# Patient Record
Sex: MALE | Race: WHITE | NOT HISPANIC OR LATINO | Employment: OTHER | URBAN - METROPOLITAN AREA
[De-identification: names, ages, dates, MRNs, and addresses within clinical notes are randomized per-mention and may not be internally consistent; named-entity substitution may affect disease eponyms.]

---

## 2017-01-30 ENCOUNTER — HOSPITAL ENCOUNTER (EMERGENCY)
Facility: HOSPITAL | Age: 42
Discharge: HOME | End: 2017-01-30
Attending: EMERGENCY MEDICINE | Admitting: EMERGENCY MEDICINE

## 2017-01-30 ENCOUNTER — APPOINTMENT (EMERGENCY)
Dept: RADIOLOGY | Facility: HOSPITAL | Age: 42
End: 2017-01-30

## 2017-01-30 VITALS
OXYGEN SATURATION: 95 % | HEIGHT: 66 IN | BODY MASS INDEX: 32.14 KG/M2 | RESPIRATION RATE: 18 BRPM | SYSTOLIC BLOOD PRESSURE: 153 MMHG | DIASTOLIC BLOOD PRESSURE: 93 MMHG | HEART RATE: 77 BPM | WEIGHT: 200 LBS | TEMPERATURE: 98.4 F

## 2017-01-30 DIAGNOSIS — S61.511A WRIST LACERATION, RIGHT, INITIAL ENCOUNTER: Primary | ICD-10-CM

## 2017-01-30 PROCEDURE — 90471 IMMUNIZATION ADMIN: CPT

## 2017-01-30 PROCEDURE — 90715 TDAP VACCINE 7 YRS/> IM: CPT | Performed by: EMERGENCY MEDICINE

## 2017-01-30 PROCEDURE — 99282 EMERGENCY DEPT VISIT SF MDM: CPT

## 2017-01-30 RX ORDER — LIDOCAINE HYDROCHLORIDE 10 MG/ML
10 INJECTION, SOLUTION EPIDURAL; INFILTRATION; INTRACAUDAL; PERINEURAL ONCE
Status: COMPLETED | OUTPATIENT
Start: 2017-01-30 | End: 2017-01-30

## 2017-01-30 RX ADMIN — TETANUS TOXOID, REDUCED DIPHTHERIA TOXOID AND ACELLULAR PERTUSSIS VACCINE, ADSORBED 0.5 ML: 5; 2.5; 8; 8; 2.5 SUSPENSION INTRAMUSCULAR at 20:07

## 2017-01-30 RX ADMIN — LIDOCAINE HYDROCHLORIDE 10 ML: 10 INJECTION, SOLUTION EPIDURAL; INFILTRATION; INTRACAUDAL; PERINEURAL at 20:24

## 2018-09-06 ENCOUNTER — APPOINTMENT (EMERGENCY)
Dept: CT IMAGING | Facility: HOSPITAL | Age: 43
End: 2018-09-06

## 2018-09-06 ENCOUNTER — HOSPITAL ENCOUNTER (EMERGENCY)
Facility: HOSPITAL | Age: 43
Discharge: HOME | End: 2018-09-06
Attending: EMERGENCY MEDICINE | Admitting: EMERGENCY MEDICINE

## 2018-09-06 VITALS
TEMPERATURE: 98.2 F | DIASTOLIC BLOOD PRESSURE: 82 MMHG | SYSTOLIC BLOOD PRESSURE: 146 MMHG | HEART RATE: 83 BPM | OXYGEN SATURATION: 97 % | RESPIRATION RATE: 16 BRPM

## 2018-09-06 DIAGNOSIS — K57.92 ACUTE DIVERTICULITIS: Primary | ICD-10-CM

## 2018-09-06 LAB
ALBUMIN SERPL BCP-MCNC: 3.7 G/DL (ref 3.5–5)
ALP SERPL-CCNC: 98 U/L (ref 46–116)
ALT SERPL W P-5'-P-CCNC: 43 U/L (ref 12–78)
ANION GAP SERPL CALCULATED.3IONS-SCNC: 9 MMOL/L (ref 4–13)
AST SERPL W P-5'-P-CCNC: 21 U/L (ref 5–45)
BASOPHILS # BLD AUTO: 0.08 THOUSANDS/ΜL (ref 0–0.1)
BASOPHILS NFR BLD AUTO: 1 % (ref 0–1)
BILIRUB SERPL-MCNC: 0.35 MG/DL (ref 0.2–1)
BILIRUB UR QL STRIP: NEGATIVE
BUN SERPL-MCNC: 11 MG/DL (ref 5–25)
CALCIUM SERPL-MCNC: 9 MG/DL (ref 8.3–10.1)
CHLORIDE SERPL-SCNC: 103 MMOL/L (ref 100–108)
CLARITY UR: CLEAR
CO2 SERPL-SCNC: 26 MMOL/L (ref 21–32)
COLOR UR: YELLOW
COLOR, POC: YELLOW
CREAT SERPL-MCNC: 1.03 MG/DL (ref 0.6–1.3)
EOSINOPHIL # BLD AUTO: 0.22 THOUSAND/ΜL (ref 0–0.61)
EOSINOPHIL NFR BLD AUTO: 2 % (ref 0–6)
ERYTHROCYTE [DISTWIDTH] IN BLOOD BY AUTOMATED COUNT: 13.5 % (ref 11.6–15.1)
GFR SERPL CREATININE-BSD FRML MDRD: 90 ML/MIN/1.73SQ M
GLUCOSE SERPL-MCNC: 86 MG/DL (ref 65–140)
GLUCOSE UR STRIP-MCNC: NEGATIVE MG/DL
HCT VFR BLD AUTO: 45.1 % (ref 36.5–49.3)
HGB BLD-MCNC: 15.3 G/DL (ref 12–17)
HGB UR QL STRIP.AUTO: NEGATIVE
IMM GRANULOCYTES # BLD AUTO: 0.03 THOUSAND/UL (ref 0–0.2)
IMM GRANULOCYTES NFR BLD AUTO: 0 % (ref 0–2)
KETONES UR STRIP-MCNC: NEGATIVE MG/DL
LEUKOCYTE ESTERASE UR QL STRIP: NEGATIVE
LYMPHOCYTES # BLD AUTO: 2.73 THOUSANDS/ΜL (ref 0.6–4.47)
LYMPHOCYTES NFR BLD AUTO: 26 % (ref 14–44)
MCH RBC QN AUTO: 30.4 PG (ref 26.8–34.3)
MCHC RBC AUTO-ENTMCNC: 33.9 G/DL (ref 31.4–37.4)
MCV RBC AUTO: 90 FL (ref 82–98)
MONOCYTES # BLD AUTO: 0.72 THOUSAND/ΜL (ref 0.17–1.22)
MONOCYTES NFR BLD AUTO: 7 % (ref 4–12)
NEUTROPHILS # BLD AUTO: 6.88 THOUSANDS/ΜL (ref 1.85–7.62)
NEUTS SEG NFR BLD AUTO: 64 % (ref 43–75)
NITRITE UR QL STRIP: NEGATIVE
NRBC BLD AUTO-RTO: 0 /100 WBCS
PH UR STRIP.AUTO: 7.5 [PH] (ref 4.5–8)
PLATELET # BLD AUTO: 294 THOUSANDS/UL (ref 149–390)
PMV BLD AUTO: 9.6 FL (ref 8.9–12.7)
POTASSIUM SERPL-SCNC: 3.8 MMOL/L (ref 3.5–5.3)
PROT SERPL-MCNC: 7.8 G/DL (ref 6.4–8.2)
PROT UR STRIP-MCNC: NEGATIVE MG/DL
RBC # BLD AUTO: 5.03 MILLION/UL (ref 3.88–5.62)
SODIUM SERPL-SCNC: 138 MMOL/L (ref 136–145)
SP GR UR STRIP.AUTO: 1.01 (ref 1–1.03)
UROBILINOGEN UR QL STRIP.AUTO: 0.2 E.U./DL
WBC # BLD AUTO: 10.66 THOUSAND/UL (ref 4.31–10.16)

## 2018-09-06 PROCEDURE — 74177 CT ABD & PELVIS W/CONTRAST: CPT

## 2018-09-06 PROCEDURE — 36415 COLL VENOUS BLD VENIPUNCTURE: CPT | Performed by: EMERGENCY MEDICINE

## 2018-09-06 PROCEDURE — 80053 COMPREHEN METABOLIC PANEL: CPT | Performed by: EMERGENCY MEDICINE

## 2018-09-06 PROCEDURE — 85025 COMPLETE CBC W/AUTO DIFF WBC: CPT | Performed by: EMERGENCY MEDICINE

## 2018-09-06 PROCEDURE — 81003 URINALYSIS AUTO W/O SCOPE: CPT

## 2018-09-06 PROCEDURE — 99284 EMERGENCY DEPT VISIT MOD MDM: CPT

## 2018-09-06 RX ORDER — CIPROFLOXACIN 500 MG/1
500 TABLET, FILM COATED ORAL ONCE
Status: COMPLETED | OUTPATIENT
Start: 2018-09-06 | End: 2018-09-06

## 2018-09-06 RX ORDER — CIPROFLOXACIN 500 MG/1
500 TABLET, FILM COATED ORAL EVERY 12 HOURS
Qty: 20 TABLET | Refills: 0 | Status: SHIPPED | OUTPATIENT
Start: 2018-09-06 | End: 2018-09-16

## 2018-09-06 RX ORDER — METRONIDAZOLE 500 MG/1
500 TABLET ORAL ONCE
Status: COMPLETED | OUTPATIENT
Start: 2018-09-06 | End: 2018-09-06

## 2018-09-06 RX ORDER — METRONIDAZOLE 500 MG/1
500 TABLET ORAL EVERY 8 HOURS SCHEDULED
Qty: 30 TABLET | Refills: 0 | Status: SHIPPED | OUTPATIENT
Start: 2018-09-06 | End: 2018-09-16

## 2018-09-06 RX ADMIN — CIPROFLOXACIN HYDROCHLORIDE 500 MG: 500 TABLET, FILM COATED ORAL at 14:29

## 2018-09-06 RX ADMIN — SODIUM CHLORIDE 1000 ML: 0.9 INJECTION, SOLUTION INTRAVENOUS at 12:48

## 2018-09-06 RX ADMIN — METRONIDAZOLE 500 MG: 500 TABLET ORAL at 14:29

## 2018-09-06 RX ADMIN — IOHEXOL 100 ML: 350 INJECTION, SOLUTION INTRAVENOUS at 13:58

## 2018-09-06 NOTE — DISCHARGE INSTRUCTIONS
You had labs drawn which were all normal  The labs were complete blood count and comprehensive metabolic panel  Diverticulitis   WHAT YOU NEED TO KNOW:   Diverticulitis is a condition that causes small pockets along your intestine called diverticula to become inflamed or infected  This is caused by hard bowel movements, food, or bacteria that get stuck in the pockets  DISCHARGE INSTRUCTIONS:   Return to the emergency department if:   · You have bowel movement or foul-smelling discharge leaking from your vagina or in your urine  · You have severe diarrhea  · You urinate less than usual or not at all  · You are not able to have a bowel movement  · You cannot stop vomiting  · You have severe abdominal pain, a fever, and your abdomen is larger than usual      · You have new or increased blood in your bowel movements  Contact your healthcare provider if:   · You have pain when you urinate  · Your symptoms get worse or do not go away  · You have questions or concerns about your condition or care  Medicines:   · Antibiotics  may be given to help treat a bacterial infection  · Prescription pain medicine  may be given  Ask your healthcare provider how to take this medicine safely  Some prescription pain medicines contain acetaminophen  Do not take other medicines that contain acetaminophen without talking to your healthcare provider  Too much acetaminophen may cause liver damage  Prescription pain medicine may cause constipation  Ask your healthcare provider how to prevent or treat constipation  · Take your medicine as directed  Contact your healthcare provider if you think your medicine is not helping or if you have side effects  Tell him or her if you are allergic to any medicine  Keep a list of the medicines, vitamins, and herbs you take  Include the amounts, and when and why you take them  Bring the list or the pill bottles to follow-up visits   Carry your medicine list with you in case of an emergency  Clear liquid diet:  A clear liquid diet includes any liquids that you can see through  Examples include water, ginger-itz, cranberry or apple juice, frozen fruit ice, or broth  Stay on a clear liquid diet until your symptoms are gone, or as directed  Follow up with your healthcare provider as directed: You may need to return for a colonoscopy  When your symptoms are gone, you may need a low-fat, high-fiber diet to prevent diverticulitis from developing again  Your healthcare provider or dietitian can help you create meal plans  Write down your questions so you remember to ask them during your visits  © 2017 [employer]  Information is for End User's use only and may not be sold, redistributed or otherwise used for commercial purposes  All illustrations and images included in CareNotes® are the copyrighted property of [employer]  or [employer]  The above information is an  only  It is not intended as medical advice for individual conditions or treatments  Talk to your doctor, nurse or pharmacist before following any medical regimen to see if it is safe and effective for you  Diverticulitis Diet   WHAT YOU NEED TO KNOW:   A diverticulitis diet includes foods that allow your intestines to rest while you have diverticulitis  Diverticulitis is a condition that causes small pockets along your intestine called diverticula to become inflamed or infected  This is caused by hard bowel movement, food, or bacteria that get stuck in the pockets  DISCHARGE INSTRUCTIONS:   Foods you can eat while you have diverticulitis:   · A clear liquid diet may be recommended for 2 to 3 days  A clear liquid diet is made up of clear liquids and foods that are liquid at room temperature  Your healthcare provider will tell you when you can start eating solid foods   Examples of clear liquids include the following:     ¨ Water and clear juices (such as apple, cranberry, or grape), strained citrus juices or fruit punch    ¨ Coffee or tea (without cream or milk)    ¨ Clear sports drinks or soft drinks, such as ginger ale, lemon-lime soda, or club soda (no cola or root beer)    ¨ Clear broth, bouillon, or consommé    ¨ Plain popsicles (no popsicles with pureed fruit or fiber)    ¨ Flavored gelatin without fruit    · A low-fiber diet may be recommended until your symptoms improve  Your healthcare provider will tell you when you can slowly add high-fiber foods back into your diet  ¨ Cream of wheat and finely ground grits    ¨ White bread, white pasta, and white rice    ¨ Canned and well-cooked fruit without skins or seeds, and juice without pulp    ¨ Canned and well-cooked vegetables without skins or seeds, and vegetable juice    ¨ Cow's milk, lactose-free milk, soy milk, and rice milk    ¨ Yogurt, cottage cheese, and sherbet    ¨ Eggs, poultry (such as chicken and turkey), fish, and tender, ground, well-cooked beef     ¨ Tofu and smooth nut butters, such as peanut butter    ¨ Broth and strained soups made of low-fiber foods  Foods you should avoid while you have diverticulitis:  Avoid foods that are high in fiber while you have symptoms of diverticulitis  Examples of high-fiber foods include the following:  · Whole grains and breads, and cereals made with whole grains    · Dried fruit, fresh fruit with skin, and fruit pulp    · Raw vegetables    · Cooked greens, such as spinach    · Tough meat and meat with gristle    · Legumes, such as higgins beans and lentils  Contact your healthcare provider if:   · Your symptoms do not get better, or they get worse  · You have questions about the foods you should eat  · You have questions or concerns about your condition or care  © 2017 [employer] Information is for End User's use only and may not be sold, redistributed or otherwise used for commercial purposes   All illustrations and images included in CareNotes® are the copyrighted property of [employer] M [employer]  or [employer]  The above information is an  only  It is not intended as medical advice for individual conditions or treatments  Talk to your doctor, nurse or pharmacist before following any medical regimen to see if it is safe and effective for you

## 2018-09-06 NOTE — ED PROVIDER NOTES
History  Chief Complaint   Patient presents with    Abdominal Pain     LLQ pain since yesterday, states pain is constant  57-year-old male left lower quadrant pain aching  Three days ago started  No diarrhea constipation perhaps some loose stools in the past week  No fevers or chills no dysuria hematuria  Exam reveals left lower quadrant pain    Assessment plan left lower outer quadrant pain likely acute diverticulitis no history so will confirm a CT check labs urine DC possible depending on labs, ct  Abdominal Pain   Pain location:  LLQ  Pain quality: aching, cramping and gnawing    Pain radiates to:  Does not radiate  Pain severity:  Moderate  Onset quality:  Gradual  Timing:  Intermittent  Progression:  Waxing and waning  Chronicity:  New  Relieved by:  Nothing  Worsened by:  Nothing  Ineffective treatments:  None tried  Associated symptoms: no chest pain, no chills, no cough, no diarrhea, no dysuria, no fatigue, no fever, no nausea, no shortness of breath and no vomiting        None       History reviewed  No pertinent past medical history  History reviewed  No pertinent surgical history  History reviewed  No pertinent family history  I have reviewed and agree with the history as documented  Social History   Substance Use Topics    Smoking status: Heavy Tobacco Smoker     Packs/day: 2 00     Types: Cigarettes    Smokeless tobacco: Current User     Types: Chew    Alcohol use Yes      Comment: occas        Review of Systems   Constitutional: Negative for chills, fatigue and fever  Eyes: Negative for photophobia and visual disturbance  Respiratory: Negative for cough and shortness of breath  Cardiovascular: Negative for chest pain, palpitations and leg swelling  Gastrointestinal: Positive for abdominal pain  Negative for diarrhea, nausea and vomiting  Endocrine: Negative for polydipsia and polyuria     Genitourinary: Negative for decreased urine volume, difficulty urinating, dysuria and frequency  Musculoskeletal: Negative for back pain, neck pain and neck stiffness  Skin: Negative for color change and rash  Allergic/Immunologic: Negative for environmental allergies and immunocompromised state  Neurological: Negative for dizziness and headaches  Hematological: Negative for adenopathy  Does not bruise/bleed easily  Psychiatric/Behavioral: Negative for dysphoric mood  The patient is not nervous/anxious  Physical Exam  Physical Exam   Constitutional: He is oriented to person, place, and time  He appears well-developed  HENT:   Head: Normocephalic and atraumatic  Right Ear: External ear normal    Left Ear: External ear normal    Mouth/Throat: Oropharynx is clear and moist    Eyes: Conjunctivae and EOM are normal  Pupils are equal, round, and reactive to light  Neck: Normal range of motion  Neck supple  No JVD present  No thyromegaly present  Cardiovascular: Normal rate, regular rhythm and normal heart sounds  Exam reveals no gallop and no friction rub  No murmur heard  Pulmonary/Chest: Effort normal and breath sounds normal  No respiratory distress  He has no wheezes  He has no rales  Abdominal: Soft  Bowel sounds are normal  He exhibits no distension  There is no rebound and no guarding  Musculoskeletal: Normal range of motion  He exhibits no edema  Lymphadenopathy:     He has no cervical adenopathy  Neurological: He is alert and oriented to person, place, and time  No cranial nerve deficit  Skin: Skin is warm  Psychiatric: He has a normal mood and affect  His behavior is normal    Nursing note and vitals reviewed        Vital Signs  ED Triage Vitals [09/06/18 1220]   Temperature Pulse Respirations Blood Pressure SpO2   98 2 °F (36 8 °C) 87 18 160/94 96 %      Temp Source Heart Rate Source Patient Position - Orthostatic VS BP Location FiO2 (%)   Temporal Monitor Sitting Right arm --      Pain Score       7           Vitals:    09/06/18 1220 09/06/18 1431   BP: 160/94 146/82   Pulse: 87 83   Patient Position - Orthostatic VS: Sitting Sitting       Visual Acuity      ED Medications  Medications   sodium chloride 0 9 % bolus 1,000 mL (0 mL Intravenous Stopped 9/6/18 1429)   iohexol (OMNIPAQUE) 350 MG/ML injection (MULTI-DOSE) 100 mL (100 mL Intravenous Given 9/6/18 1358)   ciprofloxacin (CIPRO) tablet 500 mg (500 mg Oral Given 9/6/18 1429)   metroNIDAZOLE (FLAGYL) tablet 500 mg (500 mg Oral Given 9/6/18 1429)       Diagnostic Studies  Results Reviewed     Procedure Component Value Units Date/Time    POCT urinalysis dipstick [[id number]]  (Normal) Resulted:  09/06/18 1255    Lab Status:  Final result Specimen:  Urine Updated:  09/06/18 1333     Color, UA yellow    Comprehensive metabolic panel [[id number]] Collected:  09/06/18 1248    Lab Status:  Final result Specimen:  Blood from Arm, Right Updated:  09/06/18 1314     Sodium 138 mmol/L      Potassium 3 8 mmol/L      Chloride 103 mmol/L      CO2 26 mmol/L      ANION GAP 9 mmol/L      BUN 11 mg/dL      Creatinine 1 03 mg/dL      Glucose 86 mg/dL      Calcium 9 0 mg/dL      AST 21 U/L      ALT 43 U/L      Alkaline Phosphatase 98 U/L      Total Protein 7 8 g/dL      Albumin 3 7 g/dL      Total Bilirubin 0 35 mg/dL      eGFR 90 ml/min/1 73sq m     Narrative:         National Kidney Disease Education Program recommendations are as follows:  GFR calculation is accurate only with a steady state creatinine  Chronic Kidney disease less than 60 ml/min/1 73 sq  meters  Kidney failure less than 15 ml/min/1 73 sq  meters      CBC and differential [[id number]]  (Abnormal) Collected:  09/06/18 1248    Lab Status:  Final result Specimen:  Blood from Arm, Right Updated:  09/06/18 1257     WBC 10 66 (H) Thousand/uL      RBC 5 03 Million/uL      Hemoglobin 15 3 g/dL      Hematocrit 45 1 %      MCV 90 fL      MCH 30 4 pg      MCHC 33 9 g/dL      RDW 13 5 %      MPV 9 6 fL      Platelets 670 Thousands/uL      nRBC 0 /100 WBCs Neutrophils Relative 64 %      Immat GRANS % 0 %      Lymphocytes Relative 26 %      Monocytes Relative 7 %      Eosinophils Relative 2 %      Basophils Relative 1 %      Neutrophils Absolute 6 88 Thousands/µL      Immature Grans Absolute 0 03 Thousand/uL      Lymphocytes Absolute 2 73 Thousands/µL      Monocytes Absolute 0 72 Thousand/µL      Eosinophils Absolute 0 22 Thousand/µL      Basophils Absolute 0 08 Thousands/µL     ED Urine Macroscopic [[id number]] Collected:  09/06/18 1303    Lab Status:  Final result Specimen:  Urine Updated:  09/06/18 1254     Color, UA Yellow     Clarity, UA Clear     pH, UA 7 5     Leukocytes, UA Negative     Nitrite, UA Negative     Protein, UA Negative mg/dl      Glucose, UA Negative mg/dl      Ketones, UA Negative mg/dl      Urobilinogen, UA 0 2 E U /dl      Bilirubin, UA Negative     Blood, UA Negative     Specific Gravity, UA 1 015    Narrative:       CL[facility] RESULT                 CT abdomen pelvis w contrast   Final Result by Joan Carney MD (09/06 1403)      Mild acute descending colonic diverticulitis  No free air or pericolonic abscess  The study was marked in EPIC for significant notification              Workstation performed: [id number]                    Procedures  Procedures       Phone Contacts  ED Phone Contact    ED Course                               MDM  Number of Diagnoses or Management Options  Acute diverticulitis: new and requires workup  Diagnosis management comments: Patient with mild acute descending colon diverticulitis will be treated with antibiotics given follow-up with Colorectal and also the patient has a family doctor now that he will follow up with       Amount and/or Complexity of Data Reviewed  Clinical lab tests: reviewed and ordered  Tests in the radiology section of CPT®: reviewed and ordered  Tests in the medicine section of CPT®: reviewed and ordered  Independent visualization of images, tracings, or specimens: yes    Patient Progress  Patient progress: stable    CritCare Time    Disposition  Final diagnoses:   Acute diverticulitis     Time reflects when diagnosis was documented in both MDM as applicable and the Disposition within this note     Time User Action Codes Description Comment    9/6/2018  2:19 PM Alexis Antoni Add [K57 92] Acute diverticulitis       ED Disposition     ED Disposition Condition Comment    Discharge  Angel Raman discharge to home/self care  Condition at discharge: Good        Follow-up Information     Follow up With Specialties Details Why Contact Info Additional [address] [facility] Emergency Department Emergency Medicine  If symptoms worsen [address] [facility]  [phone] [place] [facility], [address] , Viral Espinoza [address], MD Colon and Rectal Surgery   [address] [place] [address]  [phone]             Discharge Medication List as of 9/6/2018  2:24 PM      START taking these medications    Details   ciprofloxacin (CIPRO) 500 mg tablet Take 1 tablet (500 mg total) by mouth every 12 (twelve) hours for 10 days, Starting u 9/6/2018, Until Sun 9/16/2018, Print      metroNIDAZOLE (FLAGYL) 500 mg tablet Take 1 tablet (500 mg total) by mouth every 8 (eight) hours for 10 days, Starting Thu 9/6/2018, Until Sun 9/16/2018, Print           No discharge procedures on file      ED Provider  Electronically Signed by           Valentino Do DO  09/11/18 9305

## 2018-12-05 ENCOUNTER — OFFICE VISIT (OUTPATIENT)
Dept: FAMILY MEDICINE CLINIC | Facility: CLINIC | Age: 43
End: 2018-12-05
Payer: COMMERCIAL

## 2018-12-05 VITALS
WEIGHT: 213.6 LBS | RESPIRATION RATE: 18 BRPM | HEART RATE: 68 BPM | BODY MASS INDEX: 34.33 KG/M2 | DIASTOLIC BLOOD PRESSURE: 76 MMHG | SYSTOLIC BLOOD PRESSURE: 124 MMHG | HEIGHT: 66 IN | OXYGEN SATURATION: 96 % | TEMPERATURE: 97.9 F

## 2018-12-05 DIAGNOSIS — M77.12 LATERAL EPICONDYLITIS OF BOTH ELBOWS: ICD-10-CM

## 2018-12-05 DIAGNOSIS — K57.92 DIVERTICULITIS: ICD-10-CM

## 2018-12-05 DIAGNOSIS — Z13.6 ENCOUNTER FOR LIPID SCREENING FOR CARDIOVASCULAR DISEASE: ICD-10-CM

## 2018-12-05 DIAGNOSIS — L40.9 PSORIASIS: ICD-10-CM

## 2018-12-05 DIAGNOSIS — R73.03 PREDIABETES: ICD-10-CM

## 2018-12-05 DIAGNOSIS — Z13.220 ENCOUNTER FOR LIPID SCREENING FOR CARDIOVASCULAR DISEASE: ICD-10-CM

## 2018-12-05 DIAGNOSIS — M77.11 LATERAL EPICONDYLITIS OF BOTH ELBOWS: ICD-10-CM

## 2018-12-05 DIAGNOSIS — F17.200 SMOKER: ICD-10-CM

## 2018-12-05 DIAGNOSIS — I10 ESSENTIAL HYPERTENSION: Primary | ICD-10-CM

## 2018-12-05 PROCEDURE — 3078F DIAST BP <80 MM HG: CPT | Performed by: FAMILY MEDICINE

## 2018-12-05 PROCEDURE — 3008F BODY MASS INDEX DOCD: CPT | Performed by: FAMILY MEDICINE

## 2018-12-05 PROCEDURE — 3725F SCREEN DEPRESSION PERFORMED: CPT | Performed by: FAMILY MEDICINE

## 2018-12-05 PROCEDURE — 99204 OFFICE O/P NEW MOD 45 MIN: CPT | Performed by: FAMILY MEDICINE

## 2018-12-05 PROCEDURE — 3074F SYST BP LT 130 MM HG: CPT | Performed by: FAMILY MEDICINE

## 2018-12-05 RX ORDER — LISINOPRIL 20 MG/1
20 TABLET ORAL DAILY
COMMUNITY
End: 2018-12-05 | Stop reason: SDUPTHER

## 2018-12-05 RX ORDER — MULTIVITAMIN
1 CAPSULE ORAL DAILY
COMMUNITY
End: 2020-12-16 | Stop reason: ALTCHOICE

## 2018-12-05 RX ORDER — MULTIVIT-MIN/IRON/FOLIC ACID/K 18-600-40
1 CAPSULE ORAL DAILY
COMMUNITY

## 2018-12-05 RX ORDER — LISINOPRIL 20 MG/1
20 TABLET ORAL DAILY
Qty: 30 TABLET | Refills: 0 | Status: SHIPPED | OUTPATIENT
Start: 2018-12-05 | End: 2018-12-26

## 2018-12-05 NOTE — PROGRESS NOTES
Subjective:      Patient ID: Elda Mccloud is a 41 y o  male  Patient is here to establish care  Patient states that she recently got insurance  He is to follow up with the primary care provider in [facility] but is unable to go there for his appointments anymore  Patient states that he was diagnosed to have hypertension and started on lisinopril  Patient states that he has been very careful with his diet and trying to lose weight  His blood pressure today is at goal   Patient underwent labs which revealed a state of pre diabetes with an A1c of 6 4, borderline cholesterol  Patient states that he is due for labs now  Also diagnosed with mild diverticulitis in September 2018  Patient was started on oral antibiotics and advised to follow up with the colorectal surgeon  Patient is reporting mild discomfort in his lower abdomen the denies any symptoms of fever nausea or vomiting  Patient states that his original symptoms have improved but he gets intermittent  sharp pain in his lower belly  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  He was diagnosed with lateral epicondylitis many years ago and treated with local steroid shot  Patient states that his symptoms improved significantly with the steroid injection  Hypertension   This is a chronic problem  The current episode started more than 1 year ago  The problem has been gradually improving since onset  The problem is controlled  Pertinent negatives include no blurred vision, chest pain, headaches, orthopnea, palpitations, peripheral edema or shortness of breath  Risk factors for coronary artery disease include dyslipidemia, male gender, obesity, diabetes mellitus, smoking/tobacco exposure and stress  Past treatments include lifestyle changes (LISINOPRIL 20 MG )  The current treatment provides significant improvement  There are no compliance problems  Abdominal Pain   This is a chronic problem  The current episode started more than 1 month ago  The onset quality is gradual  The problem occurs intermittently  The pain is located in the LLQ  The pain is at a severity of 4/10  The pain is mild  The quality of the pain is aching  The abdominal pain does not radiate  Pertinent negatives include no constipation, diarrhea, dysuria, fever, headaches, hematuria, melena, myalgias, nausea or vomiting  Nothing aggravates the pain  The pain is relieved by nothing  He has tried nothing for the symptoms  Prior diagnostic workup includes CT scan  Diagnosed with mild diverticulitis in September 2018  Arm Pain    Incident onset: Chronic  The incident occurred at home  There was no injury mechanism  The pain is present in the left elbow and right elbow  The quality of the pain is described as aching  The pain does not radiate  The pain is at a severity of 6/10  The pain is moderate  The pain has been fluctuating since the incident  Pertinent negatives include no chest pain  The symptoms are aggravated by lifting and movement  He has tried nothing for the symptoms  The treatment provided no relief  Past Medical History:   Diagnosis Date    Diverticulitis     Hypertension     Prediabetes        Family History   Problem Relation Age of Onset    Diabetes Maternal Grandmother     Diabetes Paternal Grandfather        History reviewed  No pertinent surgical history  reports that he has been smoking Cigarettes  He has a 50 00 pack-year smoking history  His smokeless tobacco use includes Chew  He reports that he drinks alcohol  He reports that he does not use drugs        Current Outpatient Prescriptions:     Cholecalciferol (VITAMIN D) 2000 units CAPS, Take 1 capsule by mouth daily, Disp: , Rfl:     lisinopril (ZESTRIL) 20 mg tablet, Take 1 tablet (20 mg total) by mouth daily, Disp: 30 tablet, Rfl: 0    Multiple Vitamin (MULTIVITAMIN) capsule, Take 1 capsule by mouth daily, Disp: , Rfl:     Ustekinumab (STELARA SC), Inject under the skin, Disp: , Rfl:     The following portions of the patient's history were reviewed and updated as appropriate: allergies, current medications, past family history, past medical history, past social history, past surgical history and problem list     Review of Systems   Constitutional: Negative  Negative for fever  Eyes: Negative for blurred vision  Respiratory: Negative  Negative for shortness of breath  Cardiovascular: Negative  Negative for chest pain, palpitations and orthopnea  Gastrointestinal: Positive for abdominal pain  Negative for constipation, diarrhea, melena, nausea and vomiting  Genitourinary: Negative for dysuria and hematuria  Musculoskeletal: Negative  Negative for myalgias  Neurological: Negative  Negative for headaches  Psychiatric/Behavioral: Negative  Objective:    /76   Pulse 68   Temp 97 9 °F (36 6 °C)   Resp 18   Ht 5' 6" (1 676 m)   Wt 96 9 kg (213 lb 9 6 oz)   SpO2 96%   BMI 34 48 kg/m²      Physical Exam   Constitutional: He is oriented to person, place, and time  He appears well-developed and well-nourished  Cardiovascular: Normal rate, regular rhythm and normal heart sounds  Pulmonary/Chest: Effort normal and breath sounds normal    Abdominal: Soft  Bowel sounds are normal  He exhibits no distension and no mass  There is no tenderness  There is no guarding  Musculoskeletal: He exhibits tenderness (B/l lateral epicondyle  )  Neurological: He is alert and oriented to person, place, and time  Psychiatric: His behavior is normal  Judgment normal          No results found for this or any previous visit (from the past 1008 hour(s))  Assessment/Plan:  Patient's blood pressure is at goal   Continue current dose of lisinopril  Encouraged to go for labs to follow-up on his chronic medical issues  Referred to colorectal to follow-up on diverticulitis  Refer to  Ortho regarding bilateral lateral epicondylitis    3 weeks follow-up advised to review labs  Patient is currently smoking but wants to quit  Educate on the negative effects of Tobacco   Recommend quitting smoking  Listed cessation options,  Including smoking cessation program    Patient wants to  Make an effort on his own by reducing the number of cigarettes  If he is unable to do so then he will follow up again to discuss treatment options  I have spent 45 minutes with Patient  today in which greater than 50% of this time was spent in counseling/coordination of care regarding Diagnostic results, Prognosis, Risks and benefits of tx options, Intructions for management, Patient and family education, Importance of tx compliance, Risk factor reductions and Impressions  Diagnoses and all orders for this visit:    Essential hypertension  -     TSH, 3rd generation with Free T4 reflex  -     Microalbumin / creatinine urine ratio  -     lisinopril (ZESTRIL) 20 mg tablet; Take 1 tablet (20 mg total) by mouth daily    Prediabetes  -     Comprehensive metabolic panel  -     Hemoglobin A1C  -     TSH, 3rd generation with Free T4 reflex    Diverticulitis  -     CBC and differential  -     Ambulatory referral to Colorectal Surgery; Future    Smoker  -     Ambulatory referral to Smoking Cessation Program; Future    Encounter for lipid screening for cardiovascular disease  -     Lipid panel    Lateral epicondylitis of both elbows  -     Ambulatory referral to Orthopedic Surgery; Future    Psoriasis    Other orders  -     Discontinue: lisinopril (ZESTRIL) 20 mg tablet; Take 20 mg by mouth daily  -     Cholecalciferol (VITAMIN D) 2000 units CAPS; Take 1 capsule by mouth daily  -     Multiple Vitamin (MULTIVITAMIN) capsule; Take 1 capsule by mouth daily  -     Ustekinumab (STELARA SC);  Inject under the skin

## 2018-12-21 LAB
ALBUMIN SERPL-MCNC: 4.6 G/DL (ref 3.6–5.1)
ALBUMIN/CREAT UR: 4 MCG/MG CREAT
ALBUMIN/GLOB SERPL: 1.8 (CALC) (ref 1–2.5)
ALP SERPL-CCNC: 85 U/L (ref 40–115)
ALT SERPL-CCNC: 39 U/L (ref 9–46)
AST SERPL-CCNC: 23 U/L (ref 10–40)
BASOPHILS # BLD AUTO: 98 CELLS/UL (ref 0–200)
BASOPHILS NFR BLD AUTO: 1.1 %
BILIRUB SERPL-MCNC: 0.4 MG/DL (ref 0.2–1.2)
BUN SERPL-MCNC: 16 MG/DL (ref 7–25)
BUN/CREAT SERPL: ABNORMAL (CALC) (ref 6–22)
CALCIUM SERPL-MCNC: 9.5 MG/DL (ref 8.6–10.3)
CHLORIDE SERPL-SCNC: 104 MMOL/L (ref 98–110)
CHOLEST SERPL-MCNC: 230 MG/DL
CHOLEST/HDLC SERPL: 6.8 (CALC)
CO2 SERPL-SCNC: 27 MMOL/L (ref 20–32)
CREAT SERPL-MCNC: 0.86 MG/DL (ref 0.6–1.35)
CREAT UR-MCNC: 139 MG/DL (ref 20–320)
EOSINOPHIL # BLD AUTO: 294 CELLS/UL (ref 15–500)
EOSINOPHIL NFR BLD AUTO: 3.3 %
ERYTHROCYTE [DISTWIDTH] IN BLOOD BY AUTOMATED COUNT: 13.6 % (ref 11–15)
GLOBULIN SER CALC-MCNC: 2.6 G/DL (CALC) (ref 1.9–3.7)
GLUCOSE SERPL-MCNC: 109 MG/DL (ref 65–99)
HBA1C MFR BLD: 5.9 % OF TOTAL HGB
HCT VFR BLD AUTO: 41.8 % (ref 38.5–50)
HDLC SERPL-MCNC: 34 MG/DL
HGB BLD-MCNC: 14.6 G/DL (ref 13.2–17.1)
LDLC SERPL CALC-MCNC: 172 MG/DL (CALC)
LYMPHOCYTES # BLD AUTO: 3142 CELLS/UL (ref 850–3900)
LYMPHOCYTES NFR BLD AUTO: 35.3 %
MCH RBC QN AUTO: 30.2 PG (ref 27–33)
MCHC RBC AUTO-ENTMCNC: 34.9 G/DL (ref 32–36)
MCV RBC AUTO: 86.5 FL (ref 80–100)
MICROALBUMIN UR-MCNC: 0.6 MG/DL
MONOCYTES # BLD AUTO: 632 CELLS/UL (ref 200–950)
MONOCYTES NFR BLD AUTO: 7.1 %
NEUTROPHILS # BLD AUTO: 4735 CELLS/UL (ref 1500–7800)
NEUTROPHILS NFR BLD AUTO: 53.2 %
NONHDLC SERPL-MCNC: 196 MG/DL (CALC)
PLATELET # BLD AUTO: 279 THOUSAND/UL (ref 140–400)
PMV BLD REES-ECKER: 10.2 FL (ref 7.5–12.5)
POTASSIUM SERPL-SCNC: 4.3 MMOL/L (ref 3.5–5.3)
PROT SERPL-MCNC: 7.2 G/DL (ref 6.1–8.1)
RBC # BLD AUTO: 4.83 MILLION/UL (ref 4.2–5.8)
SL AMB EGFR AFRICAN AMERICAN: 125 ML/MIN/1.73M2
SL AMB EGFR NON AFRICAN AMERICAN: 108 ML/MIN/1.73M2
SODIUM SERPL-SCNC: 138 MMOL/L (ref 135–146)
TRIGL SERPL-MCNC: 111 MG/DL
TSH SERPL-ACNC: 1.08 MIU/L (ref 0.4–4.5)
WBC # BLD AUTO: 8.9 THOUSAND/UL (ref 3.8–10.8)

## 2018-12-26 ENCOUNTER — OFFICE VISIT (OUTPATIENT)
Dept: FAMILY MEDICINE CLINIC | Facility: CLINIC | Age: 43
End: 2018-12-26
Payer: COMMERCIAL

## 2018-12-26 VITALS
HEIGHT: 66 IN | HEART RATE: 80 BPM | RESPIRATION RATE: 16 BRPM | WEIGHT: 214 LBS | BODY MASS INDEX: 34.39 KG/M2 | DIASTOLIC BLOOD PRESSURE: 76 MMHG | OXYGEN SATURATION: 97 % | SYSTOLIC BLOOD PRESSURE: 140 MMHG

## 2018-12-26 DIAGNOSIS — F17.200 SMOKER: ICD-10-CM

## 2018-12-26 DIAGNOSIS — M77.12 LATERAL EPICONDYLITIS OF BOTH ELBOWS: ICD-10-CM

## 2018-12-26 DIAGNOSIS — I10 ESSENTIAL HYPERTENSION: ICD-10-CM

## 2018-12-26 DIAGNOSIS — M77.11 LATERAL EPICONDYLITIS OF BOTH ELBOWS: ICD-10-CM

## 2018-12-26 DIAGNOSIS — R73.03 PREDIABETES: Primary | ICD-10-CM

## 2018-12-26 DIAGNOSIS — E78.5 HYPERLIPIDEMIA, UNSPECIFIED HYPERLIPIDEMIA TYPE: ICD-10-CM

## 2018-12-26 PROCEDURE — 99214 OFFICE O/P EST MOD 30 MIN: CPT | Performed by: FAMILY MEDICINE

## 2018-12-26 RX ORDER — ROSUVASTATIN CALCIUM 5 MG/1
5 TABLET, COATED ORAL DAILY
Qty: 90 TABLET | Refills: 1 | Status: SHIPPED | OUTPATIENT
Start: 2018-12-26 | End: 2019-04-02 | Stop reason: SDUPTHER

## 2018-12-26 RX ORDER — LOSARTAN POTASSIUM 50 MG/1
50 TABLET ORAL DAILY
Qty: 90 TABLET | Refills: 0 | Status: SHIPPED | OUTPATIENT
Start: 2018-12-26 | End: 2019-04-02 | Stop reason: SDUPTHER

## 2018-12-26 RX ORDER — HYDROCORTISONE BUTYRATE 0.1 %
CREAM (GRAM) TOPICAL AS NEEDED
Refills: 3 | COMMUNITY
Start: 2018-12-22

## 2018-12-26 NOTE — PATIENT INSTRUCTIONS
Obesity   AMBULATORY CARE:   Obesity  is when your body mass index (BMI) is greater than 30  Your healthcare provider will use your height and weight to measure your BMI  The risks of obesity include  many health problems, such as injuries or physical disability  You may need tests to check for the following:  · Diabetes     · High blood pressure or high cholesterol     · Heart disease     · Gallbladder or liver disease     · Cancer of the colon, breast, prostate, liver, or kidney     · Sleep apnea     · Arthritis or gout  Seek care immediately if:   · You have a severe headache, confusion, or difficulty speaking  · You have weakness on one side of your body  · You have chest pain, sweating, or shortness of breath  Contact your healthcare provider if:   · You have symptoms of gallbladder or liver disease, such as pain in your upper abdomen  · You have knee or hip pain and discomfort while walking  · You have symptoms of diabetes, such as intense hunger and thirst, and frequent urination  · You have symptoms of sleep apnea, such as snoring or daytime sleepiness  · You have questions or concerns about your condition or care  Treatment for obesity  focuses on helping you lose weight to improve your health  Even a small decrease in BMI can reduce the risk for many health problems  Your healthcare provider will help you set a weight-loss goal   · Lifestyle changes  are the first step in treating obesity  These include making healthy food choices and getting regular physical activity  Your healthcare provider may suggest a weight-loss program that involves coaching, education, and therapy  · Medicine  may help you lose weight when it is used with a healthy diet and physical activity  · Surgery  can help you lose weight if you are very obese and have other health problems  There are several types of weight-loss surgery  Ask your healthcare provider for more information    Be successful losing weight:   · Set small, realistic goals  An example of a small goal is to walk for 20 minutes 5 days a week  Anthchey goal is to lose 5% of your body weight  · Tell friends, family members, and coworkers about your goals  and ask for their support  Ask a friend to lose weight with you, or join a weight-loss support group  · Identify foods or triggers that may cause you to overeat , and find ways to avoid them  Remove tempting high-calorie foods from your home and workplace  Place a bowl of fresh fruit on your kitchen counter  If stress causes you to eat, then find other ways to cope with stress  · Keep a diary to track what you eat and drink  Also write down how many minutes of physical activity you do each day  Weigh yourself once a week and record it in your diary  Eating changes: You will need to eat 500 to 1,000 fewer calories each day than you currently eat to lose 1 to 2 pounds a week  The following changes will help you cut calories:  · Eat smaller portions  Use small plates, no larger than 9 inches in diameter  Fill your plate half full of fruits and vegetables  Measure your food using measuring cups until you know what a serving size looks like  · Eat 3 meals and 1 or 2 snacks each day  Plan your meals in advance  Desiree Moe and eat at home most of the time  Eat slowly  · Eat fruits and vegetables at every meal   They are low in calories and high in fiber, which makes you feel full  Do not add butter, margarine, or cream sauce to vegetables  Use herbs to season steamed vegetables  · Eat less fat and fewer fried foods  Eat more baked or grilled chicken and fish  These protein sources are lower in calories and fat than red meat  Limit fast food  Dress your salads with olive oil and vinegar instead of bottled dressing  · Limit the amount of sugar you eat  Do not drink sugary beverages  Limit alcohol  Activity changes:  Physical activity is good for your body in many ways   It helps you burn calories and build strong muscles  It decreases stress and depression, and improves your mood  It can also help you sleep better  Talk to your healthcare provider before you begin an exercise program   · Exercise for at least 30 minutes 5 days a week  Start slowly  Set aside time each day for physical activity that you enjoy and that is convenient for you  It is best to do both weight training and an activity that increases your heart rate, such as walking, bicycling, or swimming  · Find ways to be more active  Do yard work and housecleaning  Walk up the stairs instead of using elevators  Spend your leisure time going to events that require walking, such as outdoor festivals or fairs  This extra physical activity can help you lose weight and keep it off  Follow up with your healthcare provider as directed: You may need to meet with a dietitian  Write down your questions so you remember to ask them during your visits  © 2017 [employer] Information is for End User's use only and may not be sold, redistributed or otherwise used for commercial purposes  All illustrations and images included in CareNotes® are the copyrighted property of A [employer]  or [employer]  The above information is an  only  It is not intended as medical advice for individual conditions or treatments  Talk to your doctor, nurse or pharmacist before following any medical regimen to see if it is safe and effective for you

## 2018-12-26 NOTE — PROGRESS NOTES
Subjective:      Patient ID: Chente Olguin is a 41 y o  male  Hypertension   This is a chronic problem  The current episode started more than 1 month ago  The problem is controlled  Pertinent negatives include no anxiety, chest pain, headaches, palpitations, peripheral edema or shortness of breath  There are no associated agents to hypertension  Risk factors for coronary artery disease include dyslipidemia, male gender and smoking/tobacco exposure  Treatments tried: lisinopril 20 mg daily  The current treatment provides significant improvement  Compliance problems include medication side effects (Dry cough)  Hyperlipidemia   This is a new problem  This is a new diagnosis  The problem is uncontrolled  Recent lipid tests were reviewed and are high  Factors aggravating his hyperlipidemia include smoking  Pertinent negatives include no chest pain, myalgias or shortness of breath  He is currently on no antihyperlipidemic treatment  Compliance problems include adherence to diet  Risk factors for coronary artery disease include dyslipidemia, hypertension, male sex and obesity (Prediabetes)  According to patient his A1c has reduced from 6 4 to 5 9 now  Patient has made significant changes to his diet  Continues to smoke at least a pack a day  Patient will start Chantix and is also going to call the [facility] smoking cessation program     Past Medical History:   Diagnosis Date    Diverticulitis     Hypertension     Prediabetes     Psoriasis        Family History   Problem Relation Age of Onset    Diabetes Maternal Grandmother     Diabetes Paternal Grandfather        History reviewed  No pertinent surgical history  reports that he has been smoking Cigarettes  He has a 50 00 pack-year smoking history  His smokeless tobacco use includes Chew  He reports that he drinks alcohol  He reports that he does not use drugs        Current Outpatient Prescriptions:     Cholecalciferol (VITAMIN D) 2000 units CAPS, Take 1 capsule by mouth daily, Disp: , Rfl:     Hydrocortisone Butyrate 0 1 % CREA, APPLY TO SKIN DAILY, Disp: , Rfl: 3    Multiple Vitamin (MULTIVITAMIN) capsule, Take 1 capsule by mouth daily, Disp: , Rfl:     Ustekinumab (STELARA SC), Inject under the skin, Disp: , Rfl:     losartan (COZAAR) 50 mg tablet, Take 1 tablet (50 mg total) by mouth daily, Disp: 90 tablet, Rfl: 0    rosuvastatin (CRESTOR) 5 mg tablet, Take 1 tablet (5 mg total) by mouth daily, Disp: 90 tablet, Rfl: 1    The following portions of the patient's history were reviewed and updated as appropriate: allergies, current medications, past family history, past medical history, past social history, past surgical history and problem list     Review of Systems   Constitutional: Negative  Respiratory: Negative  Negative for shortness of breath  Cardiovascular: Negative  Negative for chest pain and palpitations  Gastrointestinal: Negative  Musculoskeletal: Negative  Negative for myalgias  Neurological: Negative  Negative for headaches  Psychiatric/Behavioral: Negative  Objective:    /76   Pulse 80   Resp 16   Ht 5' 6" (1 676 m)   Wt 97 1 kg (214 lb)   SpO2 97%   BMI 34 54 kg/m²      Physical Exam   Constitutional: He is oriented to person, place, and time  He appears well-developed and well-nourished  Cardiovascular: Normal rate, regular rhythm and normal heart sounds  Pulmonary/Chest: Effort normal and breath sounds normal    Abdominal: Soft  Bowel sounds are normal    Neurological: He is alert and oriented to person, place, and time     Psychiatric: His behavior is normal  Judgment normal          Recent Results (from the past 1008 hour(s))   Lipid panel    Collection Time: 12/20/18  6:48 AM   Result Value Ref Range    Total Cholesterol 230 (H) <200 mg/dL    HDL 34 (L) >40 mg/dL    Triglycerides 111 <150 mg/dL    LDL Direct 172 (H) mg/dL (calc)    Chol HDLC Ratio 6 8 (H) <5 0 (calc)    Non-HDL Cholesterol 196 (H) <130 mg/dL (calc)   Comprehensive metabolic panel    Collection Time: 12/20/18  6:48 AM   Result Value Ref Range    Glucose, Random 109 (H) 65 - 99 mg/dL    BUN 16 7 - 25 mg/dL    Creatinine 0 86 0 60 - 1 35 mg/dL    eGFR Non  108 > OR = 60 mL/min/1 73m2    SL AMB EGFR  125 > OR = 60 mL/min/1 73m2    SL AMB BUN/CREATININE RATIO NOT APPLICABLE 6 - 22 (calc)    Sodium 138 135 - 146 mmol/L    Potassium 4 3 3 5 - 5 3 mmol/L    Chloride 104 98 - 110 mmol/L    CO2 27 20 - 32 mmol/L    SL AMB CALCIUM 9 5 8 6 - 10 3 mg/dL    SL AMB PROTEIN, TOTAL 7 2 6 1 - 8 1 g/dL    Albumin 4 6 3 6 - 5 1 g/dL    Globulin 2 6 1 9 - 3 7 g/dL (calc)    Albumin/Globulin Ratio 1 8 1 0 - 2 5 (calc)    TOTAL BILIRUBIN 0 4 0 2 - 1 2 mg/dL    Alkaline Phosphatase 85 40 - 115 U/L    SL AMB AST 23 10 - 40 U/L    SL AMB ALT 39 9 - 46 U/L   Microalbumin, Random Urine (W/Creatinine)    Collection Time: 12/20/18  6:48 AM   Result Value Ref Range    Creatinine, Urine 139 20 - 320 mg/dL    Microalbum  ,U,Random 0 6 See Note: mg/dL    Microalb/Creat Ratio 4 <30 mcg/mg creat    Comment(s)     CBC and differential    Collection Time: 12/20/18  6:48 AM   Result Value Ref Range    White Blood Cell Count 8 9 3 8 - 10 8 Thousand/uL    Red Blood Cell Count 4 83 4 20 - 5 80 Million/uL    Hemoglobin 14 6 13 2 - 17 1 g/dL    HCT 41 8 38 5 - 50 0 %    MCV 86 5 80 0 - 100 0 fL    MCH 30 2 27 0 - 33 0 pg    MCHC 34 9 32 0 - 36 0 g/dL    RDW 13 6 11 0 - 15 0 %    Platelet Count 210 231 - 400 Thousand/uL    SL AMB MPV 10 2 7 5 - 12 5 fL    Neutrophils (Absolute) 4,735 1,500 - 7,800 cells/uL    Lymphocytes (Absolute) 3,142 850 - 3,900 cells/uL    Monocytes (Absolute) 632 200 - 950 cells/uL    Eosinophils (Absolute) 294 15 - 500 cells/uL    Basophils ABS 98 0 - 200 cells/uL    Neutrophils 53 2 %    Lymphocytes 35 3 %    Monocytes 7 1 %    Eosinophils 3 3 %    Basophils PCT 1 1 %   TSH, 3rd generation with Free T4 reflex Collection Time: 12/20/18  6:48 AM   Result Value Ref Range    TSH W/RFX TO FREE T4 1 08 0 40 - 4 50 mIU/L   Hemoglobin A1c (w/out EAG)    Collection Time: 12/20/18  6:48 AM   Result Value Ref Range    Hemoglobin A1C 5 9 (H) <5 7 % of total Hgb       Assessment/Plan:    Patient switched over to losartan since he is experiencing dry cough with lisinopril  4 weeks follow-up for blood pressure check advised  Cholesterol is not at goal and he has multiple other risk factors  Cholesterol-lowering options discussed with patient  Patient states that he has already changed his diet so he does not know what else to do now  He is okay to start statin  Patient made aware of the common side effects of statins  Check CMP at 3 months and then 6 months interval   Continue healthy diet and exercise  Hopefully that will help improve his glycemic control further  Diagnoses and all orders for this visit:    Prediabetes  -     Comprehensive metabolic panel; Future  -     Hemoglobin A1C; Future    Essential hypertension  -     losartan (COZAAR) 50 mg tablet; Take 1 tablet (50 mg total) by mouth daily  -     Comprehensive metabolic panel; Future    Hyperlipidemia, unspecified hyperlipidemia type  -     rosuvastatin (CRESTOR) 5 mg tablet; Take 1 tablet (5 mg total) by mouth daily  -     Comprehensive metabolic panel; Future  -     Lipid panel; Future  -     Comprehensive metabolic panel; Future    Lateral epicondylitis of both elbows  -     Ambulatory referral to Orthopedic Surgery; Future    Smoker    BMI 34 0-34 9,adult    Other orders  -     Hydrocortisone Butyrate 0 1 % CREA; APPLY TO SKIN DAILY          BMI Counseling: Body mass index is 34 54 kg/m²  Discussed the patient's BMI with him  The BMI is above average  BMI counseling and education was provided to the patient   Nutrition recommendations include reducing portion sizes, decreasing overall calorie intake, 3-5 servings of fruits/vegetables daily, reducing fast food intake, consuming healthier snacks, decreasing soda and/or juice intake, moderation in carbohydrate intake, increasing intake of lean protein, reducing intake of saturated fat and trans fat and reducing intake of cholesterol  Exercise recommendations include moderate aerobic physical activity for 150 minutes/week

## 2019-01-07 ENCOUNTER — OFFICE VISIT (OUTPATIENT)
Dept: OBGYN CLINIC | Facility: MEDICAL CENTER | Age: 44
End: 2019-01-07
Payer: COMMERCIAL

## 2019-01-07 VITALS
DIASTOLIC BLOOD PRESSURE: 94 MMHG | BODY MASS INDEX: 34.2 KG/M2 | HEIGHT: 66 IN | HEART RATE: 83 BPM | WEIGHT: 212.8 LBS | SYSTOLIC BLOOD PRESSURE: 153 MMHG

## 2019-01-07 DIAGNOSIS — M77.12 LATERAL EPICONDYLITIS OF BOTH ELBOWS: Primary | ICD-10-CM

## 2019-01-07 DIAGNOSIS — M77.11 LATERAL EPICONDYLITIS OF BOTH ELBOWS: Primary | ICD-10-CM

## 2019-01-07 PROCEDURE — 99203 OFFICE O/P NEW LOW 30 MIN: CPT | Performed by: ORTHOPAEDIC SURGERY

## 2019-01-07 PROCEDURE — 20605 DRAIN/INJ JOINT/BURSA W/O US: CPT | Performed by: ORTHOPAEDIC SURGERY

## 2019-01-07 RX ORDER — LIDOCAINE HYDROCHLORIDE 10 MG/ML
1 INJECTION, SOLUTION INFILTRATION; PERINEURAL
Status: COMPLETED | OUTPATIENT
Start: 2019-01-07 | End: 2019-01-07

## 2019-01-07 RX ORDER — TRIAMCINOLONE ACETONIDE 40 MG/ML
40 INJECTION, SUSPENSION INTRA-ARTICULAR; INTRAMUSCULAR
Status: COMPLETED | OUTPATIENT
Start: 2019-01-07 | End: 2019-01-07

## 2019-01-07 RX ADMIN — TRIAMCINOLONE ACETONIDE 40 MG: 40 INJECTION, SUSPENSION INTRA-ARTICULAR; INTRAMUSCULAR at 08:42

## 2019-01-07 RX ADMIN — LIDOCAINE HYDROCHLORIDE 1 ML: 10 INJECTION, SOLUTION INFILTRATION; PERINEURAL at 08:42

## 2019-01-07 NOTE — PROGRESS NOTES
CHIEF COMPLAINT:  Chief Complaint   Patient presents with    Right Elbow - Pain    Left Elbow - Pain       SUBJECTIVE:  Wilma Parry is a 41y o  year old HD male who presents to the office for bilateral lateral elbow pain  Pt states that in June of 2018 he hit a piece of wood with a sledgehammer feeling a jarring vibration in both of his arms  Pt states that he has been having the bilateral elbow pain since this instance  Pt states that he was seen in and urgent care that prescribed oral steroids that did not help with his pain  Pt denies previous injury  Pt denies numbness and tingling  PAST MEDICAL HISTORY:  Past Medical History:   Diagnosis Date    Diverticulitis     Hypertension     Prediabetes     Psoriasis        PAST SURGICAL HISTORY:  History reviewed  No pertinent surgical history  FAMILY HISTORY:  Family History   Problem Relation Age of Onset    Diabetes Maternal Grandmother     Diabetes Paternal Grandfather        SOCIAL HISTORY:  Social History   Substance Use Topics    Smoking status: Heavy Tobacco Smoker     Packs/day: 2 00     Years: 25 00     Types: Cigarettes    Smokeless tobacco: Current User     Types: Chew    Alcohol use Yes      Comment: occas       MEDICATIONS:    Current Outpatient Prescriptions:     Cholecalciferol (VITAMIN D) 2000 units CAPS, Take 1 capsule by mouth daily, Disp: , Rfl:     Hydrocortisone Butyrate 0 1 % CREA, APPLY TO SKIN DAILY, Disp: , Rfl: 3    losartan (COZAAR) 50 mg tablet, Take 1 tablet (50 mg total) by mouth daily, Disp: 90 tablet, Rfl: 0    Multiple Vitamin (MULTIVITAMIN) capsule, Take 1 capsule by mouth daily, Disp: , Rfl:     rosuvastatin (CRESTOR) 5 mg tablet, Take 1 tablet (5 mg total) by mouth daily, Disp: 90 tablet, Rfl: 1    Ustekinumab (STELARA SC), Inject under the skin, Disp: , Rfl:     ALLERGIES:  No Known Allergies    REVIEW OF SYSTEMS:  Review of Systems   Constitutional: Negative for chills, fever and unexpected weight change  HENT: Negative for hearing loss, nosebleeds and sore throat  Eyes: Negative for pain, redness and visual disturbance  Respiratory: Negative for cough, shortness of breath and wheezing  Cardiovascular: Negative for chest pain, palpitations and leg swelling  Gastrointestinal: Negative for abdominal pain, nausea and vomiting  Endocrine: Negative for polydipsia and polyuria  Genitourinary: Negative for dysuria and hematuria  Musculoskeletal: Negative for arthralgias, joint swelling and myalgias  Skin: Negative for rash and wound  Neurological: Negative for light-headedness, numbness and headaches  Psychiatric/Behavioral: Negative for decreased concentration, dysphoric mood and suicidal ideas  The patient is not nervous/anxious  VITALS:  Vitals:    01/07/19 0804   BP: 153/94   Pulse: 83       LABS:  HgA1c:   Lab Results   Component Value Date    HGBA1C 5 9 (H) 12/20/2018     BMP:   Lab Results   Component Value Date    CALCIUM 9 5 12/20/2018    K 4 3 12/20/2018    CO2 27 12/20/2018     12/20/2018    BUN 16 12/20/2018    CREATININE 1 03 09/06/2018       _____________________________________________________  PHYSICAL EXAMINATION:  General: well developed and well nourished, alert, oriented times 3 and appears comfortable  Psychiatric: Normal  HEENT: Trachea Midline, No torticollis  Pulmonary: No audible wheezing or respiratory distress   Skin: No masses, erthema, lacerations, fluctation, ulcerations  Neurovascular: Sensation Intact to the Median, Ulnar, Radial Nerve, Motor Intact to the Median, Ulnar, Radial Nerve and Pulses Intact    MUSCULOSKELETAL EXAMINATION:  bilateral Elbow:    No swelling or deformity  Full range of motion with flexion, extension, supination and pronation  Positive tenderness to palpation over the Lateral Epicondyle  Pain with passive flexion of the wrist with elbow fully extended  Pain with resisted wrist extension with elbow fully extended    Pain with resisted forearm supination with the elbow fully extended  Negative tinels over the ulnar nerve at the elbow  ___________________________________________________  STUDIES REVIEWED:  No studies reviewed  PROCEDURES PERFORMED:  Medium joint arthrocentesis  Date/Time: 1/7/2019 8:42 AM  Consent given by: patient  Site marked: site marked  Timeout: Immediately prior to procedure a time out was called to verify the correct patient, procedure, equipment, support staff and site/side marked as required   Supporting Documentation  Indications: pain   Procedure Details  Location: elbow - R elbow  Ultrasound guidance: no  Medications administered: 1 mL lidocaine 1 %; 40 mg triamcinolone acetonide 40 mg/mL    Patient tolerance: patient tolerated the procedure well with no immediate complications  Dressing:  Sterile dressing applied  Medium joint arthrocentesis  Date/Time: 1/7/2019 8:42 AM  Consent given by: patient  Site marked: site marked  Timeout: Immediately prior to procedure a time out was called to verify the correct patient, procedure, equipment, support staff and site/side marked as required   Supporting Documentation  Indications: pain   Procedure Details  Location: elbow - L elbow  Ultrasound guidance: no  Medications administered: 1 mL lidocaine 1 %; 40 mg triamcinolone acetonide 40 mg/mL    Patient tolerance: patient tolerated the procedure well with no immediate complications  Dressing:  Sterile dressing applied           _____________________________________________________  ASSESSMENT/PLAN:    Bilateral Epicondylitis  * cortisone steroid injections were administered  Bilaterally today without complications  * patient was advised to apply heat and stretch as for office today  * patient will follow up in the office in 2 weeks      Follow Up:  Return in about 2 weeks (around 1/21/2019)        To Do Next Visit:  Re-evaluation of current issue    General Discussions:  Lateral Epicondylitis: The anatomy and physiology of lateral epicondylitis was discussed with the patient today  Typically, degenerative changes in the extensor carpi radialis brevis muscle occur over time  These degenerative changes appear as tears of the tendon on MRI  This creates pain over the lateral epicondyle (outside part of elbow)  This pain typically is made worse with palm down lifting activities as well as anything that involves strength and stability of the wrist   The pain may radiate from the wrist up to the elbow  At times, the shoulder may be weak as well which can predispose or cause continuation of the problem  Conservative treatment usually cures a majority of patients; however, this may take up to 6-9 months  Conservative treatment options typically include activity modification, therapy for strengthening of the shoulder and elbow, tennis elbow straps, and possible corticosteroid injections  Corticosteroid injections are very effective at relieving pain but do not alter the natural history of this process  Rather, steroid injections decrease the pain temporarily to allow for therapy to take place without discomfort  It was discussed that therapy to prevent recurrence is a "life long" process and that if patient relies on the steroid injection alone without performing therapeutic exercises the risk of recurrence is likely  Typical home regimen includes heat and stretching and resisted wrist extension exercises discussed in the office  Surgery is required in fewer than 10% of patients        Scribe Attestation    I,:   Raulito Horne am acting as a scribe while in the presence of the attending physician :        I,:   Susan Moe MD personally performed the services described in this documentation    as scribed in my presence :

## 2019-01-10 DIAGNOSIS — I10 ESSENTIAL HYPERTENSION: ICD-10-CM

## 2019-01-10 RX ORDER — LISINOPRIL 20 MG/1
TABLET ORAL
Qty: 30 TABLET | Refills: 0 | OUTPATIENT
Start: 2019-01-10

## 2019-01-25 ENCOUNTER — OFFICE VISIT (OUTPATIENT)
Dept: FAMILY MEDICINE CLINIC | Facility: CLINIC | Age: 44
End: 2019-01-25
Payer: COMMERCIAL

## 2019-01-25 VITALS
BODY MASS INDEX: 35.03 KG/M2 | HEART RATE: 64 BPM | HEIGHT: 66 IN | OXYGEN SATURATION: 97 % | WEIGHT: 218 LBS | DIASTOLIC BLOOD PRESSURE: 82 MMHG | SYSTOLIC BLOOD PRESSURE: 132 MMHG

## 2019-01-25 DIAGNOSIS — R73.03 PREDIABETES: Primary | ICD-10-CM

## 2019-01-25 DIAGNOSIS — E66.9 OBESITY (BMI 35.0-39.9 WITHOUT COMORBIDITY): ICD-10-CM

## 2019-01-25 DIAGNOSIS — I10 ESSENTIAL HYPERTENSION: ICD-10-CM

## 2019-01-25 PROCEDURE — 3075F SYST BP GE 130 - 139MM HG: CPT | Performed by: FAMILY MEDICINE

## 2019-01-25 PROCEDURE — 3079F DIAST BP 80-89 MM HG: CPT | Performed by: FAMILY MEDICINE

## 2019-01-25 PROCEDURE — 99214 OFFICE O/P EST MOD 30 MIN: CPT | Performed by: FAMILY MEDICINE

## 2019-01-25 NOTE — PROGRESS NOTES
Subjective:      Patient ID: Elda Mccloud is a 41 y o  male  Hypertension   This is a chronic problem  The current episode started more than 1 year ago  The problem is controlled  Pertinent negatives include no blurred vision, chest pain, headaches, orthopnea, palpitations, peripheral edema, PND or shortness of breath  Risk factors for coronary artery disease include dyslipidemia, male gender, obesity, smoking/tobacco exposure and sedentary lifestyle (Prediabetes)  Treatments tried: Losartan 50 mg daily  The current treatment provides significant improvement  Compliance problems include diet  Past Medical History:   Diagnosis Date    Diverticulitis     Hypertension     Prediabetes     Psoriasis        Family History   Problem Relation Age of Onset    Diabetes Maternal Grandmother     Diabetes Paternal Grandfather        History reviewed  No pertinent surgical history  reports that he has been smoking Cigarettes  He has a 50 00 pack-year smoking history  His smokeless tobacco use includes Chew  He reports that he drinks alcohol  He reports that he does not use drugs        Current Outpatient Prescriptions:     Cholecalciferol (VITAMIN D) 2000 units CAPS, Take 1 capsule by mouth daily, Disp: , Rfl:     Hydrocortisone Butyrate 0 1 % CREA, APPLY TO SKIN DAILY, Disp: , Rfl: 3    losartan (COZAAR) 50 mg tablet, Take 1 tablet (50 mg total) by mouth daily, Disp: 90 tablet, Rfl: 0    Multiple Vitamin (MULTIVITAMIN) capsule, Take 1 capsule by mouth daily, Disp: , Rfl:     rosuvastatin (CRESTOR) 5 mg tablet, Take 1 tablet (5 mg total) by mouth daily, Disp: 90 tablet, Rfl: 1    Ustekinumab (STELARA SC), Inject under the skin, Disp: , Rfl:     The following portions of the patient's history were reviewed and updated as appropriate: allergies, current medications, past family history, past medical history, past social history, past surgical history and problem list     Review of Systems   Constitutional: Negative  Eyes: Negative for blurred vision  Respiratory: Negative  Negative for shortness of breath  Cardiovascular: Negative  Negative for chest pain, palpitations, orthopnea and PND  Gastrointestinal: Negative  Musculoskeletal: Negative  Negative for myalgias  Neurological: Negative  Negative for headaches  Psychiatric/Behavioral: Negative  Objective:    /82   Pulse 64   Ht 5' 6" (1 676 m)   Wt 98 9 kg (218 lb)   SpO2 97%   BMI 35 19 kg/m²      Physical Exam   Constitutional: He is oriented to person, place, and time  He appears well-developed and well-nourished  Cardiovascular: Normal rate, regular rhythm and normal heart sounds  Pulmonary/Chest: Effort normal and breath sounds normal    Abdominal: Soft  Bowel sounds are normal    Neurological: He is alert and oriented to person, place, and time     Psychiatric: His behavior is normal  Judgment normal          Recent Results (from the past 1008 hour(s))   Lipid panel    Collection Time: 12/20/18  6:48 AM   Result Value Ref Range    Total Cholesterol 230 (H) <200 mg/dL    HDL 34 (L) >40 mg/dL    Triglycerides 111 <150 mg/dL    LDL Direct 172 (H) mg/dL (calc)    Chol HDLC Ratio 6 8 (H) <5 0 (calc)    Non-HDL Cholesterol 196 (H) <130 mg/dL (calc)   Comprehensive metabolic panel    Collection Time: 12/20/18  6:48 AM   Result Value Ref Range    Glucose, Random 109 (H) 65 - 99 mg/dL    BUN 16 7 - 25 mg/dL    Creatinine 0 86 0 60 - 1 35 mg/dL    eGFR Non  108 > OR = 60 mL/min/1 73m2    SL AMB EGFR  125 > OR = 60 mL/min/1 73m2    SL AMB BUN/CREATININE RATIO NOT APPLICABLE 6 - 22 (calc)    Sodium 138 135 - 146 mmol/L    Potassium 4 3 3 5 - 5 3 mmol/L    Chloride 104 98 - 110 mmol/L    CO2 27 20 - 32 mmol/L    SL AMB CALCIUM 9 5 8 6 - 10 3 mg/dL    SL AMB PROTEIN, TOTAL 7 2 6 1 - 8 1 g/dL    Albumin 4 6 3 6 - 5 1 g/dL    Globulin 2 6 1 9 - 3 7 g/dL (calc)    Albumin/Globulin Ratio 1 8 1 0 - 2 5 (calc)    TOTAL BILIRUBIN 0 4 0 2 - 1 2 mg/dL    Alkaline Phosphatase 85 40 - 115 U/L    SL AMB AST 23 10 - 40 U/L    SL AMB ALT 39 9 - 46 U/L   Microalbumin, Random Urine (W/Creatinine)    Collection Time: 12/20/18  6:48 AM   Result Value Ref Range    Creatinine, Urine 139 20 - 320 mg/dL    Microalbum  ,U,Random 0 6 See Note: mg/dL    Microalb/Creat Ratio 4 <30 mcg/mg creat    Comment(s)     CBC and differential    Collection Time: 12/20/18  6:48 AM   Result Value Ref Range    White Blood Cell Count 8 9 3 8 - 10 8 Thousand/uL    Red Blood Cell Count 4 83 4 20 - 5 80 Million/uL    Hemoglobin 14 6 13 2 - 17 1 g/dL    HCT 41 8 38 5 - 50 0 %    MCV 86 5 80 0 - 100 0 fL    MCH 30 2 27 0 - 33 0 pg    MCHC 34 9 32 0 - 36 0 g/dL    RDW 13 6 11 0 - 15 0 %    Platelet Count 172 235 - 400 Thousand/uL    SL AMB MPV 10 2 7 5 - 12 5 fL    Neutrophils (Absolute) 4,735 1,500 - 7,800 cells/uL    Lymphocytes (Absolute) 3,142 850 - 3,900 cells/uL    Monocytes (Absolute) 632 200 - 950 cells/uL    Eosinophils (Absolute) 294 15 - 500 cells/uL    Basophils ABS 98 0 - 200 cells/uL    Neutrophils 53 2 %    Lymphocytes 35 3 %    Monocytes 7 1 %    Eosinophils 3 3 %    Basophils PCT 1 1 %   TSH, 3rd generation with Free T4 reflex    Collection Time: 12/20/18  6:48 AM   Result Value Ref Range    TSH W/RFX TO FREE T4 1 08 0 40 - 4 50 mIU/L   Hemoglobin A1c (w/out EAG)    Collection Time: 12/20/18  6:48 AM   Result Value Ref Range    Hemoglobin A1C 5 9 (H) <5 7 % of total Hgb       Assessment/Plan:    Patient's blood pressure is well controlled now  Encouraged to continue losartan 50 milligram on a daily basis  Healthy diet and regular exercise emphasized  Patient encouraged to stop smoking  Continue statin  Patient is currently smoking but wants to quit  Educate on the negative effects of Tobacco   Recommend quitting smoking   Listed cessation options,  Including smoking cessation program    Patient wants to  Make an effort on his own by reducing the number of cigarettes  If he is unable to do so then he will follow up again to discuss treatment options  Diagnoses and all orders for this visit:    Prediabetes    Essential hypertension          BMI Counseling: Body mass index is 35 19 kg/m²  Discussed the patient's BMI with him  The BMI is above average  BMI counseling and education was provided to the patient  Nutrition recommendations include reducing portion sizes, decreasing overall calorie intake, 3-5 servings of fruits/vegetables daily, reducing fast food intake, consuming healthier snacks, decreasing soda and/or juice intake, moderation in carbohydrate intake, increasing intake of lean protein, reducing intake of saturated fat and trans fat and reducing intake of cholesterol  Exercise recommendations include moderate aerobic physical activity for 150 minutes/week  3 month followup with labs advised

## 2019-01-28 ENCOUNTER — OFFICE VISIT (OUTPATIENT)
Dept: OBGYN CLINIC | Facility: MEDICAL CENTER | Age: 44
End: 2019-01-28
Payer: COMMERCIAL

## 2019-01-28 VITALS
HEART RATE: 94 BPM | DIASTOLIC BLOOD PRESSURE: 79 MMHG | WEIGHT: 218 LBS | BODY MASS INDEX: 35.03 KG/M2 | SYSTOLIC BLOOD PRESSURE: 139 MMHG | HEIGHT: 66 IN

## 2019-01-28 DIAGNOSIS — M77.12 LATERAL EPICONDYLITIS OF BOTH ELBOWS: Primary | ICD-10-CM

## 2019-01-28 DIAGNOSIS — M77.11 LATERAL EPICONDYLITIS OF BOTH ELBOWS: Primary | ICD-10-CM

## 2019-01-28 PROCEDURE — 99213 OFFICE O/P EST LOW 20 MIN: CPT | Performed by: ORTHOPAEDIC SURGERY

## 2019-01-28 NOTE — PROGRESS NOTES
CHIEF COMPLAINT:  Chief Complaint   Patient presents with    Left Elbow - Follow-up    Right Elbow - Follow-up       SUBJECTIVE:  Clif Bundy is a 41y o  year old  male who presents to the office S/P bilateral lateral epicondylitis cortisone steroid injections administered 01/07/2019  Patient states his left elbow is 100% better  He is able to  and grasp and left in full  Patient states that his right elbow is about 80% better  He states he has occasional shooting pain in his right elbow  PAST MEDICAL HISTORY:  Past Medical History:   Diagnosis Date    Diverticulitis     Hypertension     Prediabetes     Psoriasis        PAST SURGICAL HISTORY:  History reviewed  No pertinent surgical history  FAMILY HISTORY:  Family History   Problem Relation Age of Onset    Diabetes Maternal Grandmother     Diabetes Paternal Grandfather        SOCIAL HISTORY:  Social History   Substance Use Topics    Smoking status: Heavy Tobacco Smoker     Packs/day: 2 00     Years: 25 00     Types: Cigarettes    Smokeless tobacco: Current User     Types: Chew    Alcohol use Yes      Comment: occas       MEDICATIONS:    Current Outpatient Prescriptions:     Cholecalciferol (VITAMIN D) 2000 units CAPS, Take 1 capsule by mouth daily, Disp: , Rfl:     Hydrocortisone Butyrate 0 1 % CREA, APPLY TO SKIN DAILY, Disp: , Rfl: 3    losartan (COZAAR) 50 mg tablet, Take 1 tablet (50 mg total) by mouth daily, Disp: 90 tablet, Rfl: 0    Multiple Vitamin (MULTIVITAMIN) capsule, Take 1 capsule by mouth daily, Disp: , Rfl:     rosuvastatin (CRESTOR) 5 mg tablet, Take 1 tablet (5 mg total) by mouth daily, Disp: 90 tablet, Rfl: 1    Ustekinumab (STELARA SC), Inject under the skin, Disp: , Rfl:     ALLERGIES:  No Known Allergies    REVIEW OF SYSTEMS:  Review of Systems   Constitutional: Negative for chills, fever and unexpected weight change  HENT: Negative for hearing loss, nosebleeds and sore throat      Eyes: Negative for pain, redness and visual disturbance  Respiratory: Negative for cough, shortness of breath and wheezing  Cardiovascular: Negative for chest pain, palpitations and leg swelling  Gastrointestinal: Negative for abdominal pain, nausea and vomiting  Endocrine: Negative for polydipsia and polyuria  Genitourinary: Negative for dysuria and hematuria  Musculoskeletal: Negative for arthralgias, joint swelling and myalgias  Skin: Negative for rash and wound  Neurological: Negative for light-headedness, numbness and headaches  Psychiatric/Behavioral: Negative for decreased concentration, dysphoric mood and suicidal ideas  The patient is not nervous/anxious  VITALS:  Vitals:    01/28/19 1519   BP: 139/79   Pulse: 94       LABS:  HgA1c:   Lab Results   Component Value Date    HGBA1C 5 9 (H) 12/20/2018     BMP:   Lab Results   Component Value Date    CALCIUM 9 5 12/20/2018    K 4 3 12/20/2018    CO2 27 12/20/2018     12/20/2018    BUN 16 12/20/2018    CREATININE 1 03 09/06/2018       _____________________________________________________  PHYSICAL EXAMINATION:  General: well developed and well nourished, alert, oriented times 3 and appears comfortable  Psychiatric: Normal  HEENT: Trachea Midline, No torticollis  Pulmonary: No audible wheezing or respiratory distress   Skin: No masses, erthema, lacerations, fluctation, ulcerations  Neurovascular: Sensation Intact to the Median, Ulnar, Radial Nerve, Motor Intact to the Median, Ulnar, Radial Nerve and Pulses Intact    MUSCULOSKELETAL EXAMINATION:  Bilateral elbows  No swelling or deformity  Full range of motion with flexion, extension, supination and pronation  Nontender to palpation  No pain with passive flexion of the wrist with elbow fully extended  No pain with resisted wrist extension with elbow fully extended  No pain with resisted forearm supination with the elbow fully extended    Negative tinels over the ulnar nerve at the elbow     ___________________________________________________  STUDIES REVIEWED:  No studies reviewed  PROCEDURES PERFORMED:  Procedures  No Procedures performed today    _____________________________________________________  ASSESSMENT/PLAN:    Bilateral CSI administered for lateral epicondylitis on 01/07/2019 with 80% resolution of symptoms  * patient was advised to continue application of he and stretch as well as extension exercises as shown in the office  * patient was advised to call the office if he has return of discomfort    Follow Up:  Return if symptoms worsen or fail to improve        To Do Next Visit:  Re-evaluation of current issue      Scribe Attestation    I,:   Darian Ryan am acting as a scribe while in the presence of the attending physician :        I,:   Carlos Odell MD personally performed the services described in this documentation    as scribed in my presence :

## 2019-01-31 DIAGNOSIS — I10 ESSENTIAL HYPERTENSION: ICD-10-CM

## 2019-01-31 RX ORDER — LISINOPRIL 20 MG/1
TABLET ORAL
Qty: 30 TABLET | Refills: 0 | OUTPATIENT
Start: 2019-01-31

## 2019-02-21 ENCOUNTER — ANESTHESIA EVENT (OUTPATIENT)
Dept: PERIOP | Facility: AMBULARY SURGERY CENTER | Age: 44
End: 2019-02-21
Payer: COMMERCIAL

## 2019-03-06 ENCOUNTER — ANESTHESIA (OUTPATIENT)
Dept: PERIOP | Facility: AMBULARY SURGERY CENTER | Age: 44
End: 2019-03-06
Payer: COMMERCIAL

## 2019-04-02 ENCOUNTER — OFFICE VISIT (OUTPATIENT)
Dept: FAMILY MEDICINE CLINIC | Facility: CLINIC | Age: 44
End: 2019-04-02
Payer: COMMERCIAL

## 2019-04-02 VITALS
HEART RATE: 84 BPM | HEIGHT: 66 IN | WEIGHT: 218 LBS | DIASTOLIC BLOOD PRESSURE: 90 MMHG | BODY MASS INDEX: 35.03 KG/M2 | SYSTOLIC BLOOD PRESSURE: 142 MMHG | OXYGEN SATURATION: 95 %

## 2019-04-02 DIAGNOSIS — F17.200 SMOKER: Primary | ICD-10-CM

## 2019-04-02 DIAGNOSIS — E78.5 HYPERLIPIDEMIA, UNSPECIFIED HYPERLIPIDEMIA TYPE: ICD-10-CM

## 2019-04-02 DIAGNOSIS — I10 ESSENTIAL HYPERTENSION: ICD-10-CM

## 2019-04-02 PROBLEM — Z13.220 ENCOUNTER FOR LIPID SCREENING FOR CARDIOVASCULAR DISEASE: Status: RESOLVED | Noted: 2018-12-05 | Resolved: 2019-04-02

## 2019-04-02 PROBLEM — Z13.6 ENCOUNTER FOR LIPID SCREENING FOR CARDIOVASCULAR DISEASE: Status: RESOLVED | Noted: 2018-12-05 | Resolved: 2019-04-02

## 2019-04-02 PROBLEM — M77.11 LATERAL EPICONDYLITIS OF BOTH ELBOWS: Status: RESOLVED | Noted: 2018-12-05 | Resolved: 2019-04-02

## 2019-04-02 PROBLEM — M77.12 LATERAL EPICONDYLITIS OF BOTH ELBOWS: Status: RESOLVED | Noted: 2018-12-05 | Resolved: 2019-04-02

## 2019-04-02 PROCEDURE — 99214 OFFICE O/P EST MOD 30 MIN: CPT | Performed by: FAMILY MEDICINE

## 2019-04-02 RX ORDER — VARENICLINE TARTRATE 25 MG
KIT ORAL
Qty: 53 TABLET | Refills: 0 | Status: SHIPPED | OUTPATIENT
Start: 2019-04-02 | End: 2019-09-03 | Stop reason: ALTCHOICE

## 2019-04-02 RX ORDER — LOSARTAN POTASSIUM 50 MG/1
50 TABLET ORAL DAILY
Qty: 90 TABLET | Refills: 0 | Status: SHIPPED | OUTPATIENT
Start: 2019-04-02 | End: 2019-07-05 | Stop reason: SDUPTHER

## 2019-04-02 RX ORDER — ROSUVASTATIN CALCIUM 5 MG/1
5 TABLET, COATED ORAL DAILY
Qty: 90 TABLET | Refills: 0 | Status: SHIPPED | OUTPATIENT
Start: 2019-04-02 | End: 2019-07-09 | Stop reason: SDUPTHER

## 2019-04-03 ENCOUNTER — HOSPITAL ENCOUNTER (OUTPATIENT)
Facility: AMBULARY SURGERY CENTER | Age: 44
Setting detail: OUTPATIENT SURGERY
Discharge: HOME | End: 2019-04-03
Attending: COLON & RECTAL SURGERY | Admitting: COLON & RECTAL SURGERY
Payer: COMMERCIAL

## 2019-04-03 VITALS
TEMPERATURE: 96.9 F | WEIGHT: 208 LBS | OXYGEN SATURATION: 96 % | BODY MASS INDEX: 33.43 KG/M2 | RESPIRATION RATE: 19 BRPM | SYSTOLIC BLOOD PRESSURE: 137 MMHG | DIASTOLIC BLOOD PRESSURE: 70 MMHG | HEIGHT: 66 IN | HEART RATE: 79 BPM

## 2019-04-03 PROCEDURE — 45378 DIAGNOSTIC COLONOSCOPY: CPT | Performed by: COLON & RECTAL SURGERY

## 2019-04-03 RX ORDER — LIDOCAINE HYDROCHLORIDE 10 MG/ML
INJECTION, SOLUTION INFILTRATION; PERINEURAL AS NEEDED
Status: DISCONTINUED | OUTPATIENT
Start: 2019-04-03 | End: 2019-04-03 | Stop reason: SURG

## 2019-04-03 RX ORDER — PROPOFOL 10 MG/ML
INJECTION, EMULSION INTRAVENOUS AS NEEDED
Status: DISCONTINUED | OUTPATIENT
Start: 2019-04-03 | End: 2019-04-03 | Stop reason: SURG

## 2019-04-03 RX ORDER — SODIUM CHLORIDE 9 MG/ML
125 INJECTION, SOLUTION INTRAVENOUS CONTINUOUS
Status: DISCONTINUED | OUTPATIENT
Start: 2019-04-03 | End: 2019-04-03 | Stop reason: HOSPADM

## 2019-04-03 RX ADMIN — PROPOFOL 50 MG: 10 INJECTION, EMULSION INTRAVENOUS at 09:47

## 2019-04-03 RX ADMIN — PROPOFOL 100 MG: 10 INJECTION, EMULSION INTRAVENOUS at 09:39

## 2019-04-03 RX ADMIN — PROPOFOL 50 MG: 10 INJECTION, EMULSION INTRAVENOUS at 09:41

## 2019-04-03 RX ADMIN — PROPOFOL 50 MG: 10 INJECTION, EMULSION INTRAVENOUS at 09:43

## 2019-04-03 RX ADMIN — SODIUM CHLORIDE 125 ML/HR: 0.9 INJECTION, SOLUTION INTRAVENOUS at 09:03

## 2019-04-03 RX ADMIN — PROPOFOL 50 MG: 10 INJECTION, EMULSION INTRAVENOUS at 09:45

## 2019-04-03 RX ADMIN — LIDOCAINE HYDROCHLORIDE ANHYDROUS 50 MG: 10 INJECTION, SOLUTION INFILTRATION at 09:39

## 2019-04-10 ENCOUNTER — OFFICE VISIT (OUTPATIENT)
Dept: FAMILY MEDICINE CLINIC | Facility: CLINIC | Age: 44
End: 2019-04-10
Payer: COMMERCIAL

## 2019-04-10 VITALS
OXYGEN SATURATION: 98 % | HEIGHT: 66 IN | WEIGHT: 218 LBS | BODY MASS INDEX: 35.03 KG/M2 | TEMPERATURE: 100 F | DIASTOLIC BLOOD PRESSURE: 80 MMHG | HEART RATE: 97 BPM | SYSTOLIC BLOOD PRESSURE: 138 MMHG

## 2019-04-10 DIAGNOSIS — J45.41 MODERATE PERSISTENT ASTHMA WITH ACUTE EXACERBATION: Primary | ICD-10-CM

## 2019-04-10 DIAGNOSIS — F17.200 SMOKER: ICD-10-CM

## 2019-04-10 DIAGNOSIS — J40 BRONCHITIS: ICD-10-CM

## 2019-04-10 PROCEDURE — 3008F BODY MASS INDEX DOCD: CPT | Performed by: FAMILY MEDICINE

## 2019-04-10 PROCEDURE — 99214 OFFICE O/P EST MOD 30 MIN: CPT | Performed by: FAMILY MEDICINE

## 2019-04-10 RX ORDER — AZITHROMYCIN 250 MG/1
TABLET, FILM COATED ORAL
Qty: 6 TABLET | Refills: 0 | Status: SHIPPED | OUTPATIENT
Start: 2019-04-10 | End: 2019-04-13

## 2019-04-10 RX ORDER — BUDESONIDE AND FORMOTEROL FUMARATE DIHYDRATE 80; 4.5 UG/1; UG/1
2 AEROSOL RESPIRATORY (INHALATION) 2 TIMES DAILY
Qty: 1 INHALER | Refills: 0 | Status: SHIPPED | COMMUNITY
Start: 2019-04-10 | End: 2019-07-09 | Stop reason: SDUPTHER

## 2019-04-10 RX ORDER — ALBUTEROL SULFATE 90 UG/1
2 AEROSOL, METERED RESPIRATORY (INHALATION) EVERY 6 HOURS PRN
Qty: 1 INHALER | Refills: 0 | Status: SHIPPED | OUTPATIENT
Start: 2019-04-10 | End: 2019-07-09 | Stop reason: SDUPTHER

## 2019-05-05 LAB
ALBUMIN SERPL-MCNC: 4.4 G/DL (ref 3.6–5.1)
ALBUMIN/GLOB SERPL: 1.7 (CALC) (ref 1–2.5)
ALP SERPL-CCNC: 88 U/L (ref 40–115)
ALT SERPL-CCNC: 37 U/L (ref 9–46)
AST SERPL-CCNC: 21 U/L (ref 10–40)
BILIRUB SERPL-MCNC: 0.5 MG/DL (ref 0.2–1.2)
BUN SERPL-MCNC: 16 MG/DL (ref 7–25)
BUN/CREAT SERPL: NORMAL (CALC) (ref 6–22)
CALCIUM SERPL-MCNC: 9.6 MG/DL (ref 8.6–10.3)
CHLORIDE SERPL-SCNC: 103 MMOL/L (ref 98–110)
CHOLEST SERPL-MCNC: 150 MG/DL
CHOLEST/HDLC SERPL: 4.3 (CALC)
CO2 SERPL-SCNC: 29 MMOL/L (ref 20–32)
CREAT SERPL-MCNC: 0.98 MG/DL (ref 0.6–1.35)
GLOBULIN SER CALC-MCNC: 2.6 G/DL (CALC) (ref 1.9–3.7)
GLUCOSE SERPL-MCNC: 89 MG/DL (ref 65–99)
HBA1C MFR BLD: 6.1 % OF TOTAL HGB
HDLC SERPL-MCNC: 35 MG/DL
LDLC SERPL CALC-MCNC: 95 MG/DL (CALC)
NONHDLC SERPL-MCNC: 115 MG/DL (CALC)
POTASSIUM SERPL-SCNC: 4.6 MMOL/L (ref 3.5–5.3)
PROT SERPL-MCNC: 7 G/DL (ref 6.1–8.1)
SL AMB EGFR AFRICAN AMERICAN: 110 ML/MIN/1.73M2
SL AMB EGFR NON AFRICAN AMERICAN: 95 ML/MIN/1.73M2
SODIUM SERPL-SCNC: 138 MMOL/L (ref 135–146)
TRIGL SERPL-MCNC: 102 MG/DL

## 2019-05-09 ENCOUNTER — HOSPITAL ENCOUNTER (EMERGENCY)
Facility: HOSPITAL | Age: 44
Discharge: HOME | End: 2019-05-09
Attending: EMERGENCY MEDICINE | Admitting: EMERGENCY MEDICINE
Payer: COMMERCIAL

## 2019-05-09 VITALS
HEART RATE: 78 BPM | TEMPERATURE: 98 F | SYSTOLIC BLOOD PRESSURE: 145 MMHG | OXYGEN SATURATION: 97 % | BODY MASS INDEX: 35.19 KG/M2 | WEIGHT: 218.03 LBS | DIASTOLIC BLOOD PRESSURE: 80 MMHG | RESPIRATION RATE: 20 BRPM

## 2019-05-09 DIAGNOSIS — T15.92XA FOREIGN BODY OF LEFT EYE, INITIAL ENCOUNTER: Primary | ICD-10-CM

## 2019-05-09 DIAGNOSIS — T15.92XA FOREIGN BODY OF LEFT EYE: ICD-10-CM

## 2019-05-09 PROCEDURE — 99283 EMERGENCY DEPT VISIT LOW MDM: CPT

## 2019-05-09 PROCEDURE — 65222 REMOVE FOREIGN BODY FROM EYE: CPT | Performed by: PHYSICIAN ASSISTANT

## 2019-05-09 PROCEDURE — 99283 EMERGENCY DEPT VISIT LOW MDM: CPT | Performed by: PHYSICIAN ASSISTANT

## 2019-05-09 RX ORDER — NAPROXEN 500 MG/1
500 TABLET ORAL 2 TIMES DAILY WITH MEALS
Qty: 30 TABLET | Refills: 0 | Status: SHIPPED | OUTPATIENT
Start: 2019-05-09 | End: 2019-09-03 | Stop reason: ALTCHOICE

## 2019-05-09 RX ORDER — TOBRAMYCIN 3 MG/ML
1 SOLUTION/ DROPS OPHTHALMIC
Qty: 1 BOTTLE | Refills: 0 | Status: SHIPPED | OUTPATIENT
Start: 2019-05-09 | End: 2019-07-09

## 2019-05-09 RX ORDER — TETRACAINE HYDROCHLORIDE 5 MG/ML
1 SOLUTION OPHTHALMIC ONCE
Status: COMPLETED | OUTPATIENT
Start: 2019-05-09 | End: 2019-05-09

## 2019-05-09 RX ADMIN — TETRACAINE HYDROCHLORIDE 1 DROP: 5 SOLUTION OPHTHALMIC at 19:14

## 2019-05-09 RX ADMIN — FLUORESCEIN SODIUM 1 STRIP: 1 STRIP OPHTHALMIC at 19:14

## 2019-07-05 DIAGNOSIS — I10 ESSENTIAL HYPERTENSION: ICD-10-CM

## 2019-07-05 RX ORDER — LOSARTAN POTASSIUM 50 MG/1
TABLET ORAL
Qty: 90 TABLET | Refills: 0 | Status: SHIPPED | OUTPATIENT
Start: 2019-07-05 | End: 2019-07-09 | Stop reason: SDUPTHER

## 2019-07-09 ENCOUNTER — OFFICE VISIT (OUTPATIENT)
Dept: FAMILY MEDICINE CLINIC | Facility: CLINIC | Age: 44
End: 2019-07-09
Payer: COMMERCIAL

## 2019-07-09 VITALS
OXYGEN SATURATION: 96 % | DIASTOLIC BLOOD PRESSURE: 82 MMHG | SYSTOLIC BLOOD PRESSURE: 144 MMHG | RESPIRATION RATE: 16 BRPM | HEIGHT: 66 IN | BODY MASS INDEX: 35.36 KG/M2 | HEART RATE: 91 BPM | WEIGHT: 220 LBS

## 2019-07-09 DIAGNOSIS — F17.200 SMOKER: ICD-10-CM

## 2019-07-09 DIAGNOSIS — I10 ESSENTIAL HYPERTENSION: ICD-10-CM

## 2019-07-09 DIAGNOSIS — R73.03 PREDIABETES: Primary | ICD-10-CM

## 2019-07-09 DIAGNOSIS — J40 BRONCHITIS: ICD-10-CM

## 2019-07-09 DIAGNOSIS — E78.5 HYPERLIPIDEMIA, UNSPECIFIED HYPERLIPIDEMIA TYPE: ICD-10-CM

## 2019-07-09 DIAGNOSIS — J45.41 MODERATE PERSISTENT ASTHMA WITH ACUTE EXACERBATION: ICD-10-CM

## 2019-07-09 PROBLEM — T15.92XA FOREIGN BODY OF LEFT EYE: Status: RESOLVED | Noted: 2019-05-09 | Resolved: 2019-07-09

## 2019-07-09 PROBLEM — K57.92 DIVERTICULITIS: Status: RESOLVED | Noted: 2018-12-05 | Resolved: 2019-07-09

## 2019-07-09 PROBLEM — J41.0 SIMPLE CHRONIC BRONCHITIS (HCC): Status: ACTIVE | Noted: 2019-07-09

## 2019-07-09 PROCEDURE — 3008F BODY MASS INDEX DOCD: CPT | Performed by: FAMILY MEDICINE

## 2019-07-09 PROCEDURE — 99215 OFFICE O/P EST HI 40 MIN: CPT | Performed by: FAMILY MEDICINE

## 2019-07-09 PROCEDURE — 3725F SCREEN DEPRESSION PERFORMED: CPT | Performed by: FAMILY MEDICINE

## 2019-07-09 RX ORDER — ROSUVASTATIN CALCIUM 5 MG/1
5 TABLET, COATED ORAL DAILY
Qty: 90 TABLET | Refills: 0 | Status: SHIPPED | OUTPATIENT
Start: 2019-07-09 | End: 2019-10-10 | Stop reason: SDUPTHER

## 2019-07-09 RX ORDER — ALBUTEROL SULFATE 90 UG/1
2 AEROSOL, METERED RESPIRATORY (INHALATION) EVERY 6 HOURS PRN
Qty: 1 INHALER | Refills: 0 | Status: SHIPPED | OUTPATIENT
Start: 2019-07-09 | End: 2019-12-13 | Stop reason: SDUPTHER

## 2019-07-09 RX ORDER — LOSARTAN POTASSIUM 50 MG/1
50 TABLET ORAL DAILY
Qty: 90 TABLET | Refills: 0 | Status: SHIPPED | OUTPATIENT
Start: 2019-07-09 | End: 2019-10-10 | Stop reason: SDUPTHER

## 2019-07-09 RX ORDER — BUDESONIDE AND FORMOTEROL FUMARATE DIHYDRATE 80; 4.5 UG/1; UG/1
2 AEROSOL RESPIRATORY (INHALATION) 2 TIMES DAILY
Qty: 1 INHALER | Refills: 2 | Status: SHIPPED | OUTPATIENT
Start: 2019-07-09 | End: 2019-12-13 | Stop reason: SDUPTHER

## 2019-07-09 NOTE — ASSESSMENT & PLAN NOTE
Patient is currently smoking but wants to quit  Educate on the negative effects of Tobacco   Recommend quitting smoking  Listed cessation options,  Including smoking cessation program    Patient wants to  Make an effort on his own by reducing the number of cigarettes  Advised to get chantix script filled at pharmacy

## 2019-07-09 NOTE — PROGRESS NOTES
Subjective:      Patient ID: Coleen Stevenson is a 45 y o  male  Hypertension   This is a chronic problem  The current episode started more than 1 year ago  The problem is controlled  Pertinent negatives include no chest pain, headaches, palpitations, peripheral edema or shortness of breath  Risk factors for coronary artery disease include dyslipidemia, male gender, obesity and smoking/tobacco exposure  Treatments tried: Losartan 50 mg daily  The current treatment provides moderate improvement  Compliance problems include diet and exercise  Hyperlipidemia   This is a chronic problem  The current episode started more than 1 year ago  The problem is controlled  Recent lipid tests were reviewed and are normal  Pertinent negatives include no chest pain, myalgias or shortness of breath  Current antihyperlipidemic treatment includes statins  The current treatment provides significant improvement of lipids  There are no compliance problems  Risk factors for coronary artery disease include dyslipidemia, hypertension, male sex and obesity (Prediabetes)  Review of his labs revealed an A1c of 6 1, fasting blood sugar is within normal limit  Patient continues to smoke cigarettes  Was prescribed chantix and   inhalers but he did not use them  Continues to have cough, which is nonproductive  Patient denies any symptoms of shortness of breath fever or chills  Past Medical History:   Diagnosis Date    Diverticulitis     Hyperlipemia     Hypertension     Prediabetes     Psoriasis        Family History   Problem Relation Age of Onset    Diabetes Maternal Grandmother     Diabetes Paternal Grandfather        Past Surgical History:   Procedure Laterality Date    MA COLONOSCOPY FLX DX W/COLLJ [address] [facility] [address] WHEN PFRMD N/A 4/3/2019    Procedure: COLONOSCOPY;  Surgeon: Eliza Cartagena MD;  Location: [facility]; Service: Colorectal        reports that he has been smoking cigarettes   He has a 50 00 pack-year smoking history  His smokeless tobacco use includes chew  He reports that he does not drink alcohol or use drugs  Current Outpatient Medications:     albuterol (PROVENTIL HFA,VENTOLIN HFA) 90 mcg/act inhaler, Inhale 2 puffs every 6 (six) hours as needed for wheezing or shortness of breath, Disp: 1 Inhaler, Rfl: 0    budesonide-formoterol (SYMBICORT) 80-4 5 MCG/ACT inhaler, Inhale 2 puffs 2 (two) times a day Rinse mouth after use , Disp: 1 Inhaler, Rfl: 2    Cholecalciferol (VITAMIN D) 2000 units CAPS, Take 1 capsule by mouth daily, Disp: , Rfl:     Hydrocortisone Butyrate 0 1 % CREA, APPLY TO SKIN DAILY, Disp: , Rfl: 3    losartan (COZAAR) 50 mg tablet, Take 1 tablet (50 mg total) by mouth daily, Disp: 90 tablet, Rfl: 0    Multiple Vitamin (MULTIVITAMIN) capsule, Take 1 capsule by mouth daily, Disp: , Rfl:     naproxen (NAPROSYN) 500 mg tablet, Take 1 tablet (500 mg total) by mouth 2 (two) times a day with meals, Disp: 30 tablet, Rfl: 0    rosuvastatin (CRESTOR) 5 mg tablet, Take 1 tablet (5 mg total) by mouth daily, Disp: 90 tablet, Rfl: 0    Ustekinumab (STELARA SC), Inject under the skin, Disp: , Rfl:     varenicline (CHANTIX EMILY) 0 5 MG X 11 & 1 MG X 42 tablet, Take one 0 5mg tab by mouth 1x daily for 3 days, then increase to one 0 5mg tab 2x daily for 3 days, then increase to one 1mg tab 2x daily, Disp: 53 tablet, Rfl: 0    The following portions of the patient's history were reviewed and updated as appropriate: allergies, current medications, past family history, past medical history, past social history, past surgical history and problem list     Review of Systems   Constitutional: Negative  Respiratory: Positive for cough  Negative for shortness of breath  Cardiovascular: Negative  Negative for chest pain and palpitations  Gastrointestinal: Negative  Musculoskeletal: Negative  Negative for myalgias  Neurological: Negative  Negative for headaches  Psychiatric/Behavioral: Negative  Objective:    /82   Pulse 91   Resp 16   Ht 5' 6" (1 676 m)   Wt 99 8 kg (220 lb)   SpO2 96%   BMI 35 51 kg/m²      Physical Exam   Constitutional: He is oriented to person, place, and time  He appears well-developed and well-nourished  Cardiovascular: Normal rate, regular rhythm and normal heart sounds  Pulmonary/Chest: Effort normal and breath sounds normal    Abdominal: Soft  Bowel sounds are normal    Neurological: He is alert and oriented to person, place, and time  Psychiatric: His behavior is normal  Judgment normal          No results found for this or any previous visit (from the past 1008 hour(s))  Assessment/Plan:         Problem List Items Addressed This Visit        Respiratory    RESOLVED: Moderate persistent asthma with acute exacerbation    Relevant Medications    budesonide-formoterol (SYMBICORT) 80-4 5 MCG/ACT inhaler    albuterol (PROVENTIL HFA,VENTOLIN HFA) 90 mcg/act inhaler       Cardiovascular and Mediastinum    Essential hypertension     Borderline high  Continue losaratn 50 mg  Patient wants to change his diet, will monitor BP at home  Will fup x 3 months / sooner if BP is > 140/90         Relevant Medications    losartan (COZAAR) 50 mg tablet    Other Relevant Orders    Microalbumin / creatinine urine ratio       Other    Prediabetes - Primary     A1c 6 1  Suggested diet modifications, regular exercise  Relevant Orders    Comprehensive metabolic panel    Hemoglobin A1C    Smoker      Patient is currently smoking but wants to quit  Educate on the negative effects of Tobacco   Recommend quitting smoking  Listed cessation options,  Including smoking cessation program    Patient wants to  Make an effort on his own by reducing the number of cigarettes  Advised to get chantix script filled at pharmacy              Relevant Medications    budesonide-formoterol (SYMBICORT) 80-4 5 MCG/ACT inhaler    albuterol (PROVENTIL HFA,VENTOLIN HFA) 90 mcg/act inhaler Hyperlipidemia     Improved significantly with statin, continue same  Labs x 6 months  Relevant Medications    rosuvastatin (CRESTOR) 5 mg tablet    Other Relevant Orders    Lipid panel      Other Visit Diagnoses     Bronchitis        Relevant Medications    budesonide-formoterol (SYMBICORT) 80-4 5 MCG/ACT inhaler    albuterol (PROVENTIL HFA,VENTOLIN HFA) 90 mcg/act inhaler        I have spent 40 minutes with Patient  today in which greater than 50% of this time was spent in counseling/coordination of care regarding Diagnostic results, Prognosis, Risks and benefits of tx options, Intructions for management, Patient and family education, Importance of tx compliance, Risk factor reductions and Impressions

## 2019-07-09 NOTE — ASSESSMENT & PLAN NOTE
Borderline high  Continue losaratn 50 mg  Patient wants to change his diet, will monitor BP at home   Will fup x 3 months / sooner if BP is > 140/90

## 2019-09-03 ENCOUNTER — OFFICE VISIT (OUTPATIENT)
Dept: FAMILY MEDICINE CLINIC | Facility: CLINIC | Age: 44
End: 2019-09-03
Payer: COMMERCIAL

## 2019-09-03 VITALS
RESPIRATION RATE: 18 BRPM | DIASTOLIC BLOOD PRESSURE: 78 MMHG | HEART RATE: 95 BPM | WEIGHT: 217 LBS | SYSTOLIC BLOOD PRESSURE: 120 MMHG | HEIGHT: 66 IN | OXYGEN SATURATION: 97 % | BODY MASS INDEX: 34.87 KG/M2

## 2019-09-03 DIAGNOSIS — K57.92 ACUTE DIVERTICULITIS: Primary | ICD-10-CM

## 2019-09-03 PROCEDURE — 99214 OFFICE O/P EST MOD 30 MIN: CPT | Performed by: NURSE PRACTITIONER

## 2019-09-03 RX ORDER — METRONIDAZOLE 500 MG/1
500 TABLET ORAL EVERY 8 HOURS SCHEDULED
Qty: 30 TABLET | Refills: 0 | Status: SHIPPED | OUTPATIENT
Start: 2019-09-03 | End: 2019-09-13

## 2019-09-03 RX ORDER — CIPROFLOXACIN 500 MG/1
500 TABLET, FILM COATED ORAL EVERY 12 HOURS SCHEDULED
Qty: 20 TABLET | Refills: 0 | Status: SHIPPED | OUTPATIENT
Start: 2019-09-03 | End: 2019-09-13

## 2019-09-03 NOTE — PROGRESS NOTES
Assessment/Plan:      Diagnoses and all orders for this visit:    Acute diverticulitis  -     metroNIDAZOLE (FLAGYL) 500 mg tablet; Take 1 tablet (500 mg total) by mouth every 8 (eight) hours for 10 days  -     ciprofloxacin (CIPRO) 500 mg tablet; Take 1 tablet (500 mg total) by mouth every 12 (twelve) hours for 10 days       Patient reports left lower quadrant pain  Tenderness noted on palpation of the left lower abdominal quadrant  Patient is unable to give a urine sample  Patient has a history of diverticulitis and diverticulosis noted on colonoscopy  Discussed with the patient that his symptoms are most likely caused by diverticulitis  Flagyl and ciprofloxacin prescribed  Medication information and side effects reviewed  Patient instructed to eat a clear liquid diet  Patient instructed to follow-up in 2 days or sooner prn  Reviewed signs and symptoms that warrant going to the ER  Subjective:     Patient ID: Alejandro Strong is a 45 y o  male  Patient reports lower abdominal pain for the past few days especially in his left lower abdomen  Patient reports that the pain is fairly constant  Patient rates the pain as 4-5 on 0-10 scale  Patient describes the pain as sharp  Denies any fever, nausea, vomiting, or diarrhea  Denies any blood in the stool  Patient reports that he had a normal bowel movement this morning  Denies any dysuria, urinary frequency, urgency, or hematuria  Patient reports that he has had 2 prior episodes of diverticulitis  Patient reports that he thinks he is having a flare up of diverticulitis again  Patient reports that he was given oral antibiotics before in the ER and it went away  Patient reports that he followed up with GI within the past year and had a colonscopy done  Review of Systems   Constitutional: Negative for chills and fever  Respiratory: Negative for chest tightness and shortness of breath  Cardiovascular: Negative for chest pain and palpitations  Gastrointestinal:        As noted in HPI  Genitourinary: Negative for dysuria, frequency and hematuria  Musculoskeletal: Negative for myalgias  Skin: Negative for rash  Neurological: Negative for dizziness, light-headedness and headaches  Objective:     Physical Exam   Constitutional: He is oriented to person, place, and time  Patient appears alittle uncomfortable  HENT:   Right Ear: External ear normal    Left Ear: External ear normal    Mouth/Throat: Oropharynx is clear and moist    Cardiovascular: Normal rate, regular rhythm and normal heart sounds  Pulmonary/Chest: Effort normal and breath sounds normal    Abdominal: Soft  Bowel sounds are normal  He exhibits no mass  Tenderness noted by the patient on palpation of the left lower quadrant  Musculoskeletal:   Gait wnl  Neurological: He is alert and oriented to person, place, and time  Skin: No rash noted  Psychiatric: He has a normal mood and affect  Vitals reviewed

## 2019-09-05 ENCOUNTER — HOSPITAL ENCOUNTER (OUTPATIENT)
Dept: CT IMAGING | Facility: HOSPITAL | Age: 44
Discharge: HOME | End: 2019-09-05
Payer: COMMERCIAL

## 2019-09-05 ENCOUNTER — APPOINTMENT (OUTPATIENT)
Dept: LAB | Facility: CLINIC | Age: 44
End: 2019-09-05
Payer: COMMERCIAL

## 2019-09-05 ENCOUNTER — OFFICE VISIT (OUTPATIENT)
Dept: FAMILY MEDICINE CLINIC | Facility: CLINIC | Age: 44
End: 2019-09-05
Payer: COMMERCIAL

## 2019-09-05 VITALS
BODY MASS INDEX: 34.55 KG/M2 | HEART RATE: 73 BPM | WEIGHT: 215 LBS | TEMPERATURE: 97.8 F | HEIGHT: 66 IN | SYSTOLIC BLOOD PRESSURE: 120 MMHG | OXYGEN SATURATION: 97 % | DIASTOLIC BLOOD PRESSURE: 70 MMHG

## 2019-09-05 DIAGNOSIS — K57.92 ACUTE DIVERTICULITIS: ICD-10-CM

## 2019-09-05 DIAGNOSIS — K57.92 ACUTE DIVERTICULITIS: Primary | ICD-10-CM

## 2019-09-05 LAB
ALBUMIN SERPL BCP-MCNC: 3.9 G/DL (ref 3.5–5)
ALP SERPL-CCNC: 102 U/L (ref 46–116)
ALT SERPL W P-5'-P-CCNC: 53 U/L (ref 12–78)
ANION GAP SERPL CALCULATED.3IONS-SCNC: 10 MMOL/L (ref 4–13)
AST SERPL W P-5'-P-CCNC: 34 U/L (ref 5–45)
BILIRUB SERPL-MCNC: 0.4 MG/DL (ref 0.2–1)
BUN SERPL-MCNC: 13 MG/DL (ref 5–25)
CALCIUM SERPL-MCNC: 8.9 MG/DL (ref 8.3–10.1)
CHLORIDE SERPL-SCNC: 101 MMOL/L (ref 100–108)
CO2 SERPL-SCNC: 26 MMOL/L (ref 21–32)
CREAT SERPL-MCNC: 1.12 MG/DL (ref 0.6–1.3)
GFR SERPL CREATININE-BSD FRML MDRD: 81 ML/MIN/1.73SQ M
GLUCOSE SERPL-MCNC: 82 MG/DL (ref 65–140)
POTASSIUM SERPL-SCNC: 3.9 MMOL/L (ref 3.5–5.3)
PROT SERPL-MCNC: 7.7 G/DL (ref 6.4–8.2)
SODIUM SERPL-SCNC: 137 MMOL/L (ref 136–145)

## 2019-09-05 PROCEDURE — 74177 CT ABD & PELVIS W/CONTRAST: CPT

## 2019-09-05 PROCEDURE — 3008F BODY MASS INDEX DOCD: CPT | Performed by: FAMILY MEDICINE

## 2019-09-05 PROCEDURE — 99214 OFFICE O/P EST MOD 30 MIN: CPT | Performed by: FAMILY MEDICINE

## 2019-09-05 PROCEDURE — 80053 COMPREHEN METABOLIC PANEL: CPT

## 2019-09-05 PROCEDURE — 36415 COLL VENOUS BLD VENIPUNCTURE: CPT

## 2019-09-05 RX ADMIN — IOHEXOL 100 ML: 350 INJECTION, SOLUTION INTRAVENOUS at 18:01

## 2019-09-05 RX ADMIN — IOHEXOL 50 ML: 240 INJECTION, SOLUTION INTRATHECAL; INTRAVASCULAR; INTRAVENOUS; ORAL at 18:01

## 2019-09-05 NOTE — ASSESSMENT & PLAN NOTE
PATIENT SUGGESTED TO GO FOR A STAT CT SCAN  HE SCHEDULED FOR ONE  AT [facility]  ADVISED TO CONTINUE THE ANTIBIOTICS AS PRESCRIBED  WILL GO TO THE ER IF HE DEVELOPS ANY SIGNS OR SYMPTOMS OF ACUTE ABDOMEN

## 2019-09-05 NOTE — PROGRESS NOTES
Subjective:      Patient ID: Guadalupe Bowser is a 45 y o  male  Abdominal Pain   This is a new problem  The current episode started in the past 7 days  The onset quality is sudden  The problem occurs constantly  The problem has been unchanged  The pain is located in the LLQ  The pain is at a severity of 6/10  The pain is moderate  The quality of the pain is aching and dull  The abdominal pain does not radiate  Associated symptoms include weight loss (2 lbs in 2 days)  Pertinent negatives include no constipation, diarrhea, fever, hematochezia, melena, myalgias, nausea or vomiting  The pain is aggravated by certain positions and eating  Treatments tried: Ciprofloxacin, Flagyl  The treatment provided no relief  Acute diverticulitis  Patient was here 2 days ago for left lower abdomial pain  Patient has a past medical history of acute diverticulitis  He has had 2 episodes of diverticulitis in the past   Patient was started on ciprofloxacin and Flagyl  Patient has been taking the medications for the past 2 days  Does not report any improvement in his symptoms  Denies any symptoms of fever or altered bowel habits  States that he continues to experience discomfort in the left side of the lower abdomen  Patient had a CT scan of the abdomen September 2018 which was positive for diverticulitis  Has undergone colonoscopy April 2019  Past Medical History:   Diagnosis Date    Diverticulitis     Hyperlipemia     Hypertension     Prediabetes     Psoriasis        Family History   Problem Relation Age of Onset    Diabetes Maternal Grandmother     Diabetes Paternal Grandfather        Past Surgical History:   Procedure Laterality Date    DE COLONOSCOPY FLX DX W/COLLJ [address] [facility] [address] WHEN PFRMD N/A 4/3/2019    Procedure: COLONOSCOPY;  Surgeon: Lety Kathleen MD;  [facility]; Service: Colorectal        reports that he has been smoking cigarettes  He has a 50 00 pack-year smoking history   His smokeless tobacco use includes chew  He reports that he does not drink alcohol or use drugs  Current Outpatient Medications:     albuterol (PROVENTIL HFA,VENTOLIN HFA) 90 mcg/act inhaler, Inhale 2 puffs every 6 (six) hours as needed for wheezing or shortness of breath, Disp: 1 Inhaler, Rfl: 0    budesonide-formoterol (SYMBICORT) 80-4 5 MCG/ACT inhaler, Inhale 2 puffs 2 (two) times a day Rinse mouth after use , Disp: 1 Inhaler, Rfl: 2    Cholecalciferol (VITAMIN D) 2000 units CAPS, Take 1 capsule by mouth daily, Disp: , Rfl:     ciprofloxacin (CIPRO) 500 mg tablet, Take 1 tablet (500 mg total) by mouth every 12 (twelve) hours for 10 days, Disp: 20 tablet, Rfl: 0    Hydrocortisone Butyrate 0 1 % CREA, APPLY TO SKIN DAILY, Disp: , Rfl: 3    losartan (COZAAR) 50 mg tablet, Take 1 tablet (50 mg total) by mouth daily, Disp: 90 tablet, Rfl: 0    metroNIDAZOLE (FLAGYL) 500 mg tablet, Take 1 tablet (500 mg total) by mouth every 8 (eight) hours for 10 days, Disp: 30 tablet, Rfl: 0    Multiple Vitamin (MULTIVITAMIN) capsule, Take 1 capsule by mouth daily, Disp: , Rfl:     rosuvastatin (CRESTOR) 5 mg tablet, Take 1 tablet (5 mg total) by mouth daily, Disp: 90 tablet, Rfl: 0    Ustekinumab (STELARA SC), Inject under the skin, Disp: , Rfl:     The following portions of the patient's history were reviewed and updated as appropriate: allergies, current medications, past family history, past medical history, past social history, past surgical history and problem list     Review of Systems   Constitutional: Positive for weight loss (2 lbs in 2 days)  Negative for fever  Respiratory: Negative  Cardiovascular: Negative  Gastrointestinal: Positive for abdominal pain  Negative for constipation, diarrhea, hematochezia, melena, nausea and vomiting  Musculoskeletal: Negative  Negative for myalgias  Neurological: Negative  Psychiatric/Behavioral: Negative              Objective:    /70 (BP Location: Left arm, Patient Position: Sitting, Cuff Size: Large)   Pulse 73   Temp 97 8 °F (36 6 °C)   Ht 5' 6" (1 676 m)   Wt 97 5 kg (215 lb)   SpO2 97%   BMI 34 70 kg/m²      Physical Exam   Constitutional: He is oriented to person, place, and time  He appears well-developed and well-nourished  Cardiovascular: Normal rate, regular rhythm and normal heart sounds  Pulmonary/Chest: Effort normal and breath sounds normal    Abdominal: Soft  Bowel sounds are normal  There is tenderness (LLQ)  Neurological: He is alert and oriented to person, place, and time  Psychiatric: His behavior is normal  Judgment normal          No results found for this or any previous visit (from the past 1008 hour(s))  Assessment/Plan:         Problem List Items Addressed This Visit        Digestive    Acute diverticulitis - Primary     PATIENT SUGGESTED TO GO FOR A STAT CT SCAN  HE SCHEDULED FOR ONE  AT [place]  ADVISED TO CONTINUE THE ANTIBIOTICS AS PRESCRIBED  WILL GO TO THE ER IF HE DEVELOPS ANY SIGNS OR SYMPTOMS OF ACUTE ABDOMEN           Relevant Orders    CT abdomen pelvis w contrast    Comprehensive metabolic panel

## 2019-10-07 NOTE — PATIENT INSTRUCTIONS
Obesity   AMBULATORY CARE:   Obesity  is when your body mass index (BMI) is greater than 30  Your healthcare provider will use your height and weight to measure your BMI  The risks of obesity include  many health problems, such as injuries or physical disability  You may need tests to check for the following:  · Diabetes     · High blood pressure or high cholesterol     · Heart disease     · Gallbladder or liver disease     · Cancer of the colon, breast, prostate, liver, or kidney     · Sleep apnea     · Arthritis or gout  Seek care immediately if:   · You have a severe headache, confusion, or difficulty speaking  · You have weakness on one side of your body  · You have chest pain, sweating, or shortness of breath  Contact your healthcare provider if:   · You have symptoms of gallbladder or liver disease, such as pain in your upper abdomen  · You have knee or hip pain and discomfort while walking  · You have symptoms of diabetes, such as intense hunger and thirst, and frequent urination  · You have symptoms of sleep apnea, such as snoring or daytime sleepiness  · You have questions or concerns about your condition or care  Treatment for obesity  focuses on helping you lose weight to improve your health  Even a small decrease in BMI can reduce the risk for many health problems  Your healthcare provider will help you set a weight-loss goal   · Lifestyle changes  are the first step in treating obesity  These include making healthy food choices and getting regular physical activity  Your healthcare provider may suggest a weight-loss program that involves coaching, education, and therapy  · Medicine  may help you lose weight when it is used with a healthy diet and physical activity  · Surgery  can help you lose weight if you are very obese and have other health problems  There are several types of weight-loss surgery  Ask your healthcare provider for more information    Be successful losing weight:   · Set small, realistic goals  An example of a small goal is to walk for 20 minutes 5 days a week  Anther goal is to lose 5% of your body weight  · Tell friends, family members, and coworkers about your goals  and ask for their support  Ask a friend to lose weight with you, or join a weight-loss support group  · Identify foods or triggers that may cause you to overeat , and find ways to avoid them  Remove tempting high-calorie foods from your home and workplace  Place a bowl of fresh fruit on your kitchen counter  If stress causes you to eat, then find other ways to cope with stress  · Keep a diary to track what you eat and drink  Also write down how many minutes of physical activity you do each day  Weigh yourself once a week and record it in your diary  Eating changes: You will need to eat 500 to 1,000 fewer calories each day than you currently eat to lose 1 to 2 pounds a week  The following changes will help you cut calories:  · Eat smaller portions  Use small plates, no larger than 9 inches in diameter  Fill your plate half full of fruits and vegetables  Measure your food using measuring cups until you know what a serving size looks like  · Eat 3 meals and 1 or 2 snacks each day  Plan your meals in advance  [employer] [employer] and eat at home most of the time  Eat slowly  · Eat fruits and vegetables at every meal   They are low in calories and high in fiber, which makes you feel full  Do not add butter, margarine, or cream sauce to vegetables  Use herbs to season steamed vegetables  · Eat less fat and fewer fried foods  Eat more baked or grilled chicken and fish  These protein sources are lower in calories and fat than red meat  Limit fast food  Dress your salads with olive oil and vinegar instead of bottled dressing  · Limit the amount of sugar you eat  Do not drink sugary beverages  Limit alcohol  Activity changes:  Physical activity is good for your body in many ways   It helps you burn calories and build strong muscles  It decreases stress and depression, and improves your mood  It can also help you sleep better  Talk to your healthcare provider before you begin an exercise program   · Exercise for at least 30 minutes 5 days a week  Start slowly  Set aside time each day for physical activity that you enjoy and that is convenient for you  It is best to do both weight training and an activity that increases your heart rate, such as walking, bicycling, or swimming  · Find ways to be more active  Do yard work and housecleaning  Walk up the stairs instead of using elevators  Spend your leisure time going to events that require walking, such as outdoor festivals or fairs  This extra physical activity can help you lose weight and keep it off  Follow up with your healthcare provider as directed: You may need to meet with a dietitian  Write down your questions so you remember to ask them during your visits  © 2017 [employer] Information is for End User's use only and may not be sold, redistributed or otherwise used for commercial purposes  All illustrations and images included in CareNotes® are the copyrighted property of [employer]  or [employer]  The above information is an  only  It is not intended as medical advice for individual conditions or treatments  Talk to your doctor, nurse or pharmacist before following any medical regimen to see if it is safe and effective for you  no

## 2019-10-10 ENCOUNTER — OFFICE VISIT (OUTPATIENT)
Dept: FAMILY MEDICINE CLINIC | Facility: CLINIC | Age: 44
End: 2019-10-10
Payer: COMMERCIAL

## 2019-10-10 VITALS
BODY MASS INDEX: 35.52 KG/M2 | RESPIRATION RATE: 16 BRPM | DIASTOLIC BLOOD PRESSURE: 80 MMHG | HEART RATE: 84 BPM | SYSTOLIC BLOOD PRESSURE: 120 MMHG | OXYGEN SATURATION: 97 % | WEIGHT: 221 LBS | HEIGHT: 66 IN

## 2019-10-10 DIAGNOSIS — Z23 NEED FOR PROPHYLACTIC VACCINATION AND INOCULATION AGAINST INFLUENZA: Primary | ICD-10-CM

## 2019-10-10 DIAGNOSIS — L40.9 PSORIASIS: ICD-10-CM

## 2019-10-10 DIAGNOSIS — J41.0 SIMPLE CHRONIC BRONCHITIS (HCC): ICD-10-CM

## 2019-10-10 DIAGNOSIS — I10 ESSENTIAL HYPERTENSION: ICD-10-CM

## 2019-10-10 DIAGNOSIS — E78.5 HYPERLIPIDEMIA, UNSPECIFIED HYPERLIPIDEMIA TYPE: ICD-10-CM

## 2019-10-10 DIAGNOSIS — F17.200 SMOKER: ICD-10-CM

## 2019-10-10 PROBLEM — K57.92 ACUTE DIVERTICULITIS: Status: RESOLVED | Noted: 2019-09-03 | Resolved: 2019-10-10

## 2019-10-10 PROCEDURE — 3074F SYST BP LT 130 MM HG: CPT | Performed by: FAMILY MEDICINE

## 2019-10-10 PROCEDURE — 99214 OFFICE O/P EST MOD 30 MIN: CPT | Performed by: FAMILY MEDICINE

## 2019-10-10 PROCEDURE — 90471 IMMUNIZATION ADMIN: CPT | Performed by: FAMILY MEDICINE

## 2019-10-10 PROCEDURE — 3079F DIAST BP 80-89 MM HG: CPT | Performed by: FAMILY MEDICINE

## 2019-10-10 PROCEDURE — 90682 RIV4 VACC RECOMBINANT DNA IM: CPT | Performed by: FAMILY MEDICINE

## 2019-10-10 RX ORDER — ROSUVASTATIN CALCIUM 5 MG/1
5 TABLET, COATED ORAL DAILY
Qty: 90 TABLET | Refills: 0 | Status: SHIPPED | OUTPATIENT
Start: 2019-10-10 | End: 2019-12-13 | Stop reason: SDUPTHER

## 2019-10-10 RX ORDER — LOSARTAN POTASSIUM 50 MG/1
50 TABLET ORAL DAILY
Qty: 90 TABLET | Refills: 0 | Status: SHIPPED | OUTPATIENT
Start: 2019-10-10 | End: 2019-12-13 | Stop reason: SDUPTHER

## 2019-10-10 NOTE — ASSESSMENT & PLAN NOTE
Patient is currently smoking but wants to quit  Educate on the negative effects of Tobacco   Recommend quitting smoking  Listed cessation options,  Including smoking cessation program    Patient wants to  Make an effort on his own by reducing the number of cigarettes  If he is unable to do so then he will follow up again after 3 months to discuss treatment options

## 2019-10-10 NOTE — ASSESSMENT & PLAN NOTE
Stable on Crestor 5 milligram   Continue same  He is due for metabolic labs and follow up after 3 months

## 2019-10-10 NOTE — ASSESSMENT & PLAN NOTE
Symptoms have improved significantly on Stelara, however patient is reporting increased episodes of acute diverticulitis since he has been on the medication  Patient advised to discuss with Dr Ed Coe at his next appointment, about other treatment options

## 2019-10-10 NOTE — ASSESSMENT & PLAN NOTE
Stable on Symbicort, intermittent use of albuterol  Patient encouraged to quit smoking    Smoking cessation program information provided to the patient

## 2019-10-10 NOTE — PROGRESS NOTES
Subjective:      Patient ID: Jerrica Lee is a 45 y o  male  Hypertension   This is a chronic problem  The current episode started more than 1 year ago  The problem is controlled  Pertinent negatives include no chest pain, headaches, palpitations, peripheral edema or shortness of breath  Risk factors for coronary artery disease include dyslipidemia, male gender, smoking/tobacco exposure and obesity  Treatments tried: Losartan 50 milligram daily  The current treatment provides significant improvement  There are no compliance problems  Hyperlipidemia   This is a chronic problem  The current episode started more than 1 year ago  The problem is controlled  Recent lipid tests were reviewed and are normal  Pertinent negatives include no chest pain, leg pain, myalgias or shortness of breath  Current antihyperlipidemic treatment includes statins  The current treatment provides significant improvement of lipids  Compliance problems include adherence to diet  Risk factors for coronary artery disease include dyslipidemia, hypertension, male sex and obesity  Cough   This is a chronic problem  The current episode started more than 1 year ago  The problem has been unchanged  The cough is non-productive  Pertinent negatives include no chest pain, headaches, myalgias, nasal congestion, sore throat, shortness of breath or wheezing  Risk factors for lung disease include smoking/tobacco exposure  Treatments tried: Symbicort, albuterol- infrequent use  The treatment provided significant relief  His past medical history is significant for COPD  The patient has a history of psoriasis, follows up with Dr Madhavi Gibson, is on Stelara, which she gets every 3 months  Patient states that  he has had 2 episodes of acute diverticulitis since he has been on Stelara  Patient says that he has been overeating recently, and is noticing abdominal distention  Denies any pain abdomen/ blood in stools     Says that he knows that he needs to loose weight and stop smoking  He currently smokes 2 packs per day  States that he is getting  next week and that he can try quitting smoking after that  Past Medical History:   Diagnosis Date    Diverticulitis     Hyperlipemia     Hypertension     Prediabetes     Psoriasis        Family History   Problem Relation Age of Onset    Diabetes Maternal Grandmother     Diabetes Paternal Grandfather        Past Surgical History:   Procedure Laterality Date    [facility] COLONOSCOPY FLX DX W/COLLJ [facility] WHEN PFRMD N/A 4/3/2019    Procedure: COLONOSCOPY;  Surgeon: Carlos Riggs MD;  Location[facility]; Service: Colorectal        reports that he has been smoking cigarettes  He has a 50 00 pack-year smoking history  His smokeless tobacco use includes chew  He reports that he does not drink alcohol or use drugs        Current Outpatient Medications:     albuterol (PROVENTIL HFA,VENTOLIN HFA) 90 mcg/act inhaler, Inhale 2 puffs every 6 (six) hours as needed for wheezing or shortness of breath, Disp: 1 Inhaler, Rfl: 0    budesonide-formoterol (SYMBICORT) 80-4 5 MCG/ACT inhaler, Inhale 2 puffs 2 (two) times a day Rinse mouth after use , Disp: 1 Inhaler, Rfl: 2    Cholecalciferol (VITAMIN D) 2000 units CAPS, Take 1 capsule by mouth daily, Disp: , Rfl:     Hydrocortisone Butyrate 0 1 % CREA, APPLY TO SKIN DAILY, Disp: , Rfl: 3    losartan (COZAAR) 50 mg tablet, Take 1 tablet (50 mg total) by mouth daily, Disp: 90 tablet, Rfl: 0    Multiple Vitamin (MULTIVITAMIN) capsule, Take 1 capsule by mouth daily, Disp: , Rfl:     rosuvastatin (CRESTOR) 5 mg tablet, Take 1 tablet (5 mg total) by mouth daily, Disp: 90 tablet, Rfl: 0    Ustekinumab (STELARA SC), Inject under the skin, Disp: , Rfl:     The following portions of the patient's history were reviewed and updated as appropriate: allergies, current medications, past family history, past medical history, past social history, past surgical history and problem list     Review of Systems   Constitutional: Negative  HENT: Negative for sore throat  Respiratory: Positive for cough  Negative for shortness of breath and wheezing  Cardiovascular: Negative  Negative for chest pain and palpitations  Gastrointestinal: Negative  Musculoskeletal: Negative  Negative for myalgias  Neurological: Negative  Negative for headaches  Psychiatric/Behavioral: Negative  Objective:    /80   Pulse 84   Resp 16   Ht 5' 6" (1 676 m)   Wt 100 kg (221 lb)   SpO2 97%   BMI 35 67 kg/m²      Physical Exam   Constitutional: He is oriented to person, place, and time  He appears well-developed and well-nourished  Cardiovascular: Normal rate, regular rhythm and normal heart sounds  Pulmonary/Chest: Effort normal and breath sounds normal    Abdominal: Soft  Bowel sounds are normal  There is no tenderness  Neurological: He is alert and oriented to person, place, and time  Psychiatric: His behavior is normal  Judgment normal          Recent Results (from the past 1008 hour(s))   Comprehensive metabolic panel    Collection Time: 09/05/19  3:53 PM   Result Value Ref Range    Sodium 137 136 - 145 mmol/L    Potassium 3 9 3 5 - 5 3 mmol/L    Chloride 101 100 - 108 mmol/L    CO2 26 21 - 32 mmol/L    ANION GAP 10 4 - 13 mmol/L    BUN 13 5 - 25 mg/dL    Creatinine 1 12 0 60 - 1 30 mg/dL    Glucose 82 65 - 140 mg/dL    Calcium 8 9 8 3 - 10 1 mg/dL    AST 34 5 - 45 U/L    ALT 53 12 - 78 U/L    Alkaline Phosphatase 102 46 - 116 U/L    Total Protein 7 7 6 4 - 8 2 g/dL    Albumin 3 9 3 5 - 5 0 g/dL    Total Bilirubin 0 40 0 20 - 1 00 mg/dL    eGFR 81 ml/min/1 73sq m       Assessment/Plan:         Problem List Items Addressed This Visit        Cardiovascular and Mediastinum    Essential hypertension     Blood pressure is stable on losartan 50 milligram, continue same           Relevant Medications    losartan (COZAAR) 50 mg tablet       Musculoskeletal and Integument Psoriasis     Symptoms have improved significantly on Stelara, however patient is reporting increased episodes of acute diverticulitis since he has been on the medication  Patient advised to discuss with Dr Marylou Hirsch at his next appointment, about other treatment options  Other    Smoker      Patient is currently smoking but wants to quit  Educate on the negative effects of Tobacco   Recommend quitting smoking  Listed cessation options,  Including smoking cessation program    Patient wants to  Make an effort on his own by reducing the number of cigarettes  If he is unable to do so then he will follow up again after 3 months to discuss treatment options  Hyperlipidemia     Stable on Crestor 5 milligram   Continue same  He is due for metabolic labs and follow up after 3 months  Relevant Medications    rosuvastatin (CRESTOR) 5 mg tablet      Other Visit Diagnoses     Need for prophylactic vaccination and inoculation against influenza    -  Primary    Relevant Orders    influenza vaccine, 2016-8128, quadrivalent, recombinant, PF, 0 5 mL, for patients 18 yr+ (FLUBLOK) (Completed)        COPD- Stable on Symbicort, intermittent use of albuterol  Patient encouraged to quit smoking    Smoking cessation program information provided to the patient

## 2019-12-02 ENCOUNTER — TELEPHONE (OUTPATIENT)
Dept: FAMILY MEDICINE CLINIC | Facility: CLINIC | Age: 44
End: 2019-12-02

## 2019-12-02 ENCOUNTER — OFFICE VISIT (OUTPATIENT)
Dept: FAMILY MEDICINE CLINIC | Facility: CLINIC | Age: 44
End: 2019-12-02
Payer: COMMERCIAL

## 2019-12-02 VITALS
OXYGEN SATURATION: 96 % | SYSTOLIC BLOOD PRESSURE: 152 MMHG | BODY MASS INDEX: 35.84 KG/M2 | TEMPERATURE: 97.8 F | WEIGHT: 223 LBS | HEART RATE: 79 BPM | DIASTOLIC BLOOD PRESSURE: 100 MMHG | HEIGHT: 66 IN

## 2019-12-02 DIAGNOSIS — K57.92 ACUTE DIVERTICULITIS: Primary | ICD-10-CM

## 2019-12-02 DIAGNOSIS — I10 ESSENTIAL HYPERTENSION: ICD-10-CM

## 2019-12-02 PROBLEM — J41.0 SIMPLE CHRONIC BRONCHITIS (HCC): Status: RESOLVED | Noted: 2019-07-09 | Resolved: 2019-12-02

## 2019-12-02 PROCEDURE — 99214 OFFICE O/P EST MOD 30 MIN: CPT | Performed by: FAMILY MEDICINE

## 2019-12-02 PROCEDURE — 3008F BODY MASS INDEX DOCD: CPT | Performed by: FAMILY MEDICINE

## 2019-12-02 RX ORDER — METRONIDAZOLE 500 MG/1
500 TABLET ORAL EVERY 8 HOURS SCHEDULED
Qty: 30 TABLET | Refills: 0 | Status: SHIPPED | OUTPATIENT
Start: 2019-12-02 | End: 2019-12-12

## 2019-12-02 RX ORDER — CIPROFLOXACIN 500 MG/1
500 TABLET, FILM COATED ORAL EVERY 12 HOURS SCHEDULED
Qty: 20 TABLET | Refills: 0 | Status: SHIPPED | OUTPATIENT
Start: 2019-12-02 | End: 2019-12-12

## 2019-12-02 NOTE — ASSESSMENT & PLAN NOTE
Patient started on ciprofloxacin and Flagyl for 10 days  Advised to contact his GI as well ASAP    Advised to go to the ER if he develops any red flag symptoms

## 2019-12-02 NOTE — TELEPHONE ENCOUNTER
Patient called and stated that he is having a flare up of diverticulitis  You don't have openings and he stated that he was just treated in September for it as well  Are you able to send cipro and flagyl to the pharmacy for him? I advised that I would send the message

## 2019-12-02 NOTE — ASSESSMENT & PLAN NOTE
Patient's blood pressure is elevated today  Patient states that he usually monitors his blood pressure at home and most of the values are less than 130/80  He is on losartan 50 milligram   Patient wants to monitor his blood pressure  He has a follow-up scheduled after 2 weeks

## 2019-12-02 NOTE — PROGRESS NOTES
Subjective:      Patient ID: Naheed Hayes is a 45 y o  male  Abdominal Pain   This is a recurrent problem  The current episode started yesterday  The onset quality is sudden  The problem occurs constantly  The problem has been unchanged  The pain is located in the LLQ  The pain is at a severity of 6/10  The pain is moderate  The quality of the pain is dull and aching  The abdominal pain does not radiate  Associated symptoms include anorexia  Pertinent negatives include no belching, constipation, diarrhea, dysuria, fever, hematochezia, hematuria, melena, myalgias, nausea or vomiting  The pain is aggravated by palpation, certain positions, coughing and movement  The pain is relieved by nothing  He has tried nothing for the symptoms  Recurrent diverticulitis    Patient states that he had Flagyl and Cipro from the previous prescription which he took last week for 4 days  His symptoms improved thereafter however since yesterday he is experiencing sharp pain in his lower abdomen which is similar to his previous diverticulitis episodes  Patient states that he had cheese steak with Jalapeno, which could have been the trigger? Past Medical History:   Diagnosis Date    Diverticulitis     Hyperlipemia     Hypertension     Prediabetes     Psoriasis        Family History   Problem Relation Age of Onset    Diabetes Maternal Grandmother     Diabetes Paternal Grandfather        Past Surgical History:   Procedure Laterality Date    MD COLONOSCOPY FLX DX W/COLLJ [facility] WHEN PFRMD N/A 4/3/2019    Procedure: COLONOSCOPY;  Surgeon: Hayley Moreno MD;  [facility]; Service: Colorectal        reports that he has been smoking cigarettes  He has a 50 00 pack-year smoking history  His smokeless tobacco use includes chew  He reports that he does not drink alcohol or use drugs        Current Outpatient Medications:     albuterol (PROVENTIL HFA,VENTOLIN HFA) 90 mcg/act inhaler, Inhale 2 puffs every 6 (six) hours as needed for wheezing or shortness of breath, Disp: 1 Inhaler, Rfl: 0    budesonide-formoterol (SYMBICORT) 80-4 5 MCG/ACT inhaler, Inhale 2 puffs 2 (two) times a day Rinse mouth after use , Disp: 1 Inhaler, Rfl: 2    Cholecalciferol (VITAMIN D) 2000 units CAPS, Take 1 capsule by mouth daily, Disp: , Rfl:     Hydrocortisone Butyrate 0 1 % CREA, APPLY TO SKIN DAILY, Disp: , Rfl: 3    losartan (COZAAR) 50 mg tablet, Take 1 tablet (50 mg total) by mouth daily, Disp: 90 tablet, Rfl: 0    Multiple Vitamin (MULTIVITAMIN) capsule, Take 1 capsule by mouth daily, Disp: , Rfl:     rosuvastatin (CRESTOR) 5 mg tablet, Take 1 tablet (5 mg total) by mouth daily, Disp: 90 tablet, Rfl: 0    Ustekinumab (STELARA SC), Inject under the skin, Disp: , Rfl:     ciprofloxacin (CIPRO) 500 mg tablet, Take 1 tablet (500 mg total) by mouth every 12 (twelve) hours for 10 days, Disp: 20 tablet, Rfl: 0    metroNIDAZOLE (FLAGYL) 500 mg tablet, Take 1 tablet (500 mg total) by mouth every 8 (eight) hours for 10 days, Disp: 30 tablet, Rfl: 0    The following portions of the patient's history were reviewed and updated as appropriate: allergies, current medications, past family history, past medical history, past social history, past surgical history and problem list     Review of Systems   Constitutional: Negative  Negative for fever  Respiratory: Negative  Cardiovascular: Negative  Gastrointestinal: Positive for abdominal pain and anorexia  Negative for constipation, diarrhea, hematochezia, melena, nausea and vomiting  Endocrine: Negative  Genitourinary: Negative  Negative for dysuria and hematuria  Musculoskeletal: Negative  Negative for myalgias  Allergic/Immunologic: Negative  Neurological: Negative  Psychiatric/Behavioral: Negative              Objective:    /100 (BP Location: Left arm, Patient Position: Sitting, Cuff Size: Large)   Pulse 79   Temp 97 8 °F (36 6 °C)   Ht 5' 6" (1 676 m)   Wt 101 kg (223 lb)   SpO2 96%   BMI 35 99 kg/m²      Physical Exam   Constitutional: He is oriented to person, place, and time  He appears well-developed and well-nourished  HENT:   Mouth/Throat: Oropharynx is clear and moist  No oropharyngeal exudate  Eyes: Pupils are equal, round, and reactive to light  Neck: Normal range of motion  Cardiovascular: Normal rate and regular rhythm  Pulmonary/Chest: Effort normal and breath sounds normal    Abdominal: Soft  Bowel sounds are normal  There is tenderness in the left lower quadrant  Musculoskeletal: Normal range of motion  Neurological: He is alert and oriented to person, place, and time  Psychiatric: His behavior is normal  Judgment normal          No results found for this or any previous visit (from the past 1008 hour(s))  Assessment/Plan:             Problem List Items Addressed This Visit        Digestive    RESOLVED: Acute diverticulitis - Primary     Patient started on ciprofloxacin and Flagyl for 10 days  Advised to contact his GI as well ASAP  Advised to go to the ER if he develops any red flag symptoms         Relevant Medications    ciprofloxacin (CIPRO) 500 mg tablet    metroNIDAZOLE (FLAGYL) 500 mg tablet    Other Relevant Orders    CBC and differential    Comprehensive metabolic panel       Cardiovascular and Mediastinum    Essential hypertension     Patient's blood pressure is elevated today  Patient states that he usually monitors his blood pressure at home and most of the values are less than 130/80  He is on losartan 50 milligram   Patient wants to monitor his blood pressure  He has a follow-up scheduled after 2 weeks

## 2019-12-10 LAB
ALBUMIN SERPL-MCNC: 4.3 G/DL (ref 3.6–5.1)
ALBUMIN/CREAT UR: 3 MCG/MG CREAT
ALBUMIN/GLOB SERPL: 1.6 (CALC) (ref 1–2.5)
ALP SERPL-CCNC: 72 U/L (ref 40–115)
ALT SERPL-CCNC: 36 U/L (ref 9–46)
AST SERPL-CCNC: 20 U/L (ref 10–40)
BILIRUB SERPL-MCNC: 0.3 MG/DL (ref 0.2–1.2)
BUN SERPL-MCNC: 12 MG/DL (ref 7–25)
BUN/CREAT SERPL: ABNORMAL (CALC) (ref 6–22)
CALCIUM SERPL-MCNC: 9.4 MG/DL (ref 8.6–10.3)
CHLORIDE SERPL-SCNC: 103 MMOL/L (ref 98–110)
CHOLEST SERPL-MCNC: 133 MG/DL
CHOLEST/HDLC SERPL: 4.2 (CALC)
CO2 SERPL-SCNC: 28 MMOL/L (ref 20–32)
CREAT SERPL-MCNC: 1.07 MG/DL (ref 0.6–1.35)
CREAT UR-MCNC: 141 MG/DL (ref 20–320)
GLOBULIN SER CALC-MCNC: 2.7 G/DL (CALC) (ref 1.9–3.7)
GLUCOSE SERPL-MCNC: 105 MG/DL (ref 65–99)
HBA1C MFR BLD: 5.9 % OF TOTAL HGB
HDLC SERPL-MCNC: 32 MG/DL
LDLC SERPL CALC-MCNC: 80 MG/DL (CALC)
MICROALBUMIN UR-MCNC: 0.4 MG/DL
NONHDLC SERPL-MCNC: 101 MG/DL (CALC)
POTASSIUM SERPL-SCNC: 4.6 MMOL/L (ref 3.5–5.3)
PROT SERPL-MCNC: 7 G/DL (ref 6.1–8.1)
SL AMB EGFR AFRICAN AMERICAN: 99 ML/MIN/1.73M2
SL AMB EGFR NON AFRICAN AMERICAN: 85 ML/MIN/1.73M2
SODIUM SERPL-SCNC: 139 MMOL/L (ref 135–146)
TRIGL SERPL-MCNC: 117 MG/DL

## 2019-12-13 ENCOUNTER — OFFICE VISIT (OUTPATIENT)
Dept: FAMILY MEDICINE CLINIC | Facility: CLINIC | Age: 44
End: 2019-12-13
Payer: COMMERCIAL

## 2019-12-13 VITALS
WEIGHT: 219 LBS | DIASTOLIC BLOOD PRESSURE: 80 MMHG | HEIGHT: 68 IN | OXYGEN SATURATION: 96 % | HEART RATE: 74 BPM | SYSTOLIC BLOOD PRESSURE: 120 MMHG | BODY MASS INDEX: 33.19 KG/M2

## 2019-12-13 DIAGNOSIS — R73.03 PREDIABETES: Primary | ICD-10-CM

## 2019-12-13 DIAGNOSIS — R20.0 NUMBNESS OF ARM: ICD-10-CM

## 2019-12-13 DIAGNOSIS — F17.200 SMOKER: ICD-10-CM

## 2019-12-13 DIAGNOSIS — J40 BRONCHITIS: ICD-10-CM

## 2019-12-13 DIAGNOSIS — J45.41 MODERATE PERSISTENT ASTHMA WITH ACUTE EXACERBATION: ICD-10-CM

## 2019-12-13 DIAGNOSIS — I10 ESSENTIAL HYPERTENSION: ICD-10-CM

## 2019-12-13 DIAGNOSIS — E78.5 HYPERLIPIDEMIA, UNSPECIFIED HYPERLIPIDEMIA TYPE: ICD-10-CM

## 2019-12-13 PROCEDURE — 99214 OFFICE O/P EST MOD 30 MIN: CPT | Performed by: FAMILY MEDICINE

## 2019-12-13 PROCEDURE — 3079F DIAST BP 80-89 MM HG: CPT | Performed by: FAMILY MEDICINE

## 2019-12-13 PROCEDURE — 4004F PT TOBACCO SCREEN RCVD TLK: CPT | Performed by: FAMILY MEDICINE

## 2019-12-13 PROCEDURE — 3074F SYST BP LT 130 MM HG: CPT | Performed by: FAMILY MEDICINE

## 2019-12-13 RX ORDER — LOSARTAN POTASSIUM 50 MG/1
50 TABLET ORAL DAILY
Qty: 90 TABLET | Refills: 1 | Status: SHIPPED | OUTPATIENT
Start: 2019-12-13 | End: 2020-12-16 | Stop reason: SDUPTHER

## 2019-12-13 RX ORDER — ROSUVASTATIN CALCIUM 5 MG/1
5 TABLET, COATED ORAL DAILY
Qty: 90 TABLET | Refills: 1 | Status: SHIPPED | OUTPATIENT
Start: 2019-12-13 | End: 2020-12-16 | Stop reason: SDUPTHER

## 2019-12-13 RX ORDER — BUDESONIDE AND FORMOTEROL FUMARATE DIHYDRATE 80; 4.5 UG/1; UG/1
2 AEROSOL RESPIRATORY (INHALATION) 2 TIMES DAILY
Qty: 1 INHALER | Refills: 2 | Status: SHIPPED | OUTPATIENT
Start: 2019-12-13

## 2019-12-13 RX ORDER — ALBUTEROL SULFATE 90 UG/1
2 AEROSOL, METERED RESPIRATORY (INHALATION) EVERY 6 HOURS PRN
Qty: 1 INHALER | Refills: 0 | Status: SHIPPED | OUTPATIENT
Start: 2019-12-13

## 2019-12-16 ENCOUNTER — APPOINTMENT (OUTPATIENT)
Dept: RADIOLOGY | Facility: CLINIC | Age: 44
End: 2019-12-16
Payer: COMMERCIAL

## 2019-12-16 DIAGNOSIS — R20.0 NUMBNESS OF ARM: ICD-10-CM

## 2019-12-16 PROCEDURE — 72050 X-RAY EXAM NECK SPINE 4/5VWS: CPT

## 2019-12-23 ENCOUNTER — OFFICE VISIT (OUTPATIENT)
Dept: OBGYN CLINIC | Facility: MEDICAL CENTER | Age: 44
End: 2019-12-23
Payer: COMMERCIAL

## 2019-12-23 VITALS
BODY MASS INDEX: 33.19 KG/M2 | DIASTOLIC BLOOD PRESSURE: 83 MMHG | WEIGHT: 219 LBS | SYSTOLIC BLOOD PRESSURE: 147 MMHG | HEIGHT: 68 IN | HEART RATE: 86 BPM

## 2019-12-23 DIAGNOSIS — M77.11 LATERAL EPICONDYLITIS OF BOTH ELBOWS: Primary | ICD-10-CM

## 2019-12-23 DIAGNOSIS — M77.12 LATERAL EPICONDYLITIS OF BOTH ELBOWS: Primary | ICD-10-CM

## 2019-12-23 PROCEDURE — 20551 NJX 1 TENDON ORIGIN/INSJ: CPT | Performed by: ORTHOPAEDIC SURGERY

## 2019-12-23 PROCEDURE — 99213 OFFICE O/P EST LOW 20 MIN: CPT | Performed by: ORTHOPAEDIC SURGERY

## 2019-12-23 RX ORDER — LIDOCAINE HYDROCHLORIDE 10 MG/ML
1 INJECTION, SOLUTION INFILTRATION; PERINEURAL
Status: COMPLETED | OUTPATIENT
Start: 2019-12-23 | End: 2019-12-23

## 2019-12-23 RX ORDER — TRIAMCINOLONE ACETONIDE 40 MG/ML
40 INJECTION, SUSPENSION INTRA-ARTICULAR; INTRAMUSCULAR
Status: COMPLETED | OUTPATIENT
Start: 2019-12-23 | End: 2019-12-23

## 2019-12-23 RX ADMIN — TRIAMCINOLONE ACETONIDE 40 MG: 40 INJECTION, SUSPENSION INTRA-ARTICULAR; INTRAMUSCULAR at 14:10

## 2019-12-23 RX ADMIN — LIDOCAINE HYDROCHLORIDE 1 ML: 10 INJECTION, SOLUTION INFILTRATION; PERINEURAL at 14:10

## 2019-12-23 NOTE — PROGRESS NOTES
CHIEF COMPLAINT:  Chief Complaint   Patient presents with    Left Elbow - Follow-up    Right Elbow - Follow-up       SUBJECTIVE:  Jerrica Lee is a 45y o  year old male who presents to the office due to left greater than right lateral elbow pain  Patient was previously seen in the office 1/28/19 following a the bilateral cortisone steroid injection for lateral epicondylitis administered 01/07/2019 which provided significant relief  Pt states that  The pain returned about 2 months ago  Pt states that he has not been doing the exercises that were previously provided  PAST MEDICAL HISTORY:  Past Medical History:   Diagnosis Date    Diverticulitis     Hyperlipemia     Hypertension     Prediabetes     Psoriasis        PAST SURGICAL HISTORY:  Past Surgical History:   Procedure Laterality Date    MT COLONOSCOPY FLX DX W/COLLJ SPEC WHEN PFRMD N/A 4/3/2019    Procedure: COLONOSCOPY;  Surgeon: Eileen Gilliam MD;  Location: [facility];   Service: Colorectal       FAMILY HISTORY:  Family History   Problem Relation Age of Onset    Diabetes Maternal Grandmother     Diabetes Paternal Grandfather        SOCIAL HISTORY:  Social History     Tobacco Use    Smoking status: Heavy Tobacco Smoker     Packs/day: 2 00     Years: 25 00     Pack years: 50 00     Types: Cigarettes    Smokeless tobacco: Current User     Types: Chew   Substance Use Topics    Alcohol use: Never     Frequency: Monthly or less    Drug use: No       MEDICATIONS:    Current Outpatient Medications:     albuterol (PROVENTIL HFA,VENTOLIN HFA) 90 mcg/act inhaler, Inhale 2 puffs every 6 (six) hours as needed for wheezing or shortness of breath, Disp: 1 Inhaler, Rfl: 0    budesonide-formoterol (SYMBICORT) 80-4 5 MCG/ACT inhaler, Inhale 2 puffs 2 (two) times a day Rinse mouth after use , Disp: 1 Inhaler, Rfl: 2    Cholecalciferol (VITAMIN D) 2000 units CAPS, Take 1 capsule by mouth daily, Disp: , Rfl:     Hydrocortisone Butyrate 0 1 % CREA, APPLY TO SKIN DAILY, Disp: , Rfl: 3    losartan (COZAAR) 50 mg tablet, Take 1 tablet (50 mg total) by mouth daily, Disp: 90 tablet, Rfl: 1    Multiple Vitamin (MULTIVITAMIN) capsule, Take 1 capsule by mouth daily, Disp: , Rfl:     rosuvastatin (CRESTOR) 5 mg tablet, Take 1 tablet (5 mg total) by mouth daily, Disp: 90 tablet, Rfl: 1    Ustekinumab (STELARA SC), Inject under the skin, Disp: , Rfl:     ALLERGIES:  No Known Allergies    REVIEW OF SYSTEMS:  Review of Systems   Constitutional: Negative for chills, fever and unexpected weight change  HENT: Negative for hearing loss, nosebleeds and sore throat  Eyes: Negative for pain, redness and visual disturbance  Respiratory: Negative for cough, shortness of breath and wheezing  Cardiovascular: Negative for chest pain, palpitations and leg swelling  Gastrointestinal: Negative for abdominal pain, nausea and vomiting  Endocrine: Negative for polydipsia and polyuria  Genitourinary: Negative for dysuria and hematuria  Skin: Negative for rash and wound  Neurological: Negative for dizziness, light-headedness and headaches  Psychiatric/Behavioral: Negative for decreased concentration, dysphoric mood and suicidal ideas  The patient is not nervous/anxious          VITALS:  Vitals:    12/23/19 1345   BP: 147/83   Pulse: 86       LABS:  HgA1c:   Lab Results   Component Value Date    HGBA1C 5 9 (H) 12/09/2019     BMP:   Lab Results   Component Value Date    CALCIUM 9 4 12/09/2019    K 4 6 12/09/2019    CO2 28 12/09/2019     12/09/2019    BUN 12 12/09/2019    CREATININE 1 07 12/09/2019       _____________________________________________________  PHYSICAL EXAMINATION:  General: well developed and well nourished, alert, oriented times 3 and appears comfortable  Psychiatric: Normal  HEENT: Trachea Midline, No torticollis  Pulmonary: No audible wheezing or strider  Cardiovascular: No discernable arrhythmia   Skin: No masses, erythema, lacerations, fluctation, ulcerations  Neurovascular: Sensation Intact to the Median, Ulnar, Radial Nerve, Motor Intact to the Median, Ulnar, Radial Nerve and Pulses Intact    MUSCULOSKELETAL EXAMINATION:  Bilateral Elbow:    No swelling or deformity  Full range of motion with flexion, extension, supination and pronation  Positive tenderness to palpation over the Lateral Epicondyle  Pain with passive flexion of the wrist with elbow fully extended  Pain with resisted wrist extension with elbow fully extended     ___________________________________________________  STUDIES REVIEWED:  No studies reviewed  PROCEDURES PERFORMED:  Hand/upper extremity injection: bilateral elbow  Date/Time: 12/23/2019 2:10 PM  Consent given by: patient  Site marked: site marked  Timeout: Immediately prior to procedure a time out was called to verify the correct patient, procedure, equipment, support staff and site/side marked as required   Supporting Documentation  Indications: pain   Procedure Details  Condition:lateral epicondylitis Site: bilateral elbow   Preparation: Patient was prepped and draped in the usual sterile fashion  Needle size: 25 G  Ultrasound guidance: no  Medications (Right): 1 mL lidocaine 1 %; 40 mg triamcinolone acetonide 40 mg/mLMedications (Left): 1 mL lidocaine 1 %; 40 mg triamcinolone acetonide 40 mg/mL   Patient tolerance: patient tolerated the procedure well with no immediate complications  Dressing:  Sterile dressing applied             _____________________________________________________  ASSESSMENT/PLAN:    Bilateral lateral epicondylitis  * Pt was offered and accepted CSI  * Bilateral CSI were administered without complications  * Pt was provided with directions for exercises and advised to do them daily  * Pt was advised to call the office if he has any questions or concerns        Follow Up:  No follow-ups on file        To Do Next Visit:  Re-evaluation of current issue      Felicita Attestation    I,:   Jamin De Leon Amberly am acting as a scribe while in the presence of the attending physician :        I,:   Marly Doan MD personally performed the services described in this documentation    as scribed in my presence :

## 2019-12-26 PROBLEM — K57.32 DIVERTICULITIS LARGE INTESTINE W/O PERFORATION OR ABSCESS W/O BLEEDING: Status: ACTIVE | Noted: 2019-09-03

## 2019-12-30 ENCOUNTER — APPOINTMENT (OUTPATIENT)
Dept: RADIOLOGY | Facility: CLINIC | Age: 44
End: 2019-12-30
Payer: COMMERCIAL

## 2019-12-30 ENCOUNTER — OFFICE VISIT (OUTPATIENT)
Dept: URGENT CARE | Facility: CLINIC | Age: 44
End: 2019-12-30
Payer: COMMERCIAL

## 2019-12-30 VITALS
OXYGEN SATURATION: 97 % | BODY MASS INDEX: 34.62 KG/M2 | SYSTOLIC BLOOD PRESSURE: 167 MMHG | HEIGHT: 66 IN | TEMPERATURE: 98.4 F | RESPIRATION RATE: 16 BRPM | WEIGHT: 215.4 LBS | HEART RATE: 74 BPM | DIASTOLIC BLOOD PRESSURE: 93 MMHG

## 2019-12-30 DIAGNOSIS — M79.89 SWELLING OF RIGHT HAND: Primary | ICD-10-CM

## 2019-12-30 DIAGNOSIS — M79.89 SWELLING OF RIGHT HAND: ICD-10-CM

## 2019-12-30 PROCEDURE — 73130 X-RAY EXAM OF HAND: CPT

## 2019-12-30 PROCEDURE — 99283 EMERGENCY DEPT VISIT LOW MDM: CPT | Performed by: NURSE PRACTITIONER

## 2019-12-30 PROCEDURE — 99203 OFFICE O/P NEW LOW 30 MIN: CPT | Performed by: NURSE PRACTITIONER

## 2019-12-30 PROCEDURE — G0382 LEV 3 HOSP TYPE B ED VISIT: HCPCS | Performed by: NURSE PRACTITIONER

## 2019-12-30 NOTE — PATIENT INSTRUCTIONS
Splint for comfort   Ibuprofen 600mg every 6 hours for pain and swelling   Elevate and apply ice 20 min every 2-3 hours  Follow up with orthopedics at 1100 on 12/30 as scheduled  Swollen Joint   WHAT YOU NEED TO KNOW:   Joint swelling may occur in one or more joints  You may have other symptoms, such as pain, tenderness, or stiffness  A swollen joint may be caused by a variety of conditions such as arthritis, pseudogout, gout, tendinitis, or injury  DISCHARGE INSTRUCTIONS:   Return to the emergency department if:   · You cannot move your joint at all  · You have severe pain that does not get better with medicine  Contact your healthcare provider if:   · You have a fever  · You have redness or warmth over the joint  · The swelling does not decrease with treatment  · You have questions or concerns about your condition or care  Medicines:   · NSAIDs , such as ibuprofen, help decrease swelling, pain, and fever  This medicine is available with or without a doctor's order  NSAIDs can cause stomach bleeding or kidney problems in certain people  If you take blood thinner medicine, always ask your healthcare provider if NSAIDs are safe for you  Always read the medicine label and follow directions  · Take your medicine as directed  Contact your healthcare provider if you think your medicine is not helping or if you have side effects  Tell him of her if you are allergic to any medicine  Keep a list of the medicines, vitamins, and herbs you take  Include the amounts, and when and why you take them  Bring the list or the pill bottles to follow-up visits  Carry your medicine list with you in case of an emergency  Follow up with your healthcare provider as directed:  Write down your questions so you remember to ask them during your visits  Self-care:   · Rest  your swollen joint  Avoid activities that make the swelling or pain worse   You may need to avoid putting weight on your joint while you have pain  Crutches or a walker can be used to avoid putting weight on joints in your lower body  · Apply ice  on your swollen joint for 15 to 20 minutes every hour or as directed  Use an ice pack, or put crushed ice in a plastic bag  Cover it with a towel  Ice helps prevent tissue damage and decreases swelling and pain  · Apply heat  on your swollen joint for 20 to 30 minutes every 2 hours for as many days as directed  Heat helps decrease pain  · Elevate  your swollen joint above the level of your heart as often as you can  This will help decrease swelling and pain  Prop your joint on pillows or blankets to keep it elevated comfortably  © 2017 [facility] Information is for End User's use only and may not be sold, redistributed or otherwise used for commercial purposes  All illustrations and images included in CareNotes® are the copyrighted property of [employer]  or [employer] Feliz  The above information is an  only  It is not intended as medical advice for individual conditions or treatments  Talk to your doctor, nurse or pharmacist before following any medical regimen to see if it is safe and effective for you

## 2019-12-30 NOTE — PROGRESS NOTES
[employer] [facility]        NAME: Reynaldo Chavez is a 45 y o  male  : 1975    MRN: [id number]  DATE: 2019  TIME: 9:38 AM    Assessment and Plan   Swelling of right hand [M79 [id number]]  1  Swelling of right hand  XR hand 3+ vw right     Orthopedic appointment scheduled for today at 11 by  staff  Velcro wrist splint applied for comfort  Patient Instructions   Splint for comfort   Ibuprofen 600mg every 6 hours for pain and swelling   Elevate and apply ice 20 min every 2-3 hours  Follow up with orthopedics at 1100 on  as scheduled  Follow up with PCP in 3-5 days  Proceed to  ER if symptoms worsen  Chief Complaint     Chief Complaint   Patient presents with    Hand Pain     Pt stated he woke up yesterday and his hand was sore as the day went on he c/o of more pain now today he cannot move it-denies any trauma or injury to the hand  History of Present Illness       Patient is a 32-year-old male presenting with right hand pain and swelling for the past 2 days  He states he woke up yesterday with the pain in the base of his thumb  It has worsened since onset  Denies trauma or injury  He did receive a cortisone injection in the right elbow 1 week ago  He took ibuprofen for pain and swelling without improvement  He is right-hand dominant  Review of Systems   Review of Systems   Constitutional: Negative for activity change, chills and fever  Respiratory: Negative for cough and shortness of breath  Gastrointestinal: Negative for abdominal pain, diarrhea, nausea and vomiting  Musculoskeletal: Positive for arthralgias and joint swelling  Skin: Negative for rash  Neurological: Negative for dizziness, weakness and numbness           Current Medications       Current Outpatient Medications:     albuterol (PROVENTIL HFA,VENTOLIN HFA) 90 mcg/act inhaler, Inhale 2 puffs every 6 (six) hours as needed for wheezing or shortness of breath, Disp: 1 Inhaler, Rfl: 0   budesonide-formoterol (SYMBICORT) 80-4 5 MCG/ACT inhaler, Inhale 2 puffs 2 (two) times a day Rinse mouth after use , Disp: 1 Inhaler, Rfl: 2    Cholecalciferol (VITAMIN D) 2000 units CAPS, Take 1 capsule by mouth daily, Disp: , Rfl:     Hydrocortisone Butyrate 0 1 % CREA, APPLY TO SKIN DAILY, Disp: , Rfl: 3    losartan (COZAAR) 50 mg tablet, Take 1 tablet (50 mg total) by mouth daily, Disp: 90 tablet, Rfl: 1    Multiple Vitamin (MULTIVITAMIN) capsule, Take 1 capsule by mouth daily, Disp: , Rfl:     rosuvastatin (CRESTOR) 5 mg tablet, Take 1 tablet (5 mg total) by mouth daily, Disp: 90 tablet, Rfl: 1    Ustekinumab (STELARA SC), Inject under the skin, Disp: , Rfl:     Current Allergies     Allergies as of 12/30/2019    (No Known Allergies)            The following portions of the patient's history were reviewed and updated as appropriate: allergies, current medications, past family history, past medical history, past social history, past surgical history and problem list      Past Medical History:   Diagnosis Date    Diverticulitis     Hyperlipemia     Hypertension     Prediabetes     Psoriasis        Past Surgical History:   Procedure Laterality Date    [place] COLONOSCOPY FLX DX W/COLLJ SPEC WHEN PFRMD N/A 4/3/2019    Procedure: COLONOSCOPY;  Surgeon: Myles Dc MD;  [facility]; Service: Colorectal       Family History   Problem Relation Age of Onset    Diabetes Maternal Grandmother     Diabetes Paternal Grandfather     Colon cancer Neg Hx          Medications have been verified  Objective   /93   Pulse 74   Temp 98 4 °F (36 9 °C)   Resp 16   Ht 5' 6" (1 676 m)   Wt 97 7 kg (215 lb 6 4 oz)   SpO2 97%   BMI 34 77 kg/m²     Right  Hand xray:  Negative for fracture    Physical Exam     Physical Exam   Constitutional: He is oriented to person, place, and time  Vital signs are normal  He appears well-developed and well-nourished  He is active  No distress     HENT: Head: Normocephalic  Right Ear: Hearing normal    Left Ear: Hearing normal    Nose: Nose normal    Cardiovascular: Normal rate, regular rhythm, S1 normal, S2 normal and normal heart sounds  Pulmonary/Chest: Effort normal and breath sounds normal  No respiratory distress  He has no decreased breath sounds  Abdominal: Normal appearance  Musculoskeletal:        Right hand: He exhibits decreased range of motion, tenderness and swelling  Hands:  Generalized swelling of right hand  Neurological: He is alert and oriented to person, place, and time  He is not disoriented  Skin: Skin is warm, dry and intact       Splint application  Date/Time: 12/30/2019 3:32 PM  Performed by: FUNMI Mathews  Authorized by: FUNMI Mathews     Consent:     Consent obtained:  Verbal    Consent given by:  Patient    Alternatives discussed:  No treatment, observation and referral  Pre-procedure details:     Sensation:  Normal  Procedure details:     Laterality:  Right    Location:  Wrist    Wrist:  R wrist    Strapping: no  Cast type:  Short arm    Splint type:  Volar short arm (pre eric velcro static wrist splint )

## 2020-01-02 ENCOUNTER — APPOINTMENT (OUTPATIENT)
Dept: LAB | Facility: CLINIC | Age: 45
End: 2020-01-02
Payer: COMMERCIAL

## 2020-01-02 ENCOUNTER — TRANSCRIBE ORDERS (OUTPATIENT)
Dept: LAB | Facility: CLINIC | Age: 45
End: 2020-01-02

## 2020-01-02 ENCOUNTER — LAB REQUISITION (OUTPATIENT)
Dept: LAB | Facility: HOSPITAL | Age: 45
End: 2020-01-02
Payer: COMMERCIAL

## 2020-01-02 DIAGNOSIS — K57.21 DIVERTICULITIS OF LARGE INTESTINE WITH PERFORATION AND ABSCESS WITH BLEEDING: ICD-10-CM

## 2020-01-02 DIAGNOSIS — K57.32 DIVERTICULITIS LARGE INTESTINE W/O PERFORATION OR ABSCESS W/O BLEEDING: ICD-10-CM

## 2020-01-02 LAB
EST. AVERAGE GLUCOSE BLD GHB EST-MCNC: 134 MG/DL
HBA1C MFR BLD: 6.3 % (ref 4.2–6.3)

## 2020-01-02 PROCEDURE — 86850 RBC ANTIBODY SCREEN: CPT | Performed by: COLON & RECTAL SURGERY

## 2020-01-02 PROCEDURE — 86900 BLOOD TYPING SEROLOGIC ABO: CPT | Performed by: COLON & RECTAL SURGERY

## 2020-01-02 PROCEDURE — 83036 HEMOGLOBIN GLYCOSYLATED A1C: CPT

## 2020-01-02 PROCEDURE — 86901 BLOOD TYPING SEROLOGIC RH(D): CPT | Performed by: COLON & RECTAL SURGERY

## 2020-01-02 PROCEDURE — 36415 COLL VENOUS BLD VENIPUNCTURE: CPT

## 2020-01-02 NOTE — PRE-PROCEDURE INSTRUCTIONS
Pre-Surgery Instructions:   Medication Instructions    albuterol (PROVENTIL HFA,VENTOLIN HFA) 90 mcg/act inhaler Instructed patient per Anesthesia Guidelines   budesonide-formoterol (SYMBICORT) 80-4 5 MCG/ACT inhaler Instructed patient per Anesthesia Guidelines   Cholecalciferol (VITAMIN D) 2000 units CAPS Instructed patient per Anesthesia Guidelines   Hydrocortisone Butyrate 0 1 % CREA Instructed patient per Anesthesia Guidelines   losartan (COZAAR) 50 mg tablet Instructed patient per Anesthesia Guidelines   Multiple Vitamin (MULTIVITAMIN) capsule Instructed patient per Anesthesia Guidelines   rosuvastatin (CRESTOR) 5 mg tablet Instructed patient per Anesthesia Guidelines   Ustekinumab (STELARA SC) Instructed patient per Anesthesia Guidelines  Review with patient via phone medications and showering instructions  Aware bowel prep and antibiotic order day prior surgery  Advice stop vitamins, don't take NSAID'S  prior surgery  Advice don't take losartan morning day of surgery  Say is not taking any medications morning day of surgery  Say is going to Dr Deny Villanueva office today and  to have blood test done today  Advice ASC call and [facility] phone number  Verbalized understanding  ACE/ARB Med Class   Continue this medication up to the evening before surgery/procedure, but do not take the morning of the day of surgery  Inhalational Med Class   Continue to take these inhaler medications on your normal schedule up to and including the day of surgery  Statin Med Class   Continue to take this medication on your normal schedule  If this is an oral medication and you take it in the morning, then you may take this medicine with a sip of water  Vitamin Med Class   You may continue to take any vitamin that your surgeon has prescribed to you up to the day before surgery   If your surgeon has not specifically prescribed this vitamin or instructed you to continue then stop taking 7 days prior to surgery

## 2020-01-02 NOTE — H&P (VIEW-ONLY)
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  We discussed laparoscopic and open approaches with laparoscopic being preferred  Plan will be for surgery next week  All questions were answered  HPI     Daxa Miranda is here today pre op  He is scheduled for surgery on 1/8/20 for a sigmoid colectomy for diverticulitis  He reports the day after his last office visit he had another episode of diverticulitis  He reports he had a few antibiotics at home he took  His last colonoscopy was on 4/3/19 which showed Moderate sigmoid and distal descending diverticulosis  Otherwise normal colon  Recommended 8 year recall  Past Medical History:   Diagnosis Date    Diverticulitis     Hyperlipemia     Hypertension     Prediabetes     Psoriasis      Past Surgical History:   Procedure Laterality Date    COLONOSCOPY      MS COLONOSCOPY FLX DX W/COLLJ SPEC WHEN PFRMD N/A 4/3/2019    Procedure: COLONOSCOPY;  Surgeon: Einar Buerger, MD;  Location: [facility]; Service: Colorectal       Current Outpatient Medications:     albuterol (PROVENTIL HFA,VENTOLIN HFA) 90 mcg/act inhaler, Inhale 2 puffs every 6 (six) hours as needed for wheezing or shortness of breath, Disp: 1 Inhaler, Rfl: 0    budesonide-formoterol (SYMBICORT) 80-4 5 MCG/ACT inhaler, Inhale 2 puffs 2 (two) times a day Rinse mouth after use   (Patient taking differently: Inhale 2 puffs as needed Rinse mouth after use ), Disp: 1 Inhaler, Rfl: 2    Cholecalciferol (VITAMIN D) 2000 units CAPS, Take 1 capsule by mouth daily, Disp: , Rfl:     Hydrocortisone Butyrate 0 1 % CREA, as needed , Disp: , Rfl: 3    losartan (COZAAR) 50 mg tablet, Take 1 tablet (50 mg total) by mouth daily, Disp: 90 tablet, Rfl: 1    [START ON 1/7/2020] metroNIDAZOLE (FLAGYL) 500 mg tablet, Take 1 tablet at 1:00pm and 1 tablet at 10:00pm the day prior to surgery  , Disp: 2 tablet, Rfl: 0    Multiple Vitamin (MULTIVITAMIN) capsule, Take 1 capsule by mouth daily, Disp: , Rfl:     [START ON 1/7/2020] neomycin (MYCIFRADIN) 500 mg tablet, Take 2 tablets at 1:00pm and 2 tablets at 10:00pm the day prior to surgery  , Disp: 4 tablet, Rfl: 0    rosuvastatin (CRESTOR) 5 mg tablet, Take 1 tablet (5 mg total) by mouth daily, Disp: 90 tablet, Rfl: 1    Ustekinumab (STELARA SC), Inject under the skin every 3 (three) months , Disp: , Rfl:   Allergies as of 01/02/2020    (No Known Allergies)     Review of Systems   Gastrointestinal: Positive for abdominal pain  All other systems reviewed and are negative  There were no vitals filed for this visit  Physical Exam   Constitutional: He is oriented to person, place, and time  He appears well-developed and well-nourished  HENT:   Head: Normocephalic and atraumatic  Eyes: Pupils are equal, round, and reactive to light  EOM are normal    Neck: Normal range of motion  Neck supple  Cardiovascular: Normal rate and regular rhythm  Pulmonary/Chest: Effort normal and breath sounds normal    Abdominal: Soft  Bowel sounds are normal    Musculoskeletal: Normal range of motion  Neurological: He is alert and oriented to person, place, and time  Skin: Skin is warm and dry  Psychiatric: He has a normal mood and affect   His behavior is normal  Judgment and thought content normal

## 2020-01-03 LAB
ABO GROUP BLD: NORMAL
BLD GP AB SCN SERPL QL: NEGATIVE
RH BLD: POSITIVE
SPECIMEN EXPIRATION DATE: NORMAL

## 2020-01-06 ENCOUNTER — ANESTHESIA EVENT (OUTPATIENT)
Dept: PERIOP | Facility: HOSPITAL | Age: 45
DRG: 231 | End: 2020-01-06
Payer: COMMERCIAL

## 2020-01-07 NOTE — ANESTHESIA PREPROCEDURE EVALUATION
Review of Systems/Medical History  Patient summary reviewed        Cardiovascular  Hyperlipidemia, Hypertension ,    Pulmonary  Smoker cigarette smoker  , Tobacco cessation counseling given , Asthma ,        GI/Hepatic  Negative GI/hepatic ROS          Negative  ROS        Endo/Other  Negative endo/other ROS      GYN  Negative gynecology ROS          Hematology  Negative hematology ROS      Musculoskeletal  Negative musculoskeletal ROS        Neurology  Negative neurology ROS      Psychology   Negative psychology ROS                   Anesthesia Plan  ASA Score- 2     Anesthesia Type- general with ASA Monitors  Additional Monitors:   Airway Plan: ETT  Plan Factors-    Induction- intravenous  Postoperative Plan-     Informed Consent- Anesthetic plan and risks discussed with patient  I personally reviewed this patient with the CRNA  Discussed and agreed on the Anesthesia Plan with the CRNA  Brayan Willett

## 2020-01-08 ENCOUNTER — HOSPITAL ENCOUNTER (INPATIENT)
Facility: HOSPITAL | Age: 45
LOS: 6 days | Discharge: HOME | DRG: 231 | End: 2020-01-14
Attending: COLON & RECTAL SURGERY | Admitting: COLON & RECTAL SURGERY
Payer: COMMERCIAL

## 2020-01-08 ENCOUNTER — ANESTHESIA (OUTPATIENT)
Dept: PERIOP | Facility: HOSPITAL | Age: 45
DRG: 231 | End: 2020-01-08
Payer: COMMERCIAL

## 2020-01-08 DIAGNOSIS — F17.200 SMOKER: ICD-10-CM

## 2020-01-08 DIAGNOSIS — K57.32 DIVERTICULITIS LARGE INTESTINE W/O PERFORATION OR ABSCESS W/O BLEEDING: Primary | ICD-10-CM

## 2020-01-08 LAB
ABO GROUP BLD: NORMAL
ERYTHROCYTE [DISTWIDTH] IN BLOOD BY AUTOMATED COUNT: 13.5 % (ref 11.6–15.1)
GLUCOSE SERPL-MCNC: 129 MG/DL (ref 65–140)
HCT VFR BLD AUTO: 45.6 % (ref 36.5–49.3)
HGB BLD-MCNC: 15.1 G/DL (ref 12–17)
MCH RBC QN AUTO: 29.9 PG (ref 26.8–34.3)
MCHC RBC AUTO-ENTMCNC: 33.1 G/DL (ref 31.4–37.4)
MCV RBC AUTO: 90 FL (ref 82–98)
PLATELET # BLD AUTO: 261 THOUSANDS/UL (ref 149–390)
PMV BLD AUTO: 9.3 FL (ref 8.9–12.7)
RBC # BLD AUTO: 5.05 MILLION/UL (ref 3.88–5.62)
RH BLD: POSITIVE
WBC # BLD AUTO: 6.96 THOUSAND/UL (ref 4.31–10.16)

## 2020-01-08 PROCEDURE — 86901 BLOOD TYPING SEROLOGIC RH(D): CPT | Performed by: COLON & RECTAL SURGERY

## 2020-01-08 PROCEDURE — 0DBN4ZZ EXCISION OF SIGMOID COLON, PERCUTANEOUS ENDOSCOPIC APPROACH: ICD-10-PCS | Performed by: COLON & RECTAL SURGERY

## 2020-01-08 PROCEDURE — 82948 REAGENT STRIP/BLOOD GLUCOSE: CPT

## 2020-01-08 PROCEDURE — C9290 INJ, BUPIVACAINE LIPOSOME: HCPCS | Performed by: ANESTHESIOLOGY

## 2020-01-08 PROCEDURE — 85027 COMPLETE CBC AUTOMATED: CPT | Performed by: ANESTHESIOLOGY

## 2020-01-08 PROCEDURE — 44213 LAP MOBIL SPLENIC FL ADD-ON: CPT | Performed by: COLON & RECTAL SURGERY

## 2020-01-08 PROCEDURE — 88307 TISSUE EXAM BY PATHOLOGIST: CPT | Performed by: PATHOLOGY

## 2020-01-08 PROCEDURE — 44207 L COLECTOMY/COLOPROCTOSTOMY: CPT | Performed by: COLON & RECTAL SURGERY

## 2020-01-08 PROCEDURE — 86900 BLOOD TYPING SEROLOGIC ABO: CPT | Performed by: COLON & RECTAL SURGERY

## 2020-01-08 PROCEDURE — 45330 DIAGNOSTIC SIGMOIDOSCOPY: CPT | Performed by: COLON & RECTAL SURGERY

## 2020-01-08 PROCEDURE — 0DJD8ZZ INSPECTION OF LOWER INTESTINAL TRACT, VIA NATURAL OR ARTIFICIAL OPENING ENDOSCOPIC: ICD-10-PCS | Performed by: COLON & RECTAL SURGERY

## 2020-01-08 RX ORDER — SODIUM CHLORIDE, SODIUM LACTATE, POTASSIUM CHLORIDE, CALCIUM CHLORIDE 600; 310; 30; 20 MG/100ML; MG/100ML; MG/100ML; MG/100ML
INJECTION, SOLUTION INTRAVENOUS CONTINUOUS PRN
Status: DISCONTINUED | OUTPATIENT
Start: 2020-01-08 | End: 2020-01-08 | Stop reason: SURG

## 2020-01-08 RX ORDER — SODIUM CHLORIDE, SODIUM LACTATE, POTASSIUM CHLORIDE, CALCIUM CHLORIDE 600; 310; 30; 20 MG/100ML; MG/100ML; MG/100ML; MG/100ML
100 INJECTION, SOLUTION INTRAVENOUS CONTINUOUS
Status: DISCONTINUED | OUTPATIENT
Start: 2020-01-08 | End: 2020-01-09

## 2020-01-08 RX ORDER — ONDANSETRON 2 MG/ML
4 INJECTION INTRAMUSCULAR; INTRAVENOUS ONCE AS NEEDED
Status: DISCONTINUED | OUTPATIENT
Start: 2020-01-08 | End: 2020-01-08 | Stop reason: HOSPADM

## 2020-01-08 RX ORDER — PRAVASTATIN SODIUM 40 MG
40 TABLET ORAL
Status: DISCONTINUED | OUTPATIENT
Start: 2020-01-08 | End: 2020-01-10

## 2020-01-08 RX ORDER — HEPARIN SODIUM 5000 [USP'U]/ML
5000 INJECTION, SOLUTION INTRAVENOUS; SUBCUTANEOUS EVERY 8 HOURS SCHEDULED
Status: DISCONTINUED | OUTPATIENT
Start: 2020-01-08 | End: 2020-01-14 | Stop reason: HOSPADM

## 2020-01-08 RX ORDER — PROPOFOL 10 MG/ML
INJECTION, EMULSION INTRAVENOUS AS NEEDED
Status: DISCONTINUED | OUTPATIENT
Start: 2020-01-08 | End: 2020-01-08 | Stop reason: SURG

## 2020-01-08 RX ORDER — LIDOCAINE HYDROCHLORIDE 10 MG/ML
0.5 INJECTION, SOLUTION EPIDURAL; INFILTRATION; INTRACAUDAL; PERINEURAL ONCE AS NEEDED
Status: COMPLETED | OUTPATIENT
Start: 2020-01-08 | End: 2020-01-08

## 2020-01-08 RX ORDER — NEOMYCIN SULFATE 500 MG/1
1000 TABLET ORAL 3 TIMES DAILY
Status: DISCONTINUED | OUTPATIENT
Start: 2020-01-08 | End: 2020-01-08

## 2020-01-08 RX ORDER — ROCURONIUM BROMIDE 10 MG/ML
INJECTION, SOLUTION INTRAVENOUS AS NEEDED
Status: DISCONTINUED | OUTPATIENT
Start: 2020-01-08 | End: 2020-01-08 | Stop reason: SURG

## 2020-01-08 RX ORDER — ONDANSETRON 2 MG/ML
INJECTION INTRAMUSCULAR; INTRAVENOUS AS NEEDED
Status: DISCONTINUED | OUTPATIENT
Start: 2020-01-08 | End: 2020-01-08 | Stop reason: SURG

## 2020-01-08 RX ORDER — FENTANYL CITRATE 50 UG/ML
INJECTION, SOLUTION INTRAMUSCULAR; INTRAVENOUS AS NEEDED
Status: DISCONTINUED | OUTPATIENT
Start: 2020-01-08 | End: 2020-01-08 | Stop reason: SURG

## 2020-01-08 RX ORDER — SODIUM CHLORIDE, SODIUM LACTATE, POTASSIUM CHLORIDE, CALCIUM CHLORIDE 600; 310; 30; 20 MG/100ML; MG/100ML; MG/100ML; MG/100ML
125 INJECTION, SOLUTION INTRAVENOUS CONTINUOUS
Status: DISCONTINUED | OUTPATIENT
Start: 2020-01-08 | End: 2020-01-08

## 2020-01-08 RX ORDER — DEXAMETHASONE SODIUM PHOSPHATE 10 MG/ML
INJECTION, SOLUTION INTRAMUSCULAR; INTRAVENOUS AS NEEDED
Status: DISCONTINUED | OUTPATIENT
Start: 2020-01-08 | End: 2020-01-08 | Stop reason: SURG

## 2020-01-08 RX ORDER — ALBUTEROL SULFATE 90 UG/1
AEROSOL, METERED RESPIRATORY (INHALATION) AS NEEDED
Status: DISCONTINUED | OUTPATIENT
Start: 2020-01-08 | End: 2020-01-08 | Stop reason: SURG

## 2020-01-08 RX ORDER — MAGNESIUM HYDROXIDE 1200 MG/15ML
LIQUID ORAL AS NEEDED
Status: DISCONTINUED | OUTPATIENT
Start: 2020-01-08 | End: 2020-01-08 | Stop reason: HOSPADM

## 2020-01-08 RX ORDER — DEXAMETHASONE SODIUM PHOSPHATE 4 MG/ML
8 INJECTION, SOLUTION INTRA-ARTICULAR; INTRALESIONAL; INTRAMUSCULAR; INTRAVENOUS; SOFT TISSUE ONCE AS NEEDED
Status: DISCONTINUED | OUTPATIENT
Start: 2020-01-08 | End: 2020-01-08 | Stop reason: HOSPADM

## 2020-01-08 RX ORDER — DEXMEDETOMIDINE HYDROCHLORIDE 100 UG/ML
INJECTION, SOLUTION INTRAVENOUS AS NEEDED
Status: DISCONTINUED | OUTPATIENT
Start: 2020-01-08 | End: 2020-01-08 | Stop reason: SURG

## 2020-01-08 RX ORDER — HYDROMORPHONE HCL/PF 1 MG/ML
0.5 SYRINGE (ML) INJECTION
Status: DISCONTINUED | OUTPATIENT
Start: 2020-01-08 | End: 2020-01-08 | Stop reason: HOSPADM

## 2020-01-08 RX ORDER — BUDESONIDE AND FORMOTEROL FUMARATE DIHYDRATE 80; 4.5 UG/1; UG/1
2 AEROSOL RESPIRATORY (INHALATION) 2 TIMES DAILY
Status: DISCONTINUED | OUTPATIENT
Start: 2020-01-09 | End: 2020-01-14 | Stop reason: HOSPADM

## 2020-01-08 RX ORDER — ALBUTEROL SULFATE 90 UG/1
2 AEROSOL, METERED RESPIRATORY (INHALATION) EVERY 6 HOURS PRN
Status: DISCONTINUED | OUTPATIENT
Start: 2020-01-08 | End: 2020-01-14 | Stop reason: HOSPADM

## 2020-01-08 RX ORDER — ALBUTEROL SULFATE 2.5 MG/3ML
2.5 SOLUTION RESPIRATORY (INHALATION) ONCE
Status: DISCONTINUED | OUTPATIENT
Start: 2020-01-08 | End: 2020-01-08 | Stop reason: HOSPADM

## 2020-01-08 RX ORDER — LOSARTAN POTASSIUM 50 MG/1
50 TABLET ORAL DAILY
Status: DISCONTINUED | OUTPATIENT
Start: 2020-01-09 | End: 2020-01-10

## 2020-01-08 RX ORDER — LIDOCAINE HYDROCHLORIDE 10 MG/ML
INJECTION, SOLUTION EPIDURAL; INFILTRATION; INTRACAUDAL; PERINEURAL AS NEEDED
Status: DISCONTINUED | OUTPATIENT
Start: 2020-01-08 | End: 2020-01-08 | Stop reason: SURG

## 2020-01-08 RX ORDER — SODIUM CHLORIDE 9 MG/ML
INJECTION, SOLUTION INTRAVENOUS CONTINUOUS PRN
Status: DISCONTINUED | OUTPATIENT
Start: 2020-01-08 | End: 2020-01-08 | Stop reason: SURG

## 2020-01-08 RX ORDER — ACETAMINOPHEN 325 MG/1
650 TABLET ORAL EVERY 6 HOURS PRN
Status: DISCONTINUED | OUTPATIENT
Start: 2020-01-08 | End: 2020-01-10

## 2020-01-08 RX ORDER — ONDANSETRON 2 MG/ML
4 INJECTION INTRAMUSCULAR; INTRAVENOUS EVERY 6 HOURS PRN
Status: DISCONTINUED | OUTPATIENT
Start: 2020-01-08 | End: 2020-01-14 | Stop reason: HOSPADM

## 2020-01-08 RX ORDER — SODIUM CHLORIDE 9 MG/ML
100 INJECTION, SOLUTION INTRAVENOUS CONTINUOUS
Status: DISCONTINUED | OUTPATIENT
Start: 2020-01-08 | End: 2020-01-09

## 2020-01-08 RX ORDER — CEFAZOLIN SODIUM 2 G/50ML
2000 SOLUTION INTRAVENOUS ONCE
Status: DISCONTINUED | OUTPATIENT
Start: 2020-01-08 | End: 2020-01-08 | Stop reason: HOSPADM

## 2020-01-08 RX ORDER — MIDAZOLAM HYDROCHLORIDE 2 MG/2ML
INJECTION, SOLUTION INTRAMUSCULAR; INTRAVENOUS AS NEEDED
Status: DISCONTINUED | OUTPATIENT
Start: 2020-01-08 | End: 2020-01-08 | Stop reason: SURG

## 2020-01-08 RX ORDER — NEOSTIGMINE METHYLSULFATE 1 MG/ML
INJECTION INTRAVENOUS AS NEEDED
Status: DISCONTINUED | OUTPATIENT
Start: 2020-01-08 | End: 2020-01-08 | Stop reason: SURG

## 2020-01-08 RX ORDER — HYDROMORPHONE HCL/PF 1 MG/ML
SYRINGE (ML) INJECTION AS NEEDED
Status: DISCONTINUED | OUTPATIENT
Start: 2020-01-08 | End: 2020-01-08 | Stop reason: SURG

## 2020-01-08 RX ORDER — GLYCOPYRROLATE 0.2 MG/ML
INJECTION INTRAMUSCULAR; INTRAVENOUS AS NEEDED
Status: DISCONTINUED | OUTPATIENT
Start: 2020-01-08 | End: 2020-01-08 | Stop reason: SURG

## 2020-01-08 RX ORDER — FENTANYL CITRATE/PF 50 MCG/ML
50 SYRINGE (ML) INJECTION
Status: DISCONTINUED | OUTPATIENT
Start: 2020-01-08 | End: 2020-01-08 | Stop reason: HOSPADM

## 2020-01-08 RX ORDER — CEFAZOLIN SODIUM 2 G/50ML
SOLUTION INTRAVENOUS AS NEEDED
Status: DISCONTINUED | OUTPATIENT
Start: 2020-01-08 | End: 2020-01-08 | Stop reason: SURG

## 2020-01-08 RX ORDER — METRONIDAZOLE 500 MG/1
1000 TABLET ORAL 3 TIMES DAILY
Status: DISCONTINUED | OUTPATIENT
Start: 2020-01-08 | End: 2020-01-08

## 2020-01-08 RX ORDER — ACETAMINOPHEN 325 MG/1
975 TABLET ORAL ONCE
Status: COMPLETED | OUTPATIENT
Start: 2020-01-08 | End: 2020-01-08

## 2020-01-08 RX ORDER — SUCCINYLCHOLINE/SOD CL,ISO/PF 100 MG/5ML
SYRINGE (ML) INTRAVENOUS AS NEEDED
Status: DISCONTINUED | OUTPATIENT
Start: 2020-01-08 | End: 2020-01-08 | Stop reason: SURG

## 2020-01-08 RX ADMIN — PROPOFOL 300 MG: 10 INJECTION, EMULSION INTRAVENOUS at 14:05

## 2020-01-08 RX ADMIN — FENTANYL CITRATE 75 MCG: 50 INJECTION, SOLUTION INTRAMUSCULAR; INTRAVENOUS at 16:03

## 2020-01-08 RX ADMIN — ALBUTEROL SULFATE 2 PUFF: 90 AEROSOL, METERED RESPIRATORY (INHALATION) at 18:35

## 2020-01-08 RX ADMIN — ROCURONIUM BROMIDE 20 MG: 50 INJECTION, SOLUTION INTRAVENOUS at 16:20

## 2020-01-08 RX ADMIN — DEXMEDETOMIDINE 4 MCG: 100 INJECTION, SOLUTION, CONCENTRATE INTRAVENOUS at 16:31

## 2020-01-08 RX ADMIN — FENTANYL CITRATE 50 MCG: 50 INJECTION, SOLUTION INTRAMUSCULAR; INTRAVENOUS at 19:15

## 2020-01-08 RX ADMIN — SODIUM CHLORIDE, SODIUM LACTATE, POTASSIUM CHLORIDE, AND CALCIUM CHLORIDE: .6; .31; .03; .02 INJECTION, SOLUTION INTRAVENOUS at 18:28

## 2020-01-08 RX ADMIN — HYDROMORPHONE HYDROCHLORIDE 0.3 MG: 1 INJECTION, SOLUTION INTRAMUSCULAR; INTRAVENOUS; SUBCUTANEOUS at 16:03

## 2020-01-08 RX ADMIN — LIDOCAINE HYDROCHLORIDE 0.5 ML: 10 INJECTION, SOLUTION EPIDURAL; INFILTRATION; INTRACAUDAL; PERINEURAL at 13:47

## 2020-01-08 RX ADMIN — DEXAMETHASONE SODIUM PHOSPHATE 10 MG: 10 INJECTION, SOLUTION INTRAMUSCULAR; INTRAVENOUS at 15:17

## 2020-01-08 RX ADMIN — PROPOFOL 100 MG: 10 INJECTION, EMULSION INTRAVENOUS at 18:22

## 2020-01-08 RX ADMIN — ACETAMINOPHEN 975 MG: 325 TABLET ORAL at 13:48

## 2020-01-08 RX ADMIN — SODIUM CHLORIDE, SODIUM LACTATE, POTASSIUM CHLORIDE, AND CALCIUM CHLORIDE 1000 ML: .6; .31; .03; .02 INJECTION, SOLUTION INTRAVENOUS at 22:42

## 2020-01-08 RX ADMIN — FENTANYL CITRATE 50 MCG: 50 INJECTION, SOLUTION INTRAMUSCULAR; INTRAVENOUS at 18:51

## 2020-01-08 RX ADMIN — SODIUM CHLORIDE, SODIUM LACTATE, POTASSIUM CHLORIDE, AND CALCIUM CHLORIDE 125 ML/HR: .6; .31; .03; .02 INJECTION, SOLUTION INTRAVENOUS at 13:47

## 2020-01-08 RX ADMIN — Medication 500 MG: at 14:25

## 2020-01-08 RX ADMIN — FENTANYL CITRATE 50 MCG: 50 INJECTION, SOLUTION INTRAMUSCULAR; INTRAVENOUS at 14:57

## 2020-01-08 RX ADMIN — HEPARIN SODIUM 5000 UNITS: 5000 INJECTION INTRAVENOUS; SUBCUTANEOUS at 21:37

## 2020-01-08 RX ADMIN — GLYCOPYRROLATE 0.2 MG: 0.2 INJECTION, SOLUTION INTRAMUSCULAR; INTRAVENOUS at 15:06

## 2020-01-08 RX ADMIN — SODIUM CHLORIDE, SODIUM LACTATE, POTASSIUM CHLORIDE, AND CALCIUM CHLORIDE: .6; .31; .03; .02 INJECTION, SOLUTION INTRAVENOUS at 16:00

## 2020-01-08 RX ADMIN — ONDANSETRON 4 MG: 2 INJECTION INTRAMUSCULAR; INTRAVENOUS at 15:17

## 2020-01-08 RX ADMIN — PHENYLEPHRINE HYDROCHLORIDE 20 MCG/MIN: 10 INJECTION INTRAVENOUS at 14:32

## 2020-01-08 RX ADMIN — SODIUM CHLORIDE 100 ML/HR: 0.9 INJECTION, SOLUTION INTRAVENOUS at 21:43

## 2020-01-08 RX ADMIN — CEFAZOLIN SODIUM 2000 MG: 2 SOLUTION INTRAVENOUS at 14:25

## 2020-01-08 RX ADMIN — FENTANYL CITRATE 50 MCG: 50 INJECTION, SOLUTION INTRAMUSCULAR; INTRAVENOUS at 14:49

## 2020-01-08 RX ADMIN — ALBUTEROL SULFATE 3 PUFF: 90 AEROSOL, METERED RESPIRATORY (INHALATION) at 17:44

## 2020-01-08 RX ADMIN — SODIUM CHLORIDE: 0.9 INJECTION, SOLUTION INTRAVENOUS at 14:13

## 2020-01-08 RX ADMIN — LIDOCAINE HYDROCHLORIDE 50 MG: 10 INJECTION, SOLUTION EPIDURAL; INFILTRATION; INTRACAUDAL; PERINEURAL at 14:04

## 2020-01-08 RX ADMIN — SODIUM CHLORIDE 100 ML/HR: 0.9 INJECTION, SOLUTION INTRAVENOUS at 18:59

## 2020-01-08 RX ADMIN — CEFAZOLIN SODIUM 2000 MG: 2 SOLUTION INTRAVENOUS at 18:08

## 2020-01-08 RX ADMIN — ROCURONIUM BROMIDE 50 MG: 50 INJECTION, SOLUTION INTRAVENOUS at 14:20

## 2020-01-08 RX ADMIN — Medication 200 MG: at 14:04

## 2020-01-08 RX ADMIN — PHENYLEPHRINE HYDROCHLORIDE 200 MCG: 10 INJECTION INTRAVENOUS at 16:45

## 2020-01-08 RX ADMIN — GLYCOPYRROLATE 0.8 MG: 0.2 INJECTION, SOLUTION INTRAMUSCULAR; INTRAVENOUS at 18:27

## 2020-01-08 RX ADMIN — FENTANYL CITRATE 25 MCG: 50 INJECTION, SOLUTION INTRAMUSCULAR; INTRAVENOUS at 17:24

## 2020-01-08 RX ADMIN — PROPOFOL 20 MG: 10 INJECTION, EMULSION INTRAVENOUS at 16:01

## 2020-01-08 RX ADMIN — PHENYLEPHRINE HYDROCHLORIDE 100 MCG: 10 INJECTION INTRAVENOUS at 16:48

## 2020-01-08 RX ADMIN — PROPOFOL 30 MG: 10 INJECTION, EMULSION INTRAVENOUS at 16:02

## 2020-01-08 RX ADMIN — SODIUM CHLORIDE, SODIUM LACTATE, POTASSIUM CHLORIDE, AND CALCIUM CHLORIDE: .6; .31; .03; .02 INJECTION, SOLUTION INTRAVENOUS at 14:00

## 2020-01-08 RX ADMIN — HYDROMORPHONE HYDROCHLORIDE: 10 INJECTION INTRAMUSCULAR; INTRAVENOUS; SUBCUTANEOUS at 19:00

## 2020-01-08 RX ADMIN — ALBUTEROL SULFATE 6 PUFF: 90 AEROSOL, METERED RESPIRATORY (INHALATION) at 14:16

## 2020-01-08 RX ADMIN — MIDAZOLAM 2 MG: 1 INJECTION INTRAMUSCULAR; INTRAVENOUS at 13:47

## 2020-01-08 RX ADMIN — HYDROMORPHONE HYDROCHLORIDE 0.2 MG: 1 INJECTION, SOLUTION INTRAMUSCULAR; INTRAVENOUS; SUBCUTANEOUS at 15:33

## 2020-01-08 RX ADMIN — NEOSTIGMINE METHYLSULFATE 5 MG: 1 INJECTION INTRAVENOUS at 18:27

## 2020-01-08 RX ADMIN — ROCURONIUM BROMIDE 20 MG: 50 INJECTION, SOLUTION INTRAVENOUS at 14:59

## 2020-01-08 RX ADMIN — FENTANYL CITRATE 100 MCG: 50 INJECTION, SOLUTION INTRAMUSCULAR; INTRAVENOUS at 14:05

## 2020-01-08 RX ADMIN — PHENYLEPHRINE HYDROCHLORIDE 200 MCG: 10 INJECTION INTRAVENOUS at 14:34

## 2020-01-08 NOTE — ANESTHESIA POSTPROCEDURE EVALUATION
Post-Op Assessment Note    CV Status:  Stable  Pain Score: 6       Mental Status:  Alert and awake   Hydration Status:  Euvolemic   PONV Controlled:  Controlled   Airway Patency:  Patent   Post Op Vitals Reviewed: Yes      Staff: CRNA, Anesthesiologist           BP   164/84   Temp   97 6   Pulse (P) 96 (01/08/20 1843)   Resp (P) 15 (01/08/20 1843)    SpO2 (P) 97 % (01/08/20 1843)

## 2020-01-08 NOTE — INTERVAL H&P NOTE
H&P reviewed  After examining the patient I find no changes in the patients condition since the H&P had been written      Vitals:    01/08/20 1311   BP: 154/82   Pulse: 82   Resp: 20   Temp: 98 4 °F (36 9 °C)   SpO2: 95%

## 2020-01-08 NOTE — OP NOTE
OPERATIVE REPORT  PATIENT NAME: Navarro Hendrickson    :  1975  MRN: [id number]  Pt Location: [facility] OR ROOM 15    SURGERY DATE: 2020    Surgeon(s) and Role:     * Giselle Farnsworth MD - Primary     * Cynthia Light - Assisting    Preop Diagnosis:  Diverticulitis large intestine w/o perforation or abscess w/o bleeding [K57 32]    Post-Op Diagnosis Codes:     * Diverticulitis large intestine w/o perforation or abscess w/o bleeding [K57 32]    Procedure(s) (LRB):  RESECTION COLON SIGMOID LAPAROSCOPIC, LAPAROSCOPIC SPLENIC FLEXURE MOBILIZATION (N/A)  SIGMOIDOSCOPY FLEXIBLE (N/A)    Specimen(s):  ID Type Source Tests Collected by Time Destination   1 : Sigmoid Colon Tissue Large Intestine, Sigmoid Colon TISSUE EXAM Giselle Farnsworth MD 2020 1709        Estimated Blood Loss:   300 mL    Drains:  Urethral Catheter Non-latex 16 Fr  (Active)   Number of days: 0       Anesthesia Type:   General w/ Regional    Operative Indications:  Diverticulitis large intestine w/o perforation or abscess w/o bleeding [K57 32]      Operative Findings:  Significant thickening of the proximal sigmoid distal descending colon consistent with chronic diverticulitis    Complications:   None    Procedure and Technique:  After preoperative identification in the preoperative holding area the patient was taken the operating room placed in the supine position  After satisfactory induction of general endotracheal anesthesia appropriate placement of anesthesia monitors, patient was placed in a modified low lithotomy position  Patient was secured to the operating room table  Patient was prepped and draped in usual sterile fashion  Antibiotics were given prior to incision  Time-out was undertaken procedure begun  Supraumbilical incision was made  This was taken down to the fascia  5 mm trocar was placed by Optiview technique  Abdomen is insufflated to 15 mm of mercury  Area was inspected for signs of injury upon entry    None were seen   Abdomen was then explored  Patient had significant inflammatory changes of the proximal sigmoid and distal descending colon  Otherwise his colon appeared normal   No obvious other disease was noted on cursory inspection of peritoneal surfaces  5 mm trocars were placed in right upper and right lower quadrant  Pfannenstiel incision was made through which a hand port was placed to allow specimen extirpation    We performed medial to lateral mobilization  The retrorectal space was entered the rectum was dissected free posteriorly  The left ureter was identified and swept into the retroperitoneum  This was taken up to the level of the sigmoidal branches of the CHILO  Lateral to medial dissection was done on undertaken  Again the left ureter was identified septum to the retroperitoneum  We were able to take this dissection up to the descending colon  Given the area of diverticular disease at the distal descending colon was decided to mobilize the splenic flexure  The descending colon was taken up to the splenic flexure  The omentum was then passed cephalad and the space between the greater omentum and the transverse colon was entered  This was taken up to the splenic flexure  Splenic flexure was quite high at the inferior most border of the spleen  We eventually were able to dissect this down  This took approximately 45 minutes due to complexity of the mobilization of splenic flexure  Once the colon was completely mobilized we now prepared for transection  The posterior mesorectum of the upper rectum was divided using the EnSeal   This was done at an area below the patient's diverticular changes at soft supple rectum  Contour stapler was placed and fired  The colon was then brought out through the Pfannenstiel incision  At an area of the mid descending colon area was noted to be soft and supple in easily come down to the pelvis under no significant tension    Pursestring clamp was placed there would say 2 0 Prolene was used to create a pursestring  This was sutured into place around a 29 mm CDH anvil  This was at least 5 cm proximal to the area of significant diverticulitis and diverticular disease  The descending colon at this level soft and supple an appropriate for anastomosis  The scan done of the pelvis under no tension  We now prepared for anastomosis  Perineal  brought the [facility] stapler up to the rectal stump  Eric was deployed just anterior to the staple line  The anvil was affixed to this area until audible click was noted  Stapler was closed and fired prepared to fire  Care was taken not to have any extraneous structures contained within our anastomosis  Care was taken not to twist the small on its mesentery  Stapler was fired and removed from the anus  Back table inspection revealed 2 full-thickness anastomotic rings both proximally and distally  Pelvis was then filled with saline  Perineal  both flexible sigmoidoscope up to the anastomosis  The anastomosis noted be circumferentially intact with no significant bleeding  Mucosa both proximally distally this was pink and well vascularized  No air leak was noted on air testing  As such, we prepared a complete our procedure  The abdomen is inspected for hemostasis  This was noted to be excellent  Greater omentum was placed into the pelvis  All sponge needle counts were correct  All trocars were removed under direct vision  Pfannenstiel incision hand port was removed as well  Pfannenstiel incision was closed in layers  1  Vicryl is used to close the peritoneum  Fascia was closed using running 1  PDS  Subcutaneous tissues were copiously irrigated  Four 0 Monocryl with histoacryl was used to close skin  Patient was transferred to recovery room in stable condition       I was present for the entire procedure    Patient Disposition:  PACU     SIGNATURE: Barbie Steele MD  DATE: January 8, 2020  TIME: 6:08 PM

## 2020-01-09 LAB
ANION GAP SERPL CALCULATED.3IONS-SCNC: 4 MMOL/L (ref 4–13)
BASOPHILS # BLD AUTO: 0.01 THOUSANDS/ΜL (ref 0–0.1)
BASOPHILS NFR BLD AUTO: 0 % (ref 0–1)
BUN SERPL-MCNC: 11 MG/DL (ref 5–25)
CALCIUM SERPL-MCNC: 8.1 MG/DL (ref 8.3–10.1)
CHLORIDE SERPL-SCNC: 106 MMOL/L (ref 100–108)
CO2 SERPL-SCNC: 26 MMOL/L (ref 21–32)
CREAT SERPL-MCNC: 0.85 MG/DL (ref 0.6–1.3)
EOSINOPHIL # BLD AUTO: 0 THOUSAND/ΜL (ref 0–0.61)
EOSINOPHIL NFR BLD AUTO: 0 % (ref 0–6)
ERYTHROCYTE [DISTWIDTH] IN BLOOD BY AUTOMATED COUNT: 13.4 % (ref 11.6–15.1)
GFR SERPL CREATININE-BSD FRML MDRD: 107 ML/MIN/1.73SQ M
GLUCOSE SERPL-MCNC: 117 MG/DL (ref 65–140)
HCT VFR BLD AUTO: 37.9 % (ref 36.5–49.3)
HGB BLD-MCNC: 12.2 G/DL (ref 12–17)
IMM GRANULOCYTES # BLD AUTO: 0.04 THOUSAND/UL (ref 0–0.2)
IMM GRANULOCYTES NFR BLD AUTO: 0 % (ref 0–2)
LYMPHOCYTES # BLD AUTO: 1.05 THOUSANDS/ΜL (ref 0.6–4.47)
LYMPHOCYTES NFR BLD AUTO: 11 % (ref 14–44)
MAGNESIUM SERPL-MCNC: 2.1 MG/DL (ref 1.6–2.6)
MCH RBC QN AUTO: 29.5 PG (ref 26.8–34.3)
MCHC RBC AUTO-ENTMCNC: 32.2 G/DL (ref 31.4–37.4)
MCV RBC AUTO: 92 FL (ref 82–98)
MONOCYTES # BLD AUTO: 0.61 THOUSAND/ΜL (ref 0.17–1.22)
MONOCYTES NFR BLD AUTO: 6 % (ref 4–12)
NEUTROPHILS # BLD AUTO: 8.33 THOUSANDS/ΜL (ref 1.85–7.62)
NEUTS SEG NFR BLD AUTO: 83 % (ref 43–75)
NRBC BLD AUTO-RTO: 0 /100 WBCS
PLATELET # BLD AUTO: 222 THOUSANDS/UL (ref 149–390)
PMV BLD AUTO: 10 FL (ref 8.9–12.7)
POTASSIUM SERPL-SCNC: 4.3 MMOL/L (ref 3.5–5.3)
RBC # BLD AUTO: 4.13 MILLION/UL (ref 3.88–5.62)
SODIUM SERPL-SCNC: 136 MMOL/L (ref 136–145)
WBC # BLD AUTO: 10.04 THOUSAND/UL (ref 4.31–10.16)

## 2020-01-09 PROCEDURE — 80048 BASIC METABOLIC PNL TOTAL CA: CPT | Performed by: SURGERY

## 2020-01-09 PROCEDURE — 85025 COMPLETE CBC W/AUTO DIFF WBC: CPT | Performed by: SURGERY

## 2020-01-09 PROCEDURE — 83735 ASSAY OF MAGNESIUM: CPT | Performed by: SURGERY

## 2020-01-09 RX ORDER — DEXTROSE, SODIUM CHLORIDE, AND POTASSIUM CHLORIDE 5; .45; .15 G/100ML; G/100ML; G/100ML
125 INJECTION INTRAVENOUS CONTINUOUS
Status: DISCONTINUED | OUTPATIENT
Start: 2020-01-09 | End: 2020-01-10

## 2020-01-09 RX ADMIN — DEXTROSE, SODIUM CHLORIDE, AND POTASSIUM CHLORIDE 100 ML/HR: 5; .45; .15 INJECTION INTRAVENOUS at 06:38

## 2020-01-09 RX ADMIN — HEPARIN SODIUM 5000 UNITS: 5000 INJECTION INTRAVENOUS; SUBCUTANEOUS at 21:52

## 2020-01-09 RX ADMIN — DEXTROSE, SODIUM CHLORIDE, AND POTASSIUM CHLORIDE 100 ML/HR: 5; .45; .15 INJECTION INTRAVENOUS at 17:26

## 2020-01-09 RX ADMIN — PRAVASTATIN SODIUM 40 MG: 40 TABLET ORAL at 17:26

## 2020-01-09 RX ADMIN — HEPARIN SODIUM 5000 UNITS: 5000 INJECTION INTRAVENOUS; SUBCUTANEOUS at 14:43

## 2020-01-09 RX ADMIN — BUDESONIDE AND FORMOTEROL FUMARATE DIHYDRATE 2 PUFF: 80; 4.5 AEROSOL RESPIRATORY (INHALATION) at 08:08

## 2020-01-09 RX ADMIN — ALBUTEROL SULFATE 2 PUFF: 90 AEROSOL, METERED RESPIRATORY (INHALATION) at 16:00

## 2020-01-09 RX ADMIN — ALBUTEROL SULFATE 2 PUFF: 90 AEROSOL, METERED RESPIRATORY (INHALATION) at 22:43

## 2020-01-09 RX ADMIN — LOSARTAN POTASSIUM 50 MG: 50 TABLET, FILM COATED ORAL at 08:08

## 2020-01-09 RX ADMIN — HEPARIN SODIUM 5000 UNITS: 5000 INJECTION INTRAVENOUS; SUBCUTANEOUS at 05:19

## 2020-01-09 RX ADMIN — BUDESONIDE AND FORMOTEROL FUMARATE DIHYDRATE 2 PUFF: 80; 4.5 AEROSOL RESPIRATORY (INHALATION) at 17:26

## 2020-01-09 RX ADMIN — Medication 1 SPRAY: at 22:42

## 2020-01-09 NOTE — PROGRESS NOTES
Progress Note - Colorectal Surgery   Daxa Miranda 45 y o  male MRN: [id number]  Unit/Bed#: [facility] 626-01 Encounter: [id number]    Assessment:  Pt is a 45 y o  M w/ hx diverticulitis s/p 1/8 lap, hand assisted sigmoidectomy    Plan:  Clears  MIVF  D/c michaud  PCA-D  IS/OOB/ambulate  SQH/SCDs    Subjective/Objective   Chief Complaint:     Subjective: AKIL  Pain controlled  -F/BM  Tolerating clears  Received 1L around 10pm for low urine output, has since picked up and been adequate  Objective:     Blood pressure 142/86, pulse 71, temperature 98 4 °F (36 9 °C), resp  rate 21, height 5' 6 5" (1 689 m), weight 93 9 kg (207 lb), SpO2 96 %  ,Body mass index is 32 91 kg/m²  Intake/Output Summary (Last 24 hours) at 1/9/2020 0437  Last data filed at 1/9/2020 1837  Gross per 24 hour   Intake 4972 5 ml   Output 2125 ml   Net 2847 5 ml       Invasive Devices     Peripheral Intravenous Line            Peripheral IV 01/08/20 Left Hand less than 1 day    Peripheral IV 01/08/20 Right Hand less than 1 day          Drain            Urethral Catheter Non-latex 16 Fr  less than 1 day                Physical Exam: NAD  Atraumatic/normocephalic  AAOX3  Normal mood and affect  Normal respiratory effort  Soft, sensitive skin, tender over incisions, distended  Incisions c/d/i  Skin warm/dry  Cap refil <2 sec      Lab, Imaging and other studies:  I have personally reviewed pertinent lab results    , CBC:   Lab Results   Component Value Date    WBC 6 96 01/08/2020    HGB 15 1 01/08/2020    HCT 45 6 01/08/2020    MCV 90 01/08/2020     01/08/2020    MCH 29 9 01/08/2020    MCHC 33 1 01/08/2020    RDW 13 5 01/08/2020    MPV 9 3 01/08/2020   , CMP: No results found for: SODIUM, K, CL, CO2, ANIONGAP, BUN, CREATININE, GLUCOSE, CALCIUM, AST, ALT, ALKPHOS, PROT, BILITOT, EGFR  VTE Pharmacologic Prophylaxis: Heparin  VTE Mechanical Prophylaxis: sequential compression device

## 2020-01-09 NOTE — PHYSICAL THERAPY NOTE
Physical Therapy Screen    Patient Name: Asif Harrison    KDXTY'P Date: 1/9/2020     Problem List  Principal Problem:    Diverticulitis large intestine w/o perforation or abscess w/o bleeding       Past Medical History  Past Medical History:   Diagnosis Date    Diverticulitis     Hyperlipemia     Hypertension     Prediabetes     Psoriasis         Past Surgical History  Past Surgical History:   Procedure Laterality Date    COLONOSCOPY      MO COLONOSCOPY FLX DX W/COLLJ SPEC WHEN PFRMD N/A 4/3/2019    Procedure: COLONOSCOPY;  Surgeon: Damien Dixon MD;  Location: [facility]; Service: Colorectal    MO LAP,SURG,COLECTOMY, PARTIAL, W/ANAST N/A 1/8/2020    Procedure: RESECTION COLON SIGMOID LAPAROSCOPIC, LAPAROSCOPIC SPLENIC FLEXURE MOBILIZATION;  Surgeon: Damien Dixon MD;  Location[facility];  Service: Colorectal    MO SIGMOIDOSCOPY FLX DX W/COLLJ SPEC BR/WA IF PFRMD N/A 1/8/2020    Procedure: SIGMOIDOSCOPY FLEXIBLE;  Surgeon: Damien Dixon MD;  Location[facility];  Service: Colorectal        PT orders received  Chart review completed  Pt noted to have been I ambulating halls multiple times today  Pt offered no concerns related to mobility post D/C  D/C inpt skilled PT, please reconsult if functional decline occurs      Mak Lan, PT

## 2020-01-09 NOTE — PLAN OF CARE
Problem: PAIN - ADULT  Goal: Verbalizes/displays adequate comfort level or baseline comfort level  Description  Interventions:  - Encourage patient to monitor pain and request assistance  - Assess pain using appropriate pain scale  - Administer analgesics based on type and severity of pain and evaluate response  - Implement non-pharmacological measures as appropriate and evaluate response  - Consider cultural and social influences on pain and pain management  - Notify physician/advanced practitioner if interventions unsuccessful or patient reports new pain  Outcome: Progressing     Problem: INFECTION - ADULT  Goal: Absence or prevention of progression during hospitalization  Description  INTERVENTIONS:  - Assess and monitor for signs and symptoms of infection  - Monitor lab/diagnostic results  - Monitor all insertion sites, i e  indwelling lines, tubes, and drains  - Monitor endotracheal if appropriate and nasal secretions for changes in amount and color  - [place] appropriate cooling/warming therapies per order  - Administer medications as ordered  - Instruct and encourage patient and family to use good hand hygiene technique  - Identify and instruct in appropriate isolation precautions for identified infection/condition  Outcome: Progressing  Goal: Absence of fever/infection during neutropenic period  Description  INTERVENTIONS:  - Monitor WBC    Outcome: Progressing     Problem: SAFETY ADULT  Goal: Patient will remain free of falls  Description  INTERVENTIONS:  - Assess patient frequently for physical needs  -  Identify cognitive and physical deficits and behaviors that affect risk of falls    -  [place] fall precautions as indicated by assessment   - Educate patient/family on patient safety including physical limitations  - Instruct patient to call for assistance with activity based on assessment  - Modify environment to reduce risk of injury  - Consider OT/PT consult to assist with strengthening/mobility  Outcome: Progressing  Goal: Maintain or return to baseline ADL function  Description  INTERVENTIONS:  -  Assess patient's ability to carry out ADLs; assess patient's baseline for ADL function and identify physical deficits which impact ability to perform ADLs (bathing, care of mouth/teeth, toileting, grooming, dressing, etc )  - Assess/evaluate cause of self-care deficits   - Assess range of motion  - Assess patient's mobility; develop plan if impaired  - Assess patient's need for assistive devices and provide as appropriate  - Encourage maximum independence but intervene and supervise when necessary  - Involve family in performance of ADLs  - Assess for home care needs following discharge   - Consider OT consult to assist with ADL evaluation and planning for discharge  - Provide patient education as appropriate  Outcome: Progressing  Goal: Maintain or return mobility status to optimal level  Description  INTERVENTIONS:  - Assess patient's baseline mobility status (ambulation, transfers, stairs, etc )    - Identify cognitive and physical deficits and behaviors that affect mobility  - Identify mobility aids required to assist with transfers and/or ambulation (gait belt, sit-to-stand, lift, walker, cane, etc )  - [place] fall precautions as indicated by assessment  - Record patient progress and toleration of activity level on Mobility SBAR; progress patient to next Phase/Stage  - Instruct patient to call for assistance with activity based on assessment  - Consider rehabilitation consult to assist with strengthening/weightbearing, etc   Outcome: Progressing     Problem: DISCHARGE PLANNING  Goal: Discharge to home or other facility with appropriate resources  Description  INTERVENTIONS:  - Identify barriers to discharge w/patient and caregiver  - Arrange for needed discharge resources and transportation as appropriate  - Identify discharge learning needs (meds, wound care, etc )  - Arrange for interpretive services to assist at discharge as needed  - Refer to Case Management Department for coordinating discharge planning if the patient needs post-hospital services based on physician/advanced practitioner order or complex needs related to functional status, cognitive ability, or social support system  Outcome: Progressing     Problem: Knowledge Deficit  Goal: Patient/family/caregiver demonstrates understanding of disease process, treatment plan, medications, and discharge instructions  Description  Complete learning assessment and assess knowledge base    Interventions:  - Provide teaching at level of understanding  - Provide teaching via preferred learning methods  Outcome: Progressing

## 2020-01-09 NOTE — UTILIZATION REVIEW
Initial Clinical Review    Elective Inpatient surgical procedure  Age/Sex: 45 y o  male  Surgery Date: 1/8  Procedure: S/P RESECTION COLON SIGMOID LAPAROSCOPIC, LAPAROSCOPIC SPLENIC FLEXURE MOBILIZATION (N/A)  SIGMOIDOSCOPY FLEXIBLE (N/A)  Anesthesia:General w/ Regional    POD#1 Progress Note:   Clears, IVF, d/c michaud and PCA-D pump  Incentive spirometry, OOB  Tolerated clears  Received 1L around 10pm for low urine output, has since picked up and been adequate  Admission Orders: Date/Time/Statement: Inpatient Admission Orders (From admission, onward)     Ordered        01/08/20 [address]  Inpatient Admission  Once                   Orders Placed This Encounter   Procedures    Inpatient Admission     Standing Status:   Standing     Number of Occurrences:   1     Order Specific Question:   Admitting Physician     Answer:   Jet Ling [[id number]]     Order Specific Question:   Level of Care     Answer:   Med Surg [[id number]]     Order Specific Question:   Estimated length of stay     Answer:   More than 2 Midnights     Order Specific Question:   Certification     Answer:   I certify that inpatient services are medically necessary for this patient for a duration of greater than two midnights  See H&P and MD Progress Notes for additional information about the patient's course of treatment       Vital Signs: /82   Pulse 87   Temp 99 °F (37 2 °C)   Resp 18   Ht 5' 6 5" (1 689 m)   Wt 93 9 kg (207 lb)   SpO2 93%   BMI 32 91 kg/m²      Diet: Clear  Mobility: Activity as tolerated  DVT Prophylaxis: Sequential compression device    Medications/Pain Control:   Scheduled Medications:  Medications:  budesonide-formoterol 2 puff Inhalation BID   bupivacaine liposomal 20 mL Infiltration Once   heparin (porcine) 5,000 Units Subcutaneous Q8H [address]   losartan 50 mg Oral Daily   pravastatin 40 mg Oral Daily With Dinner     Continuous IV Infusions:  dextrose 5 % and sodium chloride 0 45 % with KCl 20 mEq/L 100 mL/hr Intravenous Continuous   HYDROmorphone  Intravenous Continuous     PRN Meds:  acetaminophen 650 mg Oral Q6H PRN   albuterol 2 puff Inhalation Q6H PRN   lactated ringers 1,000 mL Intravenous Once PRN   And      lactated ringers 1,000 mL Intravenous Once PRN   naloxone 0 04 mg Intravenous Q3 min PRN   ondansetron 4 mg Intravenous Q6H PRN   phenol 1 spray Mouth/Throat Q2H PRN   sodium chloride 1,000 mL Intravenous Once PRN   And      sodium chloride 1,000 mL Intravenous Once PRN       Network Utilization Review Department  [email or web address]  ATTENTION: Please call with any questions or concerns to [phone] and carefully listen to the prompts so that you are directed to the right person  All voicemails are confidential   Latrice Israel all requests for admission clinical reviews, approved or denied determinations and any other requests to dedicated fax number below belonging to the campus where the patient is receiving treatment   List of dedicated fax numbers for the Facilities:  [address] DENIALS (Administrative/Medical Necessity) [phone]   [address] (Maternity/NICU/Pediatrics) [phone]   Andrew Lucas [phone]   [facility] [phone]   Stephon Li [phone]   Guadalupe Ayala [phone]   [address] [phone]   [facility]  [phone]   [address] [facility] [address]  [address] [address] [address] [facility] [phone]

## 2020-01-09 NOTE — PROGRESS NOTES
PGY3 Post-Op Check Note    S: Feels well  Pain controlled  No N/V  Tolerating clears  Will bolus 1L LR for low urine output  O:   Vitals:    01/08/20 2137   BP: 159/92   Pulse: 88   Resp: 18   Temp: 98 8 °F (37 1 °C)   SpO2: 97%       I/O last 3 completed shifts: In: 3000 [I V :3000]  Out: 400 [Urine:100; Blood:300]  I/O this shift: In: 871 7 [P O :600;  I V :271 7]  Out: 225 [Urine:225]    PE:  AAOx3  NAD  Norm resp effort  RRR  Abd soft, tender over incisions, distended  Incisions c/d/i      Lab Results   Component Value Date    WBC 6 96 01/08/2020    HGB 15 1 01/08/2020    HCT 45 6 01/08/2020    MCV 90 01/08/2020     01/08/2020     Lab Results   Component Value Date    CALCIUM 9 4 12/09/2019    K 4 6 12/09/2019    CO2 28 12/09/2019     12/09/2019    BUN 12 12/09/2019    CREATININE 1 07 12/09/2019         A/P: 45 y o  M w/ hx diverticulitis s/p 1/8 lap, hand assisted sigmoidectomy  - Clears  - IVF  - Nunez  - PRN pain control  - monitor UOP  - PT/OT  - SQH/SCDs

## 2020-01-09 NOTE — SOCIAL WORK
Cm met with patient to review role of Cm  Patient presented as alter during conversation  Patient stated that he lives with wife Jeffrey Shukla, [phone] and children in a ranch style home with 1 step leading into the home  Patient stated that he was independent with ADLS PTA  Patient denied having any inpatient PT/OT, inpatient MH, substance abuse treatment or [place] [address] services  Cm informed that patient's pharmacy of choice is [facility] in [place]  Patient is affiliated with [address] [place] [address] physician group  CM reviewed d/c planning process including the following: identifying help at home, patient preference for d/c planning needs, Discharge Lounge, [facility] Meds to Bed program, availability of treatment team to discuss questions or concerns patient and/or family may have regarding understanding medications and recognizing signs and symptoms once discharged  CM also encouraged patient to follow up with all recommended appointments after discharge  Patient advised of importance for patient and family to participate in managing patients medical well being

## 2020-01-09 NOTE — PLAN OF CARE
Problem: PAIN - ADULT  Goal: Verbalizes/displays adequate comfort level or baseline comfort level  Description  Interventions:  - Encourage patient to monitor pain and request assistance  - Assess pain using appropriate pain scale  - Administer analgesics based on type and severity of pain and evaluate response  - Implement non-pharmacological measures as appropriate and evaluate response  - Consider cultural and social influences on pain and pain management  - Notify physician/advanced practitioner if interventions unsuccessful or patient reports new pain  Outcome: Progressing     Problem: INFECTION - ADULT  Goal: Absence or prevention of progression during hospitalization  Description  INTERVENTIONS:  - Assess and monitor for signs and symptoms of infection  - Monitor lab/diagnostic results  - Monitor all insertion sites, i e  indwelling lines, tubes, and drains  - Monitor endotracheal if appropriate and nasal secretions for changes in amount and color  - [place] appropriate cooling/warming therapies per order  - Administer medications as ordered  - Instruct and encourage patient and family to use good hand hygiene technique  - Identify and instruct in appropriate isolation precautions for identified infection/condition  Outcome: Progressing  Goal: Absence of fever/infection during neutropenic period  Description  INTERVENTIONS:  - Monitor WBC    Outcome: Progressing     Problem: SAFETY ADULT  Goal: Patient will remain free of falls  Description  INTERVENTIONS:  - Assess patient frequently for physical needs  -  Identify cognitive and physical deficits and behaviors that affect risk of falls    -  [place] fall precautions as indicated by assessment   - Educate patient/family on patient safety including physical limitations  - Instruct patient to call for assistance with activity based on assessment  - Modify environment to reduce risk of injury  - Consider OT/PT consult to assist with strengthening/mobility  Outcome: Progressing  Goal: Maintain or return to baseline ADL function  Description  INTERVENTIONS:  -  Assess patient's ability to carry out ADLs; assess patient's baseline for ADL function and identify physical deficits which impact ability to perform ADLs (bathing, care of mouth/teeth, toileting, grooming, dressing, etc )  - Assess/evaluate cause of self-care deficits   - Assess range of motion  - Assess patient's mobility; develop plan if impaired  - Assess patient's need for assistive devices and provide as appropriate  - Encourage maximum independence but intervene and supervise when necessary  - Involve family in performance of ADLs  - Assess for home care needs following discharge   - Consider OT consult to assist with ADL evaluation and planning for discharge  - Provide patient education as appropriate  Outcome: Progressing  Goal: Maintain or return mobility status to optimal level  Description  INTERVENTIONS:  - Assess patient's baseline mobility status (ambulation, transfers, stairs, etc )    - Identify cognitive and physical deficits and behaviors that affect mobility  - Identify mobility aids required to assist with transfers and/or ambulation (gait belt, sit-to-stand, lift, walker, cane, etc )  - [place] fall precautions as indicated by assessment  - Record patient progress and toleration of activity level on Mobility SBAR; progress patient to next Phase/Stage  - Instruct patient to call for assistance with activity based on assessment  - Consider rehabilitation consult to assist with strengthening/weightbearing, etc   Outcome: Progressing     Problem: DISCHARGE PLANNING  Goal: Discharge to home or other facility with appropriate resources  Description  INTERVENTIONS:  - Identify barriers to discharge w/patient and caregiver  - Arrange for needed discharge resources and transportation as appropriate  - Identify discharge learning needs (meds, wound care, etc )  - Arrange for interpretive services to assist at discharge as needed  - Refer to Case Management Department for coordinating discharge planning if the patient needs post-hospital services based on physician/advanced practitioner order or complex needs related to functional status, cognitive ability, or social support system  Outcome: Progressing     Problem: Knowledge Deficit  Goal: Patient/family/caregiver demonstrates understanding of disease process, treatment plan, medications, and discharge instructions  Description  Complete learning assessment and assess knowledge base    Interventions:  - Provide teaching at level of understanding  - Provide teaching via preferred learning methods  Outcome: Progressing

## 2020-01-10 LAB
ANION GAP SERPL CALCULATED.3IONS-SCNC: 2 MMOL/L (ref 4–13)
ANION GAP SERPL CALCULATED.3IONS-SCNC: 4 MMOL/L (ref 4–13)
BASOPHILS # BLD AUTO: 0.05 THOUSANDS/ΜL (ref 0–0.1)
BASOPHILS NFR BLD AUTO: 0 % (ref 0–1)
BUN SERPL-MCNC: 7 MG/DL (ref 5–25)
BUN SERPL-MCNC: 7 MG/DL (ref 5–25)
CALCIUM SERPL-MCNC: 8.7 MG/DL (ref 8.3–10.1)
CALCIUM SERPL-MCNC: 8.8 MG/DL (ref 8.3–10.1)
CHLORIDE SERPL-SCNC: 97 MMOL/L (ref 100–108)
CHLORIDE SERPL-SCNC: 98 MMOL/L (ref 100–108)
CO2 SERPL-SCNC: 28 MMOL/L (ref 21–32)
CO2 SERPL-SCNC: 29 MMOL/L (ref 21–32)
CREAT SERPL-MCNC: 0.91 MG/DL (ref 0.6–1.3)
CREAT SERPL-MCNC: 0.98 MG/DL (ref 0.6–1.3)
EOSINOPHIL # BLD AUTO: 0.01 THOUSAND/ΜL (ref 0–0.61)
EOSINOPHIL NFR BLD AUTO: 0 % (ref 0–6)
ERYTHROCYTE [DISTWIDTH] IN BLOOD BY AUTOMATED COUNT: 13.4 % (ref 11.6–15.1)
GFR SERPL CREATININE-BSD FRML MDRD: 104 ML/MIN/1.73SQ M
GFR SERPL CREATININE-BSD FRML MDRD: 95 ML/MIN/1.73SQ M
GLUCOSE SERPL-MCNC: 113 MG/DL (ref 65–140)
GLUCOSE SERPL-MCNC: 115 MG/DL (ref 65–140)
HCT VFR BLD AUTO: 38 % (ref 36.5–49.3)
HGB BLD-MCNC: 12.5 G/DL (ref 12–17)
IMM GRANULOCYTES # BLD AUTO: 0.09 THOUSAND/UL (ref 0–0.2)
IMM GRANULOCYTES NFR BLD AUTO: 1 % (ref 0–2)
LYMPHOCYTES # BLD AUTO: 1.88 THOUSANDS/ΜL (ref 0.6–4.47)
LYMPHOCYTES NFR BLD AUTO: 12 % (ref 14–44)
MCH RBC QN AUTO: 30.3 PG (ref 26.8–34.3)
MCHC RBC AUTO-ENTMCNC: 32.9 G/DL (ref 31.4–37.4)
MCV RBC AUTO: 92 FL (ref 82–98)
MONOCYTES # BLD AUTO: 0.98 THOUSAND/ΜL (ref 0.17–1.22)
MONOCYTES NFR BLD AUTO: 6 % (ref 4–12)
NEUTROPHILS # BLD AUTO: 12.46 THOUSANDS/ΜL (ref 1.85–7.62)
NEUTS SEG NFR BLD AUTO: 81 % (ref 43–75)
NRBC BLD AUTO-RTO: 0 /100 WBCS
PLATELET # BLD AUTO: 226 THOUSANDS/UL (ref 149–390)
PMV BLD AUTO: 10 FL (ref 8.9–12.7)
POTASSIUM SERPL-SCNC: 3.8 MMOL/L (ref 3.5–5.3)
POTASSIUM SERPL-SCNC: 4.3 MMOL/L (ref 3.5–5.3)
RBC # BLD AUTO: 4.12 MILLION/UL (ref 3.88–5.62)
SODIUM SERPL-SCNC: 129 MMOL/L (ref 136–145)
SODIUM SERPL-SCNC: 129 MMOL/L (ref 136–145)
WBC # BLD AUTO: 15.47 THOUSAND/UL (ref 4.31–10.16)

## 2020-01-10 PROCEDURE — C9113 INJ PANTOPRAZOLE SODIUM, VIA: HCPCS | Performed by: PHYSICIAN ASSISTANT

## 2020-01-10 PROCEDURE — 85025 COMPLETE CBC W/AUTO DIFF WBC: CPT | Performed by: PHYSICIAN ASSISTANT

## 2020-01-10 PROCEDURE — 80048 BASIC METABOLIC PNL TOTAL CA: CPT | Performed by: PHYSICIAN ASSISTANT

## 2020-01-10 RX ORDER — SODIUM CHLORIDE 9 MG/ML
125 INJECTION, SOLUTION INTRAVENOUS CONTINUOUS
Status: DISCONTINUED | OUTPATIENT
Start: 2020-01-10 | End: 2020-01-12

## 2020-01-10 RX ORDER — METOPROLOL TARTRATE 5 MG/5ML
2.5 INJECTION INTRAVENOUS EVERY 6 HOURS SCHEDULED
Status: DISCONTINUED | OUTPATIENT
Start: 2020-01-10 | End: 2020-01-14 | Stop reason: HOSPADM

## 2020-01-10 RX ORDER — NICOTINE 21 MG/24HR
14 PATCH, TRANSDERMAL 24 HOURS TRANSDERMAL DAILY
Status: DISCONTINUED | OUTPATIENT
Start: 2020-01-10 | End: 2020-01-14 | Stop reason: HOSPADM

## 2020-01-10 RX ORDER — PANTOPRAZOLE SODIUM 40 MG/1
40 INJECTION, POWDER, FOR SOLUTION INTRAVENOUS
Status: DISCONTINUED | OUTPATIENT
Start: 2020-01-10 | End: 2020-01-14 | Stop reason: HOSPADM

## 2020-01-10 RX ORDER — HYDROMORPHONE HCL/PF 1 MG/ML
0.5 SYRINGE (ML) INJECTION
Status: DISCONTINUED | OUTPATIENT
Start: 2020-01-10 | End: 2020-01-13

## 2020-01-10 RX ORDER — LIDOCAINE HYDROCHLORIDE 20 MG/ML
1 JELLY TOPICAL ONCE
Status: COMPLETED | OUTPATIENT
Start: 2020-01-10 | End: 2020-01-10

## 2020-01-10 RX ORDER — POTASSIUM CHLORIDE 14.9 MG/ML
20 INJECTION INTRAVENOUS ONCE
Status: COMPLETED | OUTPATIENT
Start: 2020-01-10 | End: 2020-01-10

## 2020-01-10 RX ORDER — METOPROLOL TARTRATE 5 MG/5ML
5 INJECTION INTRAVENOUS EVERY 12 HOURS
Status: DISCONTINUED | OUTPATIENT
Start: 2020-01-10 | End: 2020-01-10

## 2020-01-10 RX ORDER — SODIUM CHLORIDE 9 MG/ML
125 INJECTION, SOLUTION INTRAVENOUS CONTINUOUS
Status: DISCONTINUED | OUTPATIENT
Start: 2020-01-11 | End: 2020-01-10

## 2020-01-10 RX ADMIN — LIDOCAINE HYDROCHLORIDE 1 APPLICATION: 20 JELLY TOPICAL at 13:27

## 2020-01-10 RX ADMIN — Medication 1 SPRAY: at 12:59

## 2020-01-10 RX ADMIN — BUDESONIDE AND FORMOTEROL FUMARATE DIHYDRATE 2 PUFF: 80; 4.5 AEROSOL RESPIRATORY (INHALATION) at 08:34

## 2020-01-10 RX ADMIN — HEPARIN SODIUM 5000 UNITS: 5000 INJECTION INTRAVENOUS; SUBCUTANEOUS at 13:00

## 2020-01-10 RX ADMIN — METOPROLOL TARTRATE 2.5 MG: 5 INJECTION, SOLUTION INTRAVENOUS at 12:46

## 2020-01-10 RX ADMIN — METOPROLOL TARTRATE 2.5 MG: 5 INJECTION, SOLUTION INTRAVENOUS at 17:16

## 2020-01-10 RX ADMIN — DEXTROSE, SODIUM CHLORIDE, AND POTASSIUM CHLORIDE 100 ML/HR: 5; .45; .15 INJECTION INTRAVENOUS at 03:14

## 2020-01-10 RX ADMIN — LOSARTAN POTASSIUM 50 MG: 50 TABLET, FILM COATED ORAL at 08:34

## 2020-01-10 RX ADMIN — SODIUM CHLORIDE 125 ML/HR: 0.9 INJECTION, SOLUTION INTRAVENOUS at 21:32

## 2020-01-10 RX ADMIN — BUDESONIDE AND FORMOTEROL FUMARATE DIHYDRATE 2 PUFF: 80; 4.5 AEROSOL RESPIRATORY (INHALATION) at 17:16

## 2020-01-10 RX ADMIN — HYDROMORPHONE HYDROCHLORIDE 0.5 MG: 1 INJECTION, SOLUTION INTRAMUSCULAR; INTRAVENOUS; SUBCUTANEOUS at 13:00

## 2020-01-10 RX ADMIN — HEPARIN SODIUM 5000 UNITS: 5000 INJECTION INTRAVENOUS; SUBCUTANEOUS at 21:32

## 2020-01-10 RX ADMIN — PANTOPRAZOLE SODIUM 40 MG: 40 INJECTION, POWDER, FOR SOLUTION INTRAVENOUS at 12:45

## 2020-01-10 RX ADMIN — TOPICAL ANESTHETIC 2 SPRAY: 200 SPRAY DENTAL; PERIODONTAL at 13:26

## 2020-01-10 RX ADMIN — SODIUM CHLORIDE 125 ML/HR: 0.9 INJECTION, SOLUTION INTRAVENOUS at 13:40

## 2020-01-10 RX ADMIN — HEPARIN SODIUM 5000 UNITS: 5000 INJECTION INTRAVENOUS; SUBCUTANEOUS at 05:17

## 2020-01-10 RX ADMIN — NICOTINE 14 MG: 14 PATCH TRANSDERMAL at 14:12

## 2020-01-10 RX ADMIN — POTASSIUM CHLORIDE 20 MEQ: 14.9 INJECTION, SOLUTION INTRAVENOUS at 11:30

## 2020-01-10 NOTE — PLAN OF CARE
Problem: PAIN - ADULT  Goal: Verbalizes/displays adequate comfort level or baseline comfort level  Description  Interventions:  - Encourage patient to monitor pain and request assistance  - Assess pain using appropriate pain scale  - Administer analgesics based on type and severity of pain and evaluate response  - Implement non-pharmacological measures as appropriate and evaluate response  - Consider cultural and social influences on pain and pain management  - Notify physician/advanced practitioner if interventions unsuccessful or patient reports new pain  Outcome: Progressing     Problem: INFECTION - ADULT  Goal: Absence or prevention of progression during hospitalization  Description  INTERVENTIONS:  - Assess and monitor for signs and symptoms of infection  - Monitor lab/diagnostic results  - Monitor all insertion sites, i e  indwelling lines, tubes, and drains  - Monitor endotracheal if appropriate and nasal secretions for changes in amount and color  - [place] appropriate cooling/warming therapies per order  - Administer medications as ordered  - Instruct and encourage patient and family to use good hand hygiene technique  - Identify and instruct in appropriate isolation precautions for identified infection/condition  Outcome: Progressing  Goal: Absence of fever/infection during neutropenic period  Description  INTERVENTIONS:  - Monitor WBC    Outcome: Progressing     Problem: SAFETY ADULT  Goal: Patient will remain free of falls  Description  INTERVENTIONS:  - Assess patient frequently for physical needs  -  Identify cognitive and physical deficits and behaviors that affect risk of falls    -  [place] fall precautions as indicated by assessment   - Educate patient/family on patient safety including physical limitations  - Instruct patient to call for assistance with activity based on assessment  - Modify environment to reduce risk of injury  - Consider OT/PT consult to assist with strengthening/mobility  Outcome: Progressing  Goal: Maintain or return to baseline ADL function  Description  INTERVENTIONS:  -  Assess patient's ability to carry out ADLs; assess patient's baseline for ADL function and identify physical deficits which impact ability to perform ADLs (bathing, care of mouth/teeth, toileting, grooming, dressing, etc )  - Assess/evaluate cause of self-care deficits   - Assess range of motion  - Assess patient's mobility; develop plan if impaired  - Assess patient's need for assistive devices and provide as appropriate  - Encourage maximum independence but intervene and supervise when necessary  - Involve family in performance of ADLs  - Assess for home care needs following discharge   - Consider OT consult to assist with ADL evaluation and planning for discharge  - Provide patient education as appropriate  Outcome: Progressing  Goal: Maintain or return mobility status to optimal level  Description  INTERVENTIONS:  - Assess patient's baseline mobility status (ambulation, transfers, stairs, etc )    - Identify cognitive and physical deficits and behaviors that affect mobility  - Identify mobility aids required to assist with transfers and/or ambulation (gait belt, sit-to-stand, lift, walker, cane, etc )  - [place] fall precautions as indicated by assessment  - Record patient progress and toleration of activity level on Mobility SBAR; progress patient to next Phase/Stage  - Instruct patient to call for assistance with activity based on assessment  - Consider rehabilitation consult to assist with strengthening/weightbearing, etc   Outcome: Progressing     Problem: DISCHARGE PLANNING  Goal: Discharge to home or other facility with appropriate resources  Description  INTERVENTIONS:  - Identify barriers to discharge w/patient and caregiver  - Arrange for needed discharge resources and transportation as appropriate  - Identify discharge learning needs (meds, wound care, etc )  - Arrange for interpretive services to assist at discharge as needed  - Refer to Case Management Department for coordinating discharge planning if the patient needs post-hospital services based on physician/advanced practitioner order or complex needs related to functional status, cognitive ability, or social support system  Outcome: Progressing     Problem: Knowledge Deficit  Goal: Patient/family/caregiver demonstrates understanding of disease process, treatment plan, medications, and discharge instructions  Description  Complete learning assessment and assess knowledge base    Interventions:  - Provide teaching at level of understanding  - Provide teaching via preferred learning methods  Outcome: Progressing

## 2020-01-10 NOTE — PLAN OF CARE
Problem: Nutrition/Hydration-ADULT  Goal: Nutrient/Hydration intake appropriate for improving, restoring or maintaining nutritional needs  Description  Monitor and assess patient's nutrition/hydration status for malnutrition  Collaborate with interdisciplinary team and initiate plan and interventions as ordered  Monitor patient's weight and dietary intake as ordered or per policy  Utilize nutrition screening tool and intervene as necessary  Determine patient's food preferences and provide high-protein, high-caloric foods as appropriate       INTERVENTIONS:  - Monitor oral intake, urinary output, labs, and treatment plans  - Assess nutrition and hydration status and recommend course of action  - Evaluate amount of meals eaten  - Assist patient with eating if necessary   - Allow adequate time for meals  - Recommend/ encourage appropriate diets, oral nutritional supplements, and vitamin/mineral supplements  - Order, calculate, and assess calorie counts as needed  - Recommend, monitor, and adjust tube feedings and TPN/PPN based on assessed needs  - Assess need for intravenous fluids  - Provide specific nutrition/hydration education as appropriate  - Include patient/family/caregiver in decisions related to nutrition  Outcome: Not Progressing   Patient remains NPO, abdominal distention

## 2020-01-10 NOTE — PROGRESS NOTES
Progress Note - Colorectal Surgery   Marylou Pastor 45 y o  male MRN: [id number]  Unit/Bed#: [facility] 626-01 Encounter: [id number]    Assessment:  46 yo M w/ hx diverticulitis s/p 1/8 lap, hand assisted sigmoidectomy with post op ileus - increased distension and discomfort this AM  No flatus or BM    Plan:  Backed down to NPO w/ sips  If emesis, place NGT  Continue OOB, ambulation  PRN pain control    Subjective/Objective   Chief Complaint:     Subjective: Feels distended, denies nausea  States he ambulated multiple times yesterday  Does not "think" he drank much however charted >1000 ml PO intake  No flatus or BM    Objective:     Blood pressure 125/76, pulse 100, temperature 100 °F (37 8 °C), temperature source Oral, resp  rate 22, height 5' 6 5" (1 689 m), weight 93 9 kg (207 lb), SpO2 93 %  ,Body mass index is 32 91 kg/m²        Intake/Output Summary (Last 24 hours) at 1/10/2020 0612  Last data filed at 1/10/2020 0313  Gross per 24 hour   Intake 3958 7 ml   Output 2150 ml   Net 1808 7 ml       Invasive Devices     Peripheral Intravenous Line            Peripheral IV 01/08/20 Left Hand 1 day              Physical Exam:   Gen: A&O, NAD  Cardio: RRR  Lungs: CTAB  Abd: Distended, tympanic, tender        Lab, Imaging and other studies:CBC: No results found for: WBC, HGB, HCT, MCV, PLT, ADJUSTEDWBC, MCH, MCHC, RDW, MPV, NRBC, CMP: No results found for: SODIUM, K, CL, CO2, ANIONGAP, BUN, CREATININE, GLUCOSE, CALCIUM, AST, ALT, ALKPHOS, PROT, BILITOT, EGFR, Coagulation: No results found for: PT, INR, APTT  VTE Pharmacologic Prophylaxis: Heparin  VTE Mechanical Prophylaxis: sequential compression device

## 2020-01-10 NOTE — SOCIAL WORK
CM will continue to follow patient for discharge planning needs  Due to no flatus or BM, patient may not be ready for discharge today  No anticipated CM needs

## 2020-01-10 NOTE — OCCUPATIONAL THERAPY NOTE
OCCUPATIONAL THERAPY SCREEN:    ORDERS RECEIVED  CHART REVIEW COMPLETED  PER MEDICAL TEAM, PT FUNCTIONING AT INDEPENDENT LEVEL WITH NO ACUTE CARE OT NEEDS  D/C OT       Chisé Diacik, MOT, OTR/L

## 2020-01-11 LAB
ANION GAP SERPL CALCULATED.3IONS-SCNC: 6 MMOL/L (ref 4–13)
BASOPHILS # BLD AUTO: 0.06 THOUSANDS/ΜL (ref 0–0.1)
BASOPHILS NFR BLD AUTO: 0 % (ref 0–1)
BUN SERPL-MCNC: 9 MG/DL (ref 5–25)
CALCIUM SERPL-MCNC: 8.9 MG/DL (ref 8.3–10.1)
CHLORIDE SERPL-SCNC: 100 MMOL/L (ref 100–108)
CO2 SERPL-SCNC: 27 MMOL/L (ref 21–32)
CREAT SERPL-MCNC: 0.72 MG/DL (ref 0.6–1.3)
EOSINOPHIL # BLD AUTO: 0.27 THOUSAND/ΜL (ref 0–0.61)
EOSINOPHIL NFR BLD AUTO: 2 % (ref 0–6)
ERYTHROCYTE [DISTWIDTH] IN BLOOD BY AUTOMATED COUNT: 13.4 % (ref 11.6–15.1)
GFR SERPL CREATININE-BSD FRML MDRD: 115 ML/MIN/1.73SQ M
GLUCOSE SERPL-MCNC: 80 MG/DL (ref 65–140)
HCT VFR BLD AUTO: 39.2 % (ref 36.5–49.3)
HGB BLD-MCNC: 12.9 G/DL (ref 12–17)
IMM GRANULOCYTES # BLD AUTO: 0.06 THOUSAND/UL (ref 0–0.2)
IMM GRANULOCYTES NFR BLD AUTO: 0 % (ref 0–2)
LYMPHOCYTES # BLD AUTO: 2.42 THOUSANDS/ΜL (ref 0.6–4.47)
LYMPHOCYTES NFR BLD AUTO: 16 % (ref 14–44)
MCH RBC QN AUTO: 29.7 PG (ref 26.8–34.3)
MCHC RBC AUTO-ENTMCNC: 32.9 G/DL (ref 31.4–37.4)
MCV RBC AUTO: 90 FL (ref 82–98)
MONOCYTES # BLD AUTO: 0.77 THOUSAND/ΜL (ref 0.17–1.22)
MONOCYTES NFR BLD AUTO: 5 % (ref 4–12)
NEUTROPHILS # BLD AUTO: 11.23 THOUSANDS/ΜL (ref 1.85–7.62)
NEUTS SEG NFR BLD AUTO: 77 % (ref 43–75)
NRBC BLD AUTO-RTO: 0 /100 WBCS
PLATELET # BLD AUTO: 220 THOUSANDS/UL (ref 149–390)
PMV BLD AUTO: 10 FL (ref 8.9–12.7)
POTASSIUM SERPL-SCNC: 4 MMOL/L (ref 3.5–5.3)
RBC # BLD AUTO: 4.34 MILLION/UL (ref 3.88–5.62)
SODIUM SERPL-SCNC: 133 MMOL/L (ref 136–145)
WBC # BLD AUTO: 14.81 THOUSAND/UL (ref 4.31–10.16)

## 2020-01-11 PROCEDURE — 85025 COMPLETE CBC W/AUTO DIFF WBC: CPT | Performed by: PHYSICIAN ASSISTANT

## 2020-01-11 PROCEDURE — 80048 BASIC METABOLIC PNL TOTAL CA: CPT | Performed by: PHYSICIAN ASSISTANT

## 2020-01-11 PROCEDURE — C9113 INJ PANTOPRAZOLE SODIUM, VIA: HCPCS | Performed by: PHYSICIAN ASSISTANT

## 2020-01-11 RX ADMIN — NICOTINE 14 MG: 14 PATCH TRANSDERMAL at 09:46

## 2020-01-11 RX ADMIN — METOPROLOL TARTRATE 2.5 MG: 5 INJECTION, SOLUTION INTRAVENOUS at 05:50

## 2020-01-11 RX ADMIN — BUDESONIDE AND FORMOTEROL FUMARATE DIHYDRATE 2 PUFF: 80; 4.5 AEROSOL RESPIRATORY (INHALATION) at 09:45

## 2020-01-11 RX ADMIN — SODIUM CHLORIDE 125 ML/HR: 0.9 INJECTION, SOLUTION INTRAVENOUS at 05:58

## 2020-01-11 RX ADMIN — METOPROLOL TARTRATE 2.5 MG: 5 INJECTION, SOLUTION INTRAVENOUS at 13:07

## 2020-01-11 RX ADMIN — METOPROLOL TARTRATE 2.5 MG: 5 INJECTION, SOLUTION INTRAVENOUS at 18:10

## 2020-01-11 RX ADMIN — PANTOPRAZOLE SODIUM 40 MG: 40 INJECTION, POWDER, FOR SOLUTION INTRAVENOUS at 09:45

## 2020-01-11 RX ADMIN — BUDESONIDE AND FORMOTEROL FUMARATE DIHYDRATE 2 PUFF: 80; 4.5 AEROSOL RESPIRATORY (INHALATION) at 18:10

## 2020-01-11 RX ADMIN — HEPARIN SODIUM 5000 UNITS: 5000 INJECTION INTRAVENOUS; SUBCUTANEOUS at 21:26

## 2020-01-11 RX ADMIN — SODIUM CHLORIDE 125 ML/HR: 0.9 INJECTION, SOLUTION INTRAVENOUS at 16:04

## 2020-01-11 RX ADMIN — METOPROLOL TARTRATE 2.5 MG: 5 INJECTION, SOLUTION INTRAVENOUS at 00:35

## 2020-01-11 RX ADMIN — Medication: at 09:08

## 2020-01-11 RX ADMIN — HEPARIN SODIUM 5000 UNITS: 5000 INJECTION INTRAVENOUS; SUBCUTANEOUS at 05:49

## 2020-01-11 RX ADMIN — HEPARIN SODIUM 5000 UNITS: 5000 INJECTION INTRAVENOUS; SUBCUTANEOUS at 13:10

## 2020-01-11 NOTE — PROGRESS NOTES
Progress Note - Colorectal Surgery   Fay Blevins 45 y o  male MRN: [id number]  Unit/Bed#: [facility] 626-01 Encounter: [id number]    Assessment:  44 yo M w/ hx diverticulitis s/p 1/8 lap, hand assisted sigmoidectomy with post op ileus - increased distension and discomfort, NGT was placed with 1 5L output    Plan:  Continue NGT until return of bowel function  F/u am labs, replace eletrolytes, trend Na  Continue OOB, ambulation  PRN pain control, PCA    Subjective/Objective   Chief Complaint:     Subjective: Feels distended, denies nausea  States he ambulated multiple times yesterday  -F/-BM, c/o scrotal edema and discomfort from NGT      Objective:     Blood pressure 135/77, pulse 95, temperature 98 9 °F (37 2 °C), temperature source Oral, resp  rate 18, height 5' 6 5" (1 689 m), weight 93 9 kg (207 lb), SpO2 94 %  ,Body mass index is 32 91 kg/m²        Intake/Output Summary (Last 24 hours) at 1/11/2020 0731  Last data filed at 1/11/2020 9999  Gross per 24 hour   Intake 1056 33 ml   Output 2375 ml   Net -1318 67 ml       Invasive Devices     Peripheral Intravenous Line            Peripheral IV 01/08/20 Left Hand 2 days          Drain            NG/OG/Enteral Tube Nasogastric 18 Fr Right nares less than 1 day              Physical Exam:   Gen: A&O, NAD  Cardio: RRR  Lungs: CTAB  Abd: Distended, soft, TTP around incisions        Lab, Imaging and other studies:CBC: No results found for: WBC, HGB, HCT, MCV, PLT, ADJUSTEDWBC, MCH, MCHC, RDW, MPV, NRBC, CMP:   Lab Results   Component Value Date    SODIUM 129 (L) 01/10/2020    K 4 3 01/10/2020    CL 98 (L) 01/10/2020    CO2 29 01/10/2020    BUN 7 01/10/2020    CREATININE 0 91 01/10/2020    CALCIUM 8 8 01/10/2020    EGFR 104 01/10/2020   , Coagulation: No results found for: PT, INR, APTT  VTE Pharmacologic Prophylaxis: Heparin  VTE Mechanical Prophylaxis: sequential compression device

## 2020-01-12 LAB
ANION GAP SERPL CALCULATED.3IONS-SCNC: 4 MMOL/L (ref 4–13)
BASOPHILS # BLD AUTO: 0.04 THOUSANDS/ΜL (ref 0–0.1)
BASOPHILS NFR BLD AUTO: 0 % (ref 0–1)
BUN SERPL-MCNC: 9 MG/DL (ref 5–25)
CALCIUM SERPL-MCNC: 8.8 MG/DL (ref 8.3–10.1)
CHLORIDE SERPL-SCNC: 104 MMOL/L (ref 100–108)
CO2 SERPL-SCNC: 28 MMOL/L (ref 21–32)
CREAT SERPL-MCNC: 0.71 MG/DL (ref 0.6–1.3)
EOSINOPHIL # BLD AUTO: 0.28 THOUSAND/ΜL (ref 0–0.61)
EOSINOPHIL NFR BLD AUTO: 2 % (ref 0–6)
ERYTHROCYTE [DISTWIDTH] IN BLOOD BY AUTOMATED COUNT: 13.2 % (ref 11.6–15.1)
GFR SERPL CREATININE-BSD FRML MDRD: 116 ML/MIN/1.73SQ M
GLUCOSE SERPL-MCNC: 81 MG/DL (ref 65–140)
HCT VFR BLD AUTO: 35 % (ref 36.5–49.3)
HGB BLD-MCNC: 11.5 G/DL (ref 12–17)
IMM GRANULOCYTES # BLD AUTO: 0.09 THOUSAND/UL (ref 0–0.2)
IMM GRANULOCYTES NFR BLD AUTO: 1 % (ref 0–2)
LYMPHOCYTES # BLD AUTO: 2.18 THOUSANDS/ΜL (ref 0.6–4.47)
LYMPHOCYTES NFR BLD AUTO: 18 % (ref 14–44)
MCH RBC QN AUTO: 30 PG (ref 26.8–34.3)
MCHC RBC AUTO-ENTMCNC: 32.9 G/DL (ref 31.4–37.4)
MCV RBC AUTO: 91 FL (ref 82–98)
MONOCYTES # BLD AUTO: 0.73 THOUSAND/ΜL (ref 0.17–1.22)
MONOCYTES NFR BLD AUTO: 6 % (ref 4–12)
NEUTROPHILS # BLD AUTO: 9.07 THOUSANDS/ΜL (ref 1.85–7.62)
NEUTS SEG NFR BLD AUTO: 73 % (ref 43–75)
NRBC BLD AUTO-RTO: 0 /100 WBCS
PLATELET # BLD AUTO: 242 THOUSANDS/UL (ref 149–390)
PMV BLD AUTO: 9.9 FL (ref 8.9–12.7)
POTASSIUM SERPL-SCNC: 4 MMOL/L (ref 3.5–5.3)
RBC # BLD AUTO: 3.83 MILLION/UL (ref 3.88–5.62)
SODIUM SERPL-SCNC: 136 MMOL/L (ref 136–145)
WBC # BLD AUTO: 12.39 THOUSAND/UL (ref 4.31–10.16)

## 2020-01-12 PROCEDURE — C9113 INJ PANTOPRAZOLE SODIUM, VIA: HCPCS | Performed by: PHYSICIAN ASSISTANT

## 2020-01-12 PROCEDURE — 80048 BASIC METABOLIC PNL TOTAL CA: CPT | Performed by: SURGERY

## 2020-01-12 PROCEDURE — 85025 COMPLETE CBC W/AUTO DIFF WBC: CPT | Performed by: SURGERY

## 2020-01-12 RX ORDER — DEXTROSE, SODIUM CHLORIDE, AND POTASSIUM CHLORIDE 5; .45; .15 G/100ML; G/100ML; G/100ML
100 INJECTION INTRAVENOUS CONTINUOUS
Status: DISCONTINUED | OUTPATIENT
Start: 2020-01-12 | End: 2020-01-13

## 2020-01-12 RX ADMIN — METOPROLOL TARTRATE 2.5 MG: 5 INJECTION, SOLUTION INTRAVENOUS at 05:19

## 2020-01-12 RX ADMIN — METOPROLOL TARTRATE 2.5 MG: 5 INJECTION, SOLUTION INTRAVENOUS at 17:49

## 2020-01-12 RX ADMIN — NICOTINE 14 MG: 14 PATCH TRANSDERMAL at 09:33

## 2020-01-12 RX ADMIN — METOPROLOL TARTRATE 2.5 MG: 5 INJECTION, SOLUTION INTRAVENOUS at 13:39

## 2020-01-12 RX ADMIN — DEXTROSE, SODIUM CHLORIDE, AND POTASSIUM CHLORIDE 100 ML/HR: 5; .45; .15 INJECTION INTRAVENOUS at 09:38

## 2020-01-12 RX ADMIN — HEPARIN SODIUM 5000 UNITS: 5000 INJECTION INTRAVENOUS; SUBCUTANEOUS at 13:39

## 2020-01-12 RX ADMIN — BUDESONIDE AND FORMOTEROL FUMARATE DIHYDRATE 2 PUFF: 80; 4.5 AEROSOL RESPIRATORY (INHALATION) at 09:36

## 2020-01-12 RX ADMIN — METOPROLOL TARTRATE 2.5 MG: 5 INJECTION, SOLUTION INTRAVENOUS at 00:43

## 2020-01-12 RX ADMIN — HEPARIN SODIUM 5000 UNITS: 5000 INJECTION INTRAVENOUS; SUBCUTANEOUS at 21:31

## 2020-01-12 RX ADMIN — HEPARIN SODIUM 5000 UNITS: 5000 INJECTION INTRAVENOUS; SUBCUTANEOUS at 05:19

## 2020-01-12 RX ADMIN — ALBUTEROL SULFATE 2 PUFF: 90 AEROSOL, METERED RESPIRATORY (INHALATION) at 06:03

## 2020-01-12 RX ADMIN — DEXTROSE, SODIUM CHLORIDE, AND POTASSIUM CHLORIDE 100 ML/HR: 5; .45; .15 INJECTION INTRAVENOUS at 21:32

## 2020-01-12 RX ADMIN — PANTOPRAZOLE SODIUM 40 MG: 40 INJECTION, POWDER, FOR SOLUTION INTRAVENOUS at 09:32

## 2020-01-12 RX ADMIN — SODIUM CHLORIDE 125 ML/HR: 0.9 INJECTION, SOLUTION INTRAVENOUS at 00:12

## 2020-01-12 RX ADMIN — BUDESONIDE AND FORMOTEROL FUMARATE DIHYDRATE 2 PUFF: 80; 4.5 AEROSOL RESPIRATORY (INHALATION) at 17:50

## 2020-01-12 NOTE — PLAN OF CARE
Problem: PAIN - ADULT  Goal: Verbalizes/displays adequate comfort level or baseline comfort level  Description  Interventions:  - Encourage patient to monitor pain and request assistance  - Assess pain using appropriate pain scale  - Administer analgesics based on type and severity of pain and evaluate response  - Implement non-pharmacological measures as appropriate and evaluate response  - Consider cultural and social influences on pain and pain management  - Notify physician/advanced practitioner if interventions unsuccessful or patient reports new pain  Outcome: Progressing     Problem: INFECTION - ADULT  Goal: Absence or prevention of progression during hospitalization  Description  INTERVENTIONS:  - Assess and monitor for signs and symptoms of infection  - Monitor lab/diagnostic results  - Monitor all insertion sites, i e  indwelling lines, tubes, and drains  - Monitor endotracheal if appropriate and nasal secretions for changes in amount and color  - [place] appropriate cooling/warming therapies per order  - Administer medications as ordered  - Instruct and encourage patient and family to use good hand hygiene technique  - Identify and instruct in appropriate isolation precautions for identified infection/condition  Outcome: Progressing  Goal: Absence of fever/infection during neutropenic period  Description  INTERVENTIONS:  - Monitor WBC    Outcome: Progressing     Problem: SAFETY ADULT  Goal: Patient will remain free of falls  Description  INTERVENTIONS:  - Assess patient frequently for physical needs  -  Identify cognitive and physical deficits and behaviors that affect risk of falls    -  [place] fall precautions as indicated by assessment   - Educate patient/family on patient safety including physical limitations  - Instruct patient to call for assistance with activity based on assessment  - Modify environment to reduce risk of injury  - Consider OT/PT consult to assist with strengthening/mobility  Outcome: Progressing  Goal: Maintain or return to baseline ADL function  Description  INTERVENTIONS:  -  Assess patient's ability to carry out ADLs; assess patient's baseline for ADL function and identify physical deficits which impact ability to perform ADLs (bathing, care of mouth/teeth, toileting, grooming, dressing, etc )  - Assess/evaluate cause of self-care deficits   - Assess range of motion  - Assess patient's mobility; develop plan if impaired  - Assess patient's need for assistive devices and provide as appropriate  - Encourage maximum independence but intervene and supervise when necessary  - Involve family in performance of ADLs  - Assess for home care needs following discharge   - Consider OT consult to assist with ADL evaluation and planning for discharge  - Provide patient education as appropriate  Outcome: Progressing  Goal: Maintain or return mobility status to optimal level  Description  INTERVENTIONS:  - Assess patient's baseline mobility status (ambulation, transfers, stairs, etc )    - Identify cognitive and physical deficits and behaviors that affect mobility  - Identify mobility aids required to assist with transfers and/or ambulation (gait belt, sit-to-stand, lift, walker, cane, etc )  - [place] fall precautions as indicated by assessment  - Record patient progress and toleration of activity level on Mobility SBAR; progress patient to next Phase/Stage  - Instruct patient to call for assistance with activity based on assessment  - Consider rehabilitation consult to assist with strengthening/weightbearing, etc   Outcome: Progressing     Problem: DISCHARGE PLANNING  Goal: Discharge to home or other facility with appropriate resources  Description  INTERVENTIONS:  - Identify barriers to discharge w/patient and caregiver  - Arrange for needed discharge resources and transportation as appropriate  - Identify discharge learning needs (meds, wound care, etc )  - Arrange for interpretive services to assist at discharge as needed  - Refer to Case Management Department for coordinating discharge planning if the patient needs post-hospital services based on physician/advanced practitioner order or complex needs related to functional status, cognitive ability, or social support system  Outcome: Progressing     Problem: Knowledge Deficit  Goal: Patient/family/caregiver demonstrates understanding of disease process, treatment plan, medications, and discharge instructions  Description  Complete learning assessment and assess knowledge base  Interventions:  - Provide teaching at level of understanding  - Provide teaching via preferred learning methods  Outcome: Progressing     Problem: Nutrition/Hydration-ADULT  Goal: Nutrient/Hydration intake appropriate for improving, restoring or maintaining nutritional needs  Description  Monitor and assess patient's nutrition/hydration status for malnutrition  Collaborate with interdisciplinary team and initiate plan and interventions as ordered  Monitor patient's weight and dietary intake as ordered or per policy  Utilize nutrition screening tool and intervene as necessary  Determine patient's food preferences and provide high-protein, high-caloric foods as appropriate       INTERVENTIONS:  - Monitor oral intake, urinary output, labs, and treatment plans  - Assess nutrition and hydration status and recommend course of action  - Evaluate amount of meals eaten  - Assist patient with eating if necessary   - Allow adequate time for meals  - Recommend/ encourage appropriate diets, oral nutritional supplements, and vitamin/mineral supplements  - Order, calculate, and assess calorie counts as needed  - Recommend, monitor, and adjust tube feedings and TPN/PPN based on assessed needs  - Assess need for intravenous fluids  - Provide specific nutrition/hydration education as appropriate  - Include patient/family/caregiver in decisions related to nutrition  Outcome: Progressing

## 2020-01-12 NOTE — PROGRESS NOTES
Progress Note - Colorectal Surgery   Agnieszka Thompson 45 y o  male MRN: [id number]  Unit/Bed#: [facility] 626-01 Encounter: [id number]    Assessment:  44 y/o M w/ hx of diverticulitis, s/p laparoscopic hand-assisted sigmoidectomy on 1/8, with post-operative ileus    Plan:  --NGT removed yesterday d/t pt passing large amounts of flatus  --Keep on sips of Clears for continued distension, advance diet slowly  --IVF  --Pain control  --Replete electrolytes PRN    Subjective/Objective     Subjective:     No acute events overnight  This morning, pt feeling much better since NGT removal  Passing flatus, no bowel movements  Ambulated x3 yesterday  Denies any N/V  Objective:     Blood pressure 139/74, pulse 87, temperature 98 4 °F (36 9 °C), resp  rate 18, height 5' 6 5" (1 689 m), weight 93 9 kg (207 lb), SpO2 92 %  ,Body mass index is 32 91 kg/m²  Intake/Output Summary (Last 24 hours) at 1/12/2020 0652  Last data filed at 1/12/2020 0648  Gross per 24 hour   Intake 1017 2 ml   Output 1425 ml   Net -407 8 ml       Invasive Devices     Peripheral Intravenous Line            Peripheral IV 01/11/20 Left Wrist less than 1 day                Physical Exam:     GEN: NAD  HEENT: MMM  CV: RRR  Lung: normal effort  Ab: Soft, NT, moderately distended, tympanic on right side of abdomen  Extrem: No CCE  Neuro:  A+Ox3, motor and sensation grossly intact    Lab, Imaging and other studies:  CBC:   Lab Results   Component Value Date    WBC 12 39 (H) 01/12/2020    HGB 11 5 (L) 01/12/2020    HCT 35 0 (L) 01/12/2020    MCV 91 01/12/2020     01/12/2020    MCH 30 0 01/12/2020    MCHC 32 9 01/12/2020    RDW 13 2 01/12/2020    MPV 9 9 01/12/2020    NRBC 0 01/12/2020   , CMP:   Lab Results   Component Value Date    SODIUM 136 01/12/2020    K 4 0 01/12/2020     01/12/2020    CO2 28 01/12/2020    BUN 9 01/12/2020    CREATININE 0 71 01/12/2020    CALCIUM 8 8 01/12/2020    EGFR 116 01/12/2020   , Coagulation: No results found for: PT, INR, APTT, Urinalysis: No results found for: Courtney Dad, SPECGRAV, PHUR, LEUKOCYTESUR, NITRITE, PROTEINUA, GLUCOSEU, KETONESU, BILIRUBINUR, BLOODU, Amylase: No results found for: AMYLASE, Lipase: No results found for: LIPASE  VTE Pharmacologic Prophylaxis: Heparin  VTE Mechanical Prophylaxis: sequential compression device

## 2020-01-12 NOTE — PROGRESS NOTES
Imelda from [facility] made aware of 2300 assessment of pt having no bowel sounds  Abdomen firm and  Distended  No orsders given at this time    Will monitor

## 2020-01-13 PROCEDURE — C9113 INJ PANTOPRAZOLE SODIUM, VIA: HCPCS | Performed by: PHYSICIAN ASSISTANT

## 2020-01-13 RX ORDER — HYDROMORPHONE HCL/PF 1 MG/ML
0.5 SYRINGE (ML) INJECTION EVERY 4 HOURS PRN
Status: DISCONTINUED | OUTPATIENT
Start: 2020-01-13 | End: 2020-01-14 | Stop reason: HOSPADM

## 2020-01-13 RX ORDER — OXYCODONE HYDROCHLORIDE 10 MG/1
10 TABLET ORAL EVERY 4 HOURS PRN
Status: DISCONTINUED | OUTPATIENT
Start: 2020-01-13 | End: 2020-01-14 | Stop reason: HOSPADM

## 2020-01-13 RX ORDER — DEXTROSE, SODIUM CHLORIDE, AND POTASSIUM CHLORIDE 5; .45; .15 G/100ML; G/100ML; G/100ML
75 INJECTION INTRAVENOUS CONTINUOUS
Status: DISCONTINUED | OUTPATIENT
Start: 2020-01-13 | End: 2020-01-13

## 2020-01-13 RX ORDER — OXYCODONE HYDROCHLORIDE 5 MG/1
5 TABLET ORAL EVERY 4 HOURS PRN
Status: DISCONTINUED | OUTPATIENT
Start: 2020-01-13 | End: 2020-01-14 | Stop reason: HOSPADM

## 2020-01-13 RX ADMIN — DEXTROSE, SODIUM CHLORIDE, AND POTASSIUM CHLORIDE 75 ML/HR: 5; .45; .15 INJECTION INTRAVENOUS at 08:29

## 2020-01-13 RX ADMIN — METOPROLOL TARTRATE 2.5 MG: 5 INJECTION, SOLUTION INTRAVENOUS at 04:57

## 2020-01-13 RX ADMIN — OXYCODONE HYDROCHLORIDE 10 MG: 10 TABLET ORAL at 20:17

## 2020-01-13 RX ADMIN — PANTOPRAZOLE SODIUM 40 MG: 40 INJECTION, POWDER, FOR SOLUTION INTRAVENOUS at 08:36

## 2020-01-13 RX ADMIN — HEPARIN SODIUM 5000 UNITS: 5000 INJECTION INTRAVENOUS; SUBCUTANEOUS at 21:36

## 2020-01-13 RX ADMIN — HEPARIN SODIUM 5000 UNITS: 5000 INJECTION INTRAVENOUS; SUBCUTANEOUS at 04:58

## 2020-01-13 RX ADMIN — HYDROMORPHONE HYDROCHLORIDE 0.5 MG: 1 INJECTION, SOLUTION INTRAMUSCULAR; INTRAVENOUS; SUBCUTANEOUS at 21:36

## 2020-01-13 RX ADMIN — NICOTINE 14 MG: 14 PATCH TRANSDERMAL at 08:37

## 2020-01-13 RX ADMIN — BUDESONIDE AND FORMOTEROL FUMARATE DIHYDRATE 2 PUFF: 80; 4.5 AEROSOL RESPIRATORY (INHALATION) at 17:51

## 2020-01-13 RX ADMIN — OXYCODONE HYDROCHLORIDE 10 MG: 10 TABLET ORAL at 14:25

## 2020-01-13 RX ADMIN — BUDESONIDE AND FORMOTEROL FUMARATE DIHYDRATE 2 PUFF: 80; 4.5 AEROSOL RESPIRATORY (INHALATION) at 08:38

## 2020-01-13 RX ADMIN — METOPROLOL TARTRATE 2.5 MG: 5 INJECTION, SOLUTION INTRAVENOUS at 12:14

## 2020-01-13 RX ADMIN — METOPROLOL TARTRATE 2.5 MG: 5 INJECTION, SOLUTION INTRAVENOUS at 17:52

## 2020-01-13 RX ADMIN — HEPARIN SODIUM 5000 UNITS: 5000 INJECTION INTRAVENOUS; SUBCUTANEOUS at 13:13

## 2020-01-13 NOTE — UTILIZATION REVIEW
Continued Stay Review    Date: 1/13                      Current Patient Class: Inpatient  Current Level of Care: Med surg    HPI:44 y o  male initially admitted on 1/8  Assessment/Plan:   NGtube removed 1/11 due to increased bowel function  Advance diet as tolerated  Wean IV fluids  Continue with pain control  Tolerating po without N/V, passing flatus  Moving his bowels  Dilaudid PCA discontinued today  Continue with as needed pain meds  Pertinent Labs/Diagnostic Results:   Results from last 7 days   Lab Units 01/12/20  0538 01/11/20  0940 01/10/20  0527 01/09/20  0514  01/08/20  1324   WBC Thousand/uL 12 39* 14 81* 15 47* 10 04  --  6 96   HEMOGLOBIN g/dL 11 5* 12 9 12 5 12 2  --  15 1   HEMATOCRIT % 35 0* 39 2 38 0 37 9  --  45 6   PLATELETS Thousands/uL 242 220 226 222  --  261   NEUTROS ABS Thousands/µL 9 07* 11 23* 12 46* 8 33*   < >  --     < > = values in this interval not displayed           Results from last 7 days   Lab Units 01/12/20  0538 01/11/20  1008 01/10/20  1359 01/10/20  0527 01/09/20  0514   SODIUM mmol/L 136 133* 129* 129* 136   POTASSIUM mmol/L 4 0 4 0 4 3 3 8 4 3   CHLORIDE mmol/L 104 100 98* 97* 106   CO2 mmol/L 28 27 29 28 26   ANION GAP mmol/L 4 6 2* 4 4   BUN mg/dL 9 9 7 7 11   CREATININE mg/dL 0 71 0 72 0 91 0 98 0 85   EGFR ml/min/1 73sq m 116 115 104 95 107   CALCIUM mg/dL 8 8 8 9 8 8 8 7 8 1*   MAGNESIUM mg/dL  --   --   --   --  2 1         Results from last 7 days   Lab Units 01/08/20  1850   POC GLUCOSE mg/dl 129     Results from last 7 days   Lab Units 01/12/20  0538 01/11/20  1008 01/10/20  1359 01/10/20  0527 01/09/20  0514   GLUCOSE RANDOM mg/dL 81 80 113 115 117     Vital Signs:   Date/Time Temp Pulse Resp BP MAP (mmHg) SpO2 O2 Device   01/13/20 1100 98 °F (36 7 °C) 84 18 140/79  96 %    01/13/20 1025       None (Room air)   01/13/20 0658 98 2 °F (36 8 °C) 88 20 138/74  96 %    01/13/20 04:56:04  69 18 128/70  95 %    01/13/20 02:39:18 97 9 °F (36 6 °C) 70 18 128/77 94 97 %    01/12/20 23:47:15 97 5 °F (36 4 °C) 70  126/77 93 95 %    01/12/20 23:00:11 98 1 °F (36 7 °C) 70 18 118/72 87 96 % None (Room air)   01/12/20 17:49:17  84  137/79 98 97 %    01/12/20 1500 98 1 °F (36 7 °C) 74 18 117/70 86 95 % None (Room air)   01/12/20 13:38:38  85  156/88 111 97 %    01/12/20 11:49:04 98 1 °F (36 7 °C) 88  126/73 91 93 %    01/12/20 08:27:56 98 1 °F (36 7 °C) 83 18 125/71 89 94 %    01/12/20 0804              Medications:   Scheduled Medications:    Medications:  budesonide-formoterol 2 puff Inhalation BID   bupivacaine liposomal 20 mL Infiltration Once   heparin (porcine) 5,000 Units Subcutaneous Q8H [address]   metoprolol 2 5 mg Intravenous Q6H [address]   nicotine 14 mg Transdermal Daily   pantoprazole 40 mg Intravenous Q24H [address]     Continuous IV Infusions:     PRN Meds:    albuterol 2 puff Inhalation Q6H PRN   HYDROmorphone 0 5 mg Intravenous Q4H PRN   naloxone 0 04 mg Intravenous Q3 min PRN   ondansetron 4 mg Intravenous Q6H PRN   oxyCODONE 10 mg Oral Q4H PRN   oxyCODONE 5 mg Oral Q4H PRN   phenol 1 spray Mouth/Throat Q2H PRN       Discharge Plan: TBD  Network Utilization Review Department  [email or web address]  ATTENTION: Please call with any questions or concerns to [phone] and carefully listen to the prompts so that you are directed to the right person  All voicemails are confidential   Celio Larson all requests for admission clinical reviews, approved or denied determinations and any other requests to dedicated fax number below belonging to the campus where the patient is receiving treatment   List of dedicated fax numbers for the Facilities:  FACILITY NAME UR FAX NUMBER   ADMISSION DENIALS (Administrative/Medical Necessity) [phone]   [address] [place] [address] (Maternity/NICU/Pediatrics) [phone]   Chandrika Fernandez [phone]   Taran Nguyen [address] [facility] [phone]   Ignacia Cummings [facility] [address] [facility] [phone]   31 Zandra Morris [address] [facility] [phone] 2[address] [facility]  [phone]   [address] [phone]

## 2020-01-13 NOTE — PROGRESS NOTES
Progress Note - Colorectal Surgery   Lopez Gutierrez 45 y o  male MRN: [id number]  Unit/Bed#: [facility] 626-01 Encounter: [id number]    Assessment:  44 y/o M w/ hx of diverticulitis, s/p laparoscopic hand-assisted sigmoidectomy on 1/8, with post-operative ileus  NGT removed 1/11 due to increased bowel function  Plan:  · Advance to regular diet as tolerated  · Wean IVF once tolerating regular diet  · Pain control  · Encourage OOB, ambulate  · DVT PPx      Subjective/Objective     Subjective:   No acute events overnight  Patient states his abdominal pain is slightly improved  Tolerating PO without N/V  Passing flatus, BMx3 yesterday  Ambulated through hallways several times yesterday  Objective:     Blood pressure 128/70, pulse 69, temperature 97 9 °F (36 6 °C), resp  rate 18, height 5' 6 5" (1 689 m), weight 93 9 kg (207 lb), SpO2 95 %  ,Body mass index is 32 91 kg/m²  Intake/Output Summary (Last 24 hours) at 1/13/2020 0555  Last data filed at 1/13/2020 0501  Gross per 24 hour   Intake 2544 33 ml   Output    Net 2544 33 ml       Invasive Devices     Peripheral Intravenous Line            Peripheral IV 01/11/20 Left Wrist 1 day                Physical Exam:   Gen: NAD  CV: RRR  Lungs: nl effort  Abd: soft, appropriately tender, distension improving  Ext: no CCE  Skin: no rashes  Neuro: A&Ox3     Lab, Imaging and other studies:I have personally reviewed pertinent lab results      VTE Pharmacologic Prophylaxis: Heparin  VTE Mechanical Prophylaxis: sequential compression device

## 2020-01-13 NOTE — SOCIAL WORK
Patient reviewed  Patient not medically clear for discharge today  Patient will continue to follow for discharge planning needs

## 2020-01-14 ENCOUNTER — TRANSITIONAL CARE MANAGEMENT (OUTPATIENT)
Dept: FAMILY MEDICINE CLINIC | Facility: CLINIC | Age: 45
End: 2020-01-14

## 2020-01-14 VITALS
HEIGHT: 67 IN | SYSTOLIC BLOOD PRESSURE: 138 MMHG | BODY MASS INDEX: 32.49 KG/M2 | DIASTOLIC BLOOD PRESSURE: 76 MMHG | OXYGEN SATURATION: 95 % | TEMPERATURE: 98.4 F | RESPIRATION RATE: 18 BRPM | WEIGHT: 207 LBS | HEART RATE: 69 BPM

## 2020-01-14 PROCEDURE — C9113 INJ PANTOPRAZOLE SODIUM, VIA: HCPCS | Performed by: PHYSICIAN ASSISTANT

## 2020-01-14 RX ORDER — NICOTINE 21 MG/24HR
1 PATCH, TRANSDERMAL 24 HOURS TRANSDERMAL DAILY
Qty: 28 PATCH | Refills: 0 | Status: SHIPPED | OUTPATIENT
Start: 2020-01-15 | End: 2020-01-22 | Stop reason: SDUPTHER

## 2020-01-14 RX ORDER — OXYCODONE HYDROCHLORIDE 5 MG/1
5 TABLET ORAL EVERY 4 HOURS PRN
Qty: 30 TABLET | Refills: 0 | Status: SHIPPED | OUTPATIENT
Start: 2020-01-14 | End: 2020-01-22 | Stop reason: ALTCHOICE

## 2020-01-14 RX ADMIN — OXYCODONE HYDROCHLORIDE 10 MG: 10 TABLET ORAL at 09:05

## 2020-01-14 RX ADMIN — METOPROLOL TARTRATE 2.5 MG: 5 INJECTION, SOLUTION INTRAVENOUS at 00:27

## 2020-01-14 RX ADMIN — METOPROLOL TARTRATE 2.5 MG: 5 INJECTION, SOLUTION INTRAVENOUS at 06:50

## 2020-01-14 RX ADMIN — HYDROMORPHONE HYDROCHLORIDE 0.5 MG: 1 INJECTION, SOLUTION INTRAMUSCULAR; INTRAVENOUS; SUBCUTANEOUS at 07:08

## 2020-01-14 RX ADMIN — OXYCODONE HYDROCHLORIDE 10 MG: 10 TABLET ORAL at 00:27

## 2020-01-14 RX ADMIN — HEPARIN SODIUM 5000 UNITS: 5000 INJECTION INTRAVENOUS; SUBCUTANEOUS at 06:49

## 2020-01-14 RX ADMIN — NICOTINE 14 MG: 14 PATCH TRANSDERMAL at 08:00

## 2020-01-14 RX ADMIN — PANTOPRAZOLE SODIUM 40 MG: 40 INJECTION, POWDER, FOR SOLUTION INTRAVENOUS at 08:01

## 2020-01-14 RX ADMIN — OXYCODONE HYDROCHLORIDE 10 MG: 10 TABLET ORAL at 04:27

## 2020-01-14 RX ADMIN — BUDESONIDE AND FORMOTEROL FUMARATE DIHYDRATE 2 PUFF: 80; 4.5 AEROSOL RESPIRATORY (INHALATION) at 08:01

## 2020-01-14 NOTE — PROGRESS NOTES
Progress Note - Colorectal Surgery   Dakota Petersen 45 y o  male MRN: [id number]  Unit/Bed#: [facility] 626-01 Encounter: [id number]    Assessment:  46 y/o M w/ hx of diverticulitis, s/p laparoscopic hand-assisted sigmoidectomy on 1/8, with post-operative ileus  Now with return of bowel function  Plan:  · Regular diet  · PO pain control  · OOB, ambulate  · DVT PPx  · Dispo: anticipate d/c home today    Subjective/Objective     Subjective:   No acute events overnight  States his abdominal pain is "not bad," currently well controlled on PO analgesic regimen  Continues to have bowel function  Urinating at baseline, per patient  Ambulated through hallways yesterday  Objective:     Blood pressure 132/77, pulse 70, temperature 98 2 °F (36 8 °C), resp  rate 18, height 5' 6 5" (1 689 m), weight 93 9 kg (207 lb), SpO2 95 %  ,Body mass index is 32 91 kg/m²  Intake/Output Summary (Last 24 hours) at 1/14/2020 0547  Last data filed at 1/13/2020 1300  Gross per 24 hour   Intake 834 4 ml   Output 475 ml   Net 359 4 ml       Invasive Devices     Peripheral Intravenous Line            Peripheral IV 01/11/20 Left Wrist 2 days                Physical Exam:   Gen: NAD  CV: RRR  Lungs: nl effort  Abd: soft, nontender, mildly distended, no tympany  Ext: no CCE  Skin: no rashes  Neuro: A&Ox3     Lab, Imaging and other studies:I have personally reviewed pertinent lab results      VTE Pharmacologic Prophylaxis: Heparin  VTE Mechanical Prophylaxis: sequential compression device

## 2020-01-14 NOTE — DISCHARGE INSTRUCTIONS
Diverticulitis   WHAT YOU NEED TO KNOW:   Diverticulitis is a condition that causes small pockets along your intestine called diverticula to become inflamed or infected  This is caused by hard bowel movements, food, or bacteria that get stuck in the pockets  DISCHARGE INSTRUCTIONS:   Seek care immediately if:   · You have bowel movement or foul-smelling discharge leaking from your vagina or in your urine  · You have severe diarrhea  · You urinate less than usual or not at all  · You are not able to have a bowel movement  · You cannot stop vomiting  · You have severe abdominal pain, a fever, and your abdomen is larger than usual      · You have new or increased blood in your bowel movements  Contact your healthcare provider if:   · You have pain when you urinate  · Your symptoms get worse or do not go away  · You have questions or concerns about your condition or care  Medicines:   · Antibiotics  may be given to help prevent or treat a bacterial infection  · Prescription pain medicine  may be given  Ask your healthcare provider how to take this medicine safely  Some prescription pain medicines contain acetaminophen  Do not take other medicines that contain acetaminophen without talking to your healthcare provider  Too much acetaminophen may cause liver damage  Prescription pain medicine may cause constipation  Ask your healthcare provider how to prevent or treat constipation  · Take your medicine as directed  Contact your healthcare provider if you think your medicine is not helping or if you have side effects  Tell him or her if you are allergic to any medicine  Keep a list of the medicines, vitamins, and herbs you take  Include the amounts, and when and why you take them  Bring the list or the pill bottles to follow-up visits  Carry your medicine list with you in case of an emergency  Clear liquid diet:  A clear liquid diet includes any liquids that you can see through  Examples include water, ginger-itz, cranberry or apple juice, frozen fruit ice, or broth  Stay on a clear liquid diet until your symptoms are gone, or as directed  Follow up with your healthcare provider as directed: You may need to return for a colonoscopy  When your symptoms are gone, you may need a low-fat, high-fiber diet to help prevent diverticulitis from developing again  Your healthcare provider or dietitian can help you create meal plans  Write down your questions so you remember to ask them during your visits  © 2017 [facility] Information is for End User's use only and may not be sold, redistributed or otherwise used for commercial purposes  All illustrations and images included in CareNotes® are the copyrighted property of [employer] , [employer]  or Donovan Feliz  The above information is an  only  It is not intended as medical advice for individual conditions or treatments  Talk to your doctor, nurse or pharmacist before following any medical regimen to see if it is safe and effective for you

## 2020-01-14 NOTE — DISCHARGE SUMMARY
Discharge Summary    Damion Robbins 45 y o  male MRN: [id number]    Unit/Bed#: [facility] 626-01 Encounter: [id number]    Admission Date: 1/8/2020     Discharge Date:/1/14/2020    Attending:  Peterson Metcalf MD    Consultants:  None    Admitting Diagnosis: Diverticulitis large intestine w/o perforation or abscess w/o bleeding [K57 [id number]]    Principle Diagnosis:  Same as admitting diagnosis    Secondary Diagnosis:  Past Medical History:   Diagnosis Date    Diverticulitis     Hyperlipemia     Hypertension     Prediabetes     Psoriasis      Past Surgical History:   Procedure Laterality Date    COLONOSCOPY      GA COLONOSCOPY FLX DX W/COLLJ SPEC WHEN PFRMD N/A 4/3/2019    Procedure: COLONOSCOPY;  Surgeon: Steve Richards MD;  Location: [facility]  GI LAB; Service: Colorectal    GA LAP,SURG,COLECTOMY, PARTIAL, W/ANAST N/A 1/8/2020    Procedure: RESECTION COLON SIGMOID LAPAROSCOPIC, LAPAROSCOPIC SPLENIC FLEXURE MOBILIZATION;  Surgeon: Steve Richards MD;  Location: [facility] MAIN [facility];  Service: Colorectal    GA SIGMOIDOSCOPY FLX DX W/COLLJ SPEC BR/WA IF PFRMD N/A 1/8/2020    Procedure: SIGMOIDOSCOPY FLEXIBLE;  Surgeon: Steve Richards MD;  Location: [facility];  Service: Colorectal        Procedures Performed: Procedure(s):  RESECTION COLON SIGMOID LAPAROSCOPIC, LAPAROSCOPIC SPLENIC FLEXURE [address]    Imaging:No results found  Discharge Medications:  See after visit summary for reconciled discharge medications provided to patient and family  Brief HPI (per H/P):  Patient is 80-year-old male with history of sigmoid diverticulitis presented for elective sigmoid colectomy  Hospital Course: Damion Robbins is a 45 y o  male who presented 1/8/2020 for above procedure   Intraoperative findings included the following from operative report:  Operative Findings:  Significant thickening of the proximal sigmoid distal descending colon consistent with chronic diverticulitis    The patient hospital course was complicated by postoperative ileus requiring NG tube placement on postop day 2  NG tube was removed on postop day 4 secondary to return of bowel function  Diet was advanced from clears which was tolerated and advanced further to low residue which patient tolerated  Patient continued to have bowel function, ambulating in the hallways and pain continue to be well control  Patient was discharged on HD6/POD6  On the day of discharge, the patient was voiding spontaneously, ambulating at baseline, and pain was well controlled  The patient was sent home with prescriptions for pain  He understood all instructions for discharge  He was also given the names and numbers of the providers as well as instructions for follow up appointments  Condition at Discharge: good     Provisions for Follow-Up Care:  See after visit summary for information related to follow-up care and any pertinent home health orders  Disposition: Home    Discharge instructions/Information to patient and family:   See after visit summary for information provided to patient and family  Planned Readmission: No    Discharge Statement   I spent 25 minutes discharging the patient  This time was spent on the day of discharge  I had direct contact with the patient on the day of discharge  Additional documentation is required if more than 30 minutes were spent on discharge

## 2020-01-14 NOTE — PLAN OF CARE
Problem: PAIN - ADULT  Goal: Verbalizes/displays adequate comfort level or baseline comfort level  Description  Interventions:  - Encourage patient to monitor pain and request assistance  - Assess pain using appropriate pain scale  - Administer analgesics based on type and severity of pain and evaluate response  - Implement non-pharmacological measures as appropriate and evaluate response  - Consider cultural and social influences on pain and pain management  - Notify physician/advanced practitioner if interventions unsuccessful or patient reports new pain  Outcome: Progressing     Problem: INFECTION - ADULT  Goal: Absence or prevention of progression during hospitalization  Description  INTERVENTIONS:  - Assess and monitor for signs and symptoms of infection  - Monitor lab/diagnostic results  - Monitor all insertion sites, i e  indwelling lines, tubes, and drains  - Monitor endotracheal if appropriate and nasal secretions for changes in amount and color  - [place] appropriate cooling/warming therapies per order  - Administer medications as ordered  - Instruct and encourage patient and family to use good hand hygiene technique  - Identify and instruct in appropriate isolation precautions for identified infection/condition  Outcome: Progressing  Goal: Absence of fever/infection during neutropenic period  Description  INTERVENTIONS:  - Monitor WBC    Outcome: Progressing     Problem: SAFETY ADULT  Goal: Patient will remain free of falls  Description  INTERVENTIONS:  - Assess patient frequently for physical needs  -  Identify cognitive and physical deficits and behaviors that affect risk of falls    -  [place] fall precautions as indicated by assessment   - Educate patient/family on patient safety including physical limitations  - Instruct patient to call for assistance with activity based on assessment  - Modify environment to reduce risk of injury  - Consider OT/PT consult to assist with strengthening/mobility  Outcome: Progressing  Goal: Maintain or return to baseline ADL function  Description  INTERVENTIONS:  -  Assess patient's ability to carry out ADLs; assess patient's baseline for ADL function and identify physical deficits which impact ability to perform ADLs (bathing, care of mouth/teeth, toileting, grooming, dressing, etc )  - Assess/evaluate cause of self-care deficits   - Assess range of motion  - Assess patient's mobility; develop plan if impaired  - Assess patient's need for assistive devices and provide as appropriate  - Encourage maximum independence but intervene and supervise when necessary  - Involve family in performance of ADLs  - Assess for home care needs following discharge   - Consider OT consult to assist with ADL evaluation and planning for discharge  - Provide patient education as appropriate  Outcome: Progressing  Goal: Maintain or return mobility status to optimal level  Description  INTERVENTIONS:  - Assess patient's baseline mobility status (ambulation, transfers, stairs, etc )    - Identify cognitive and physical deficits and behaviors that affect mobility  - Identify mobility aids required to assist with transfers and/or ambulation (gait belt, sit-to-stand, lift, walker, cane, etc )  - [place] fall precautions as indicated by assessment  - Record patient progress and toleration of activity level on Mobility SBAR; progress patient to next Phase/Stage  - Instruct patient to call for assistance with activity based on assessment  - Consider rehabilitation consult to assist with strengthening/weightbearing, etc   Outcome: Progressing     Problem: DISCHARGE PLANNING  Goal: Discharge to home or other facility with appropriate resources  Description  INTERVENTIONS:  - Identify barriers to discharge w/patient and caregiver  - Arrange for needed discharge resources and transportation as appropriate  - Identify discharge learning needs (meds, wound care, etc )  - Arrange for interpretive services to assist at discharge as needed  - Refer to Case Management Department for coordinating discharge planning if the patient needs post-hospital services based on physician/advanced practitioner order or complex needs related to functional status, cognitive ability, or social support system  Outcome: Progressing     Problem: Knowledge Deficit  Goal: Patient/family/caregiver demonstrates understanding of disease process, treatment plan, medications, and discharge instructions  Description  Complete learning assessment and assess knowledge base  Interventions:  - Provide teaching at level of understanding  - Provide teaching via preferred learning methods  Outcome: Progressing     Problem: Nutrition/Hydration-ADULT  Goal: Nutrient/Hydration intake appropriate for improving, restoring or maintaining nutritional needs  Description  Monitor and assess patient's nutrition/hydration status for malnutrition  Collaborate with interdisciplinary team and initiate plan and interventions as ordered  Monitor patient's weight and dietary intake as ordered or per policy  Utilize nutrition screening tool and intervene as necessary  Determine patient's food preferences and provide high-protein, high-caloric foods as appropriate       INTERVENTIONS:  - Monitor oral intake, urinary output, labs, and treatment plans  - Assess nutrition and hydration status and recommend course of action  - Evaluate amount of meals eaten  - Assist patient with eating if necessary   - Allow adequate time for meals  - Recommend/ encourage appropriate diets, oral nutritional supplements, and vitamin/mineral supplements  - Order, calculate, and assess calorie counts as needed  - Recommend, monitor, and adjust tube feedings and TPN/PPN based on assessed needs  - Assess need for intravenous fluids  - Provide specific nutrition/hydration education as appropriate  - Include patient/family/caregiver in decisions related to nutrition  Outcome: Progressing

## 2020-01-14 NOTE — DISCHARGE INSTR - AVS FIRST PAGE
Colorectal Discharge Instructions    Please follow-up as instructed  If you do not already have a follow-up appointment, please call the office when you leave to schedule an appointment to be seen in 2-3 weeks for post-operative re-evaluation  Activity:  - No lifting greater than 20 pounds or strenuous physical activity or exercise for at least 4 weeks  - Walking and normal light activities are encouraged  - Normal daily activities including climbing steps are okay  - No driving until no longer using pain medications  Return to work:    - You may return to work in 2 weeks or sooner if you are feeling well enough  Diet:    - You may resume your normal diet  Wound Care:  - May shower daily  No tub baths or swimming until cleared by your surgeon   - Wash incision gently with soap and water and pat dry  - Do not apply any creams or ointments unless instructed to do so by your surgeon   - Denton Hightower may apply ice as needed (no longer than 20 minutes an hour) for the first 48 hours  - Bruising is not unusual and will go away with a little time  You may apply a warm, moist compress that may help the bruising resolve quicker  Medications:    - You may resume all of your regular medications, including blood thinners and aspirin, after going home unless otherwise instructed  Please refer to your discharge medication list for further details  - Please take the pain medications as directed  - You are encouraged to use non-narcotic pain medications first and whenever possible  Reserve the use of narcotic pain medication for moderate to severe pain not controlled by non-narcotic medications   - No driving while taking narcotic pain medications  - You may become constipated, especially if taking pain medications  You may take any over the counter stool softeners or laxatives as needed  Examples: Milk of Magnesia, Colace, Senna      Additional Instructions:  - If you have any questions or concerns after discharge please call the office   - Call office or return to ER if fever greater than 101, chills, persistent nausea/vomiting, worsening/uncontrollable pain, and/or increasing redness or purulent/foul smelling drainage from incision(s)

## 2020-01-22 ENCOUNTER — OFFICE VISIT (OUTPATIENT)
Dept: FAMILY MEDICINE CLINIC | Facility: CLINIC | Age: 45
End: 2020-01-22
Payer: COMMERCIAL

## 2020-01-22 VITALS
DIASTOLIC BLOOD PRESSURE: 80 MMHG | WEIGHT: 202.6 LBS | HEIGHT: 67 IN | HEART RATE: 84 BPM | OXYGEN SATURATION: 98 % | RESPIRATION RATE: 16 BRPM | BODY MASS INDEX: 31.8 KG/M2 | SYSTOLIC BLOOD PRESSURE: 130 MMHG

## 2020-01-22 DIAGNOSIS — E78.5 HYPERLIPIDEMIA, UNSPECIFIED HYPERLIPIDEMIA TYPE: ICD-10-CM

## 2020-01-22 DIAGNOSIS — Z90.49 STATUS POST COLON RESECTION: Primary | ICD-10-CM

## 2020-01-22 DIAGNOSIS — F17.200 SMOKER: ICD-10-CM

## 2020-01-22 DIAGNOSIS — J45.40 MODERATE PERSISTENT ASTHMA WITHOUT COMPLICATION: ICD-10-CM

## 2020-01-22 DIAGNOSIS — I10 ESSENTIAL HYPERTENSION: ICD-10-CM

## 2020-01-22 PROCEDURE — 1111F DSCHRG MED/CURRENT MED MERGE: CPT | Performed by: NURSE PRACTITIONER

## 2020-01-22 PROCEDURE — 99495 TRANSJ CARE MGMT MOD F2F 14D: CPT | Performed by: NURSE PRACTITIONER

## 2020-01-22 RX ORDER — NICOTINE 21 MG/24HR
1 PATCH, TRANSDERMAL 24 HOURS TRANSDERMAL DAILY
Qty: 28 PATCH | Refills: 0 | Status: SHIPPED | OUTPATIENT
Start: 2020-01-22 | End: 2020-04-03

## 2020-01-22 NOTE — PROGRESS NOTES
Assessment/Plan:      Diagnoses and all orders for this visit:    Status post colon resection  -     Comprehensive metabolic panel; Future  -     CBC and differential; Future    Patient had a colon resection on 1/8/20 and was discharged on 1/14/20  Patient has a follow-up with his surgeon Dr Jaycob Wang next week  Denies any fever, vomiting, nausea, diarrhea, or blood in his stool  Patient reports that his is having a bowel movement every 3 days  No signs of infection at incision sites  Patient reports that he is trying to watch his diet  Smoker  -     nicotine (NICODERM CQ) 14 mg/24hr TD 24 hr patch; Place 1 patch on the skin daily    Patient reports that he has not smoked since he was admitted to the hospital  Continue nicotine patch  Moderate persistent asthma without complication  Stable  Continue symbicort and ventolin prn  Essential hypertension  -     Comprehensive metabolic panel; Future  -     CBC and differential; Future    /80  Continue losartan 50mg daily  Hyperlipidemia, unspecified hyperlipidemia type  Continue crestor  Lab work ordered  Patient instructed to follow-up in 1 month with his PCP, Dr Dejah Ro or sooner prn  Subjective:     Patient ID: Angella Johnston is a 45 y o  male  Patient reports that he went to [facility] on 1/8/2020 for resection of the sigmoid colon  Patient reports that he was having frequent episodes of diverticulitis  Patient reports that Dr Jaycob Wang did the surgery  Patient reports that he had an nj tube placed after the surgery due to an ileus  Patient reports that they slowly advanced his diet after they removed the nj tube  Patient reports that he was discharged on 1/14/2020  Patient reports that he has a follow-up with Dr Jaycob Wang next week  Patient reports that his abdomen is alittle sore but is healing  Patient reports that he has been having a formed bowel movement every 3 days   Denies any blood in the stool, diarrhea, nausea, or vomiting  Patient reports that he has been trying to eat better  Patient reports that he takes losartan for HTN  Denies any chest pain, SOB, or palpitations  Patient reports that he takes crestor for hyperlipidemia  Denies any side effects  Patient reports that he quit smoking  Patient reports that he needs a refill on the patches for smoking cessation  Patient reports that he takes symbicort daily for asthma  Patient reports that he uses ventolin prn  Denies any fever, cough, wheezing, or SOB  Patient reports that his asthma has gotten better  TCM Call (since 12/22/2019)     Date and time call was made  1/14/2020  3:01 PM    Hospital care reviewed  Records reviewed    Patient was hospitialized at  [facility]    Date of Admission  01/08/20    Date of discharge  01/14/20    Diagnosis  Diverticulitis large intestine w/o perforation or abscess w/o bleeding     Disposition  Home    Were the patients medications reviewed and updated  Yes    Current Symptoms  Incisional pain    Incisional pain severity  Moderate      TCM Call (since 12/22/2019)     Post hospital issues  None    Should patient be enrolled in anticoag monitoring? No    Scheduled for follow up?   Yes    Patients specialists  Other (comment)    Other specialists names  Myles D[facility] ( colon rectal surgery)    Did you obtain your prescribed medications  Yes    Do you need help managing your prescriptions or medications  No    Is transportation to your appointment needed  No    I have advised the patient to call PCP with any new or worsening symptoms  vickie mcdonald ma    Support System  Family    Do you have social support  Yes, as much as I need    Are you recieving any outpatient services  No    Are you recieving home care services  No    Are you using any community resources  No    Current waiver services  No    Have you fallen in the last 12 months  No    Interperter language line needed  No          Review of Systems Constitutional: Negative for chills and fever  HENT: Negative for congestion, ear pain and sore throat  Respiratory: Negative for cough, chest tightness and shortness of breath  Cardiovascular: Negative for chest pain, palpitations and leg swelling  Gastrointestinal:        As noted in HPI  Genitourinary: Negative for dysuria, frequency and hematuria  Skin: Negative for rash  Neurological: Negative for dizziness, seizures, syncope, light-headedness and headaches  Hematological: Does not bruise/bleed easily  Psychiatric/Behavioral: Negative for suicidal ideas  Denies any depression  Objective:     Physical Exam   Constitutional: He is oriented to person, place, and time  No distress  HENT:   Right Ear: External ear normal    Left Ear: External ear normal    Mouth/Throat: Oropharynx is clear and moist    Eyes: Pupils are equal, round, and reactive to light  Conjunctivae are normal    Cardiovascular: Normal rate, regular rhythm and normal heart sounds  Radial pulses +2 bilaterally  No edema noted  Pulmonary/Chest: Effort normal and breath sounds normal    Abdominal: Soft  No redness, swelling, or drainage noted at incision sites  Musculoskeletal:   Gait wnl  Neurological: He is alert and oriented to person, place, and time  Skin: No rash noted  Psychiatric: He has a normal mood and affect  Vitals reviewed

## 2020-02-25 ENCOUNTER — TELEPHONE (OUTPATIENT)
Dept: OTHER | Facility: OTHER | Age: 45
End: 2020-02-25

## 2020-03-03 LAB
ALBUMIN SERPL-MCNC: 4.4 G/DL (ref 3.6–5.1)
ALBUMIN/GLOB SERPL: 1.6 (CALC) (ref 1–2.5)
ALP SERPL-CCNC: 87 U/L (ref 36–130)
ALT SERPL-CCNC: 30 U/L (ref 9–46)
AST SERPL-CCNC: 19 U/L (ref 10–40)
BASOPHILS # BLD AUTO: 90 CELLS/UL (ref 0–200)
BASOPHILS NFR BLD AUTO: 1.3 %
BILIRUB SERPL-MCNC: 0.4 MG/DL (ref 0.2–1.2)
BUN SERPL-MCNC: 16 MG/DL (ref 7–25)
BUN/CREAT SERPL: ABNORMAL (CALC) (ref 6–22)
CALCIUM SERPL-MCNC: 9.6 MG/DL (ref 8.6–10.3)
CHLORIDE SERPL-SCNC: 103 MMOL/L (ref 98–110)
CO2 SERPL-SCNC: 28 MMOL/L (ref 20–32)
CREAT SERPL-MCNC: 1.04 MG/DL (ref 0.6–1.35)
EOSINOPHIL # BLD AUTO: 179 CELLS/UL (ref 15–500)
EOSINOPHIL NFR BLD AUTO: 2.6 %
ERYTHROCYTE [DISTWIDTH] IN BLOOD BY AUTOMATED COUNT: 14 % (ref 11–15)
GLOBULIN SER CALC-MCNC: 2.7 G/DL (CALC) (ref 1.9–3.7)
GLUCOSE SERPL-MCNC: 104 MG/DL (ref 65–99)
HCT VFR BLD AUTO: 40.2 % (ref 38.5–50)
HGB BLD-MCNC: 13.6 G/DL (ref 13.2–17.1)
LYMPHOCYTES # BLD AUTO: 2781 CELLS/UL (ref 850–3900)
LYMPHOCYTES NFR BLD AUTO: 40.3 %
MCH RBC QN AUTO: 29.6 PG (ref 27–33)
MCHC RBC AUTO-ENTMCNC: 33.8 G/DL (ref 32–36)
MCV RBC AUTO: 87.6 FL (ref 80–100)
MONOCYTES # BLD AUTO: 538 CELLS/UL (ref 200–950)
MONOCYTES NFR BLD AUTO: 7.8 %
NEUTROPHILS # BLD AUTO: 3312 CELLS/UL (ref 1500–7800)
NEUTROPHILS NFR BLD AUTO: 48 %
PLATELET # BLD AUTO: 292 THOUSAND/UL (ref 140–400)
PMV BLD REES-ECKER: 10.1 FL (ref 7.5–12.5)
POTASSIUM SERPL-SCNC: 4.7 MMOL/L (ref 3.5–5.3)
PROT SERPL-MCNC: 7.1 G/DL (ref 6.1–8.1)
RBC # BLD AUTO: 4.59 MILLION/UL (ref 4.2–5.8)
SL AMB EGFR AFRICAN AMERICAN: 102 ML/MIN/1.73M2
SL AMB EGFR NON AFRICAN AMERICAN: 88 ML/MIN/1.73M2
SODIUM SERPL-SCNC: 138 MMOL/L (ref 135–146)
WBC # BLD AUTO: 6.9 THOUSAND/UL (ref 3.8–10.8)

## 2020-03-06 ENCOUNTER — TELEPHONE (OUTPATIENT)
Dept: OTHER | Facility: OTHER | Age: 45
End: 2020-03-06

## 2020-03-12 ENCOUNTER — TELEPHONE (OUTPATIENT)
Dept: OBGYN CLINIC | Facility: HOSPITAL | Age: 45
End: 2020-03-12

## 2020-03-12 NOTE — TELEPHONE ENCOUNTER
Caller: Marylou Pastor  Provider: Dr Edwin Lewis  Call WXIA:[phone]  Reason for call:    Patient has seen Dr Edwin Lewis for lateral epicondylitis of both elbows  Patient states that while he believes Dr Edwin Lewis to be a good doctor, he finds it difficult to talk to him  Kathy Donaldson originally called to see if he could schedule with Dr Radha Jovel, but his insurance would not allow him to be treated in [place]  Patient would like to know if Dr Jayden Robles would consider seeing him? Please advise

## 2020-03-23 ENCOUNTER — APPOINTMENT (OUTPATIENT)
Dept: RADIOLOGY | Facility: AMBULARY SURGERY CENTER | Age: 45
End: 2020-03-23
Attending: SURGERY
Payer: COMMERCIAL

## 2020-03-23 ENCOUNTER — OFFICE VISIT (OUTPATIENT)
Dept: OBGYN CLINIC | Facility: CLINIC | Age: 45
End: 2020-03-23
Payer: COMMERCIAL

## 2020-03-23 VITALS
SYSTOLIC BLOOD PRESSURE: 164 MMHG | BODY MASS INDEX: 32.62 KG/M2 | HEIGHT: 66 IN | WEIGHT: 203 LBS | DIASTOLIC BLOOD PRESSURE: 89 MMHG | HEART RATE: 80 BPM

## 2020-03-23 DIAGNOSIS — M25.521 BILATERAL ELBOW JOINT PAIN: ICD-10-CM

## 2020-03-23 DIAGNOSIS — M77.12 LATERAL EPICONDYLITIS OF BOTH ELBOWS: Primary | ICD-10-CM

## 2020-03-23 DIAGNOSIS — M25.522 BILATERAL ELBOW JOINT PAIN: ICD-10-CM

## 2020-03-23 DIAGNOSIS — M77.11 LATERAL EPICONDYLITIS OF BOTH ELBOWS: Primary | ICD-10-CM

## 2020-03-23 PROCEDURE — 3008F BODY MASS INDEX DOCD: CPT | Performed by: SURGERY

## 2020-03-23 PROCEDURE — 3077F SYST BP >= 140 MM HG: CPT | Performed by: SURGERY

## 2020-03-23 PROCEDURE — 73080 X-RAY EXAM OF ELBOW: CPT

## 2020-03-23 PROCEDURE — 99214 OFFICE O/P EST MOD 30 MIN: CPT | Performed by: SURGERY

## 2020-03-23 PROCEDURE — 3079F DIAST BP 80-89 MM HG: CPT | Performed by: SURGERY

## 2020-03-23 PROCEDURE — 4004F PT TOBACCO SCREEN RCVD TLK: CPT | Performed by: SURGERY

## 2020-03-23 RX ORDER — MELOXICAM 15 MG/1
15 TABLET ORAL DAILY
Qty: 30 TABLET | Refills: 3 | Status: SHIPPED | OUTPATIENT
Start: 2020-03-23 | End: 2020-04-03

## 2020-03-23 NOTE — PROGRESS NOTES
ASSESSMENT/PLAN:      39 y o  male with bilateral lateral epicondylitis  Lateral epicondylitis was discussed at length with Glendale Habermann today  It was discussed that therapy is the main stay of treatment options with regards to lateral epicondylitis  OT was ordered today for lateral epicondylitis stretching and strengthening exercises  It was discussed with Glendale Habermann today that surgical intervention will not be discussed until he has tried at least 8-12 weeks of therapy  He must be consistent with therapy and perform a HEP  He should perform his HEP 5x a week  Glendale Habermann was fit with bilateral cock up wrist braces in the office today, that he will wear at night  Mobic was prescribed today and sent to his pharmacy electronically, to be taken once a day with food  I will see him back in the office as needed if symptoms worsen or fail to improve  The patient verbalized understanding of exam findings and treatment plan  We engaged in the shared decision-making process and treatment options were discussed at length with the patient  Surgical and conservative management discussed today along with risks and benefits  Diagnoses and all orders for this visit:    Lateral epicondylitis of both elbows  -     Ambulatory referral to PT/OT hand therapy; Future    Bilateral elbow joint pain  -     XR elbow 3+ vw right; Future  -     XR elbow 3+ vw left; Future  -     Ambulatory referral to PT/OT hand therapy; Future    Other orders  -     meloxicam (MOBIC) 15 mg tablet; Take 1 tablet (15 mg total) by mouth daily      Follow Up:  Return if symptoms worsen or fail to improve        To Do Next Visit:  Re-evaluation of current issue    ____________________________________________________________________________________________________________________________________________      CHIEF COMPLAINT:  Chief Complaint   Patient presents with    Right Elbow - Pain    Left Elbow - Pain       SUBJECTIVE:  Zaira Chang is a 39y o  year old RHD male who presents to the office today for bilateral elbow pain  Makayla Vann states that he has been experiencing pain to the lateral aspect of both of his elbows for aprox  1 year  He states that currently his right elbow is worse then his left  Makayla Vann was last seen by Dr Jenni Sosa on 12/23/19, at which time bilateral lateral epicondylitis CSI's were performed  He notes some improvement following the CSI's  He has not attended therapy  He is not taking anything for pain control at this time  I have personally reviewed all the relevant PMH, PSH, SH, FH, Medications and allergies  PAST MEDICAL HISTORY:  Past Medical History:   Diagnosis Date    Diverticulitis     Hyperlipemia     Hypertension     Prediabetes     Psoriasis        PAST SURGICAL HISTORY:  Past Surgical History:   Procedure Laterality Date    COLONOSCOPY      [facility] COLONOSCOPY FLX DX W/COLLJ SPEC WHEN PFRMD N/A 4/3/2019    Procedure: COLONOSCOPY;  Surgeon: John Meyers MD;  [facility];   Service: Colorectal    VA LAP,SURG,COLECTOMY, PARTIAL, W/ANAST N/A 1/8/2020    Procedure: RESECTION COLON SIGMOID LAPAROSCOPIC, LAPAROSCOPIC SPLENIC FLEXURE MOBILIZATION;  Surgeon: John Meyers MD;  [facility];  Service: Colorectal    VA SIGMOIDOSCOPY FLX DX W/COLLJ SPEC BR/WA IF PFRMD N/A 1/8/2020    Procedure: Kelin Roa;  Surgeon: John Meyers MD;  [facility];  Service: Colorectal       FAMILY HISTORY:  Family History   Problem Relation Age of Onset    Diabetes Maternal Grandmother     Diabetes Paternal Grandfather     Colon cancer Neg Hx        SOCIAL HISTORY:  Social History     Tobacco Use    Smoking status: Former Smoker     Packs/day: 0 00     Years: 0 00     Pack years: 0 00    Smokeless tobacco: Current User     Types: Chew   Substance Use Topics    Alcohol use: Yes     Comment: occasional alcohol use    Drug use: No       MEDICATIONS:    Current Outpatient Medications:     albuterol (PROVENTIL HFA,VENTOLIN HFA) 90 mcg/act inhaler, Inhale 2 puffs every 6 (six) hours as needed for wheezing or shortness of breath, Disp: 1 Inhaler, Rfl: 0    budesonide-formoterol (SYMBICORT) 80-4 5 MCG/ACT inhaler, Inhale 2 puffs 2 (two) times a day Rinse mouth after use  (Patient taking differently: Inhale 2 puffs as needed Rinse mouth after use ), Disp: 1 Inhaler, Rfl: 2    Cholecalciferol (VITAMIN D) 2000 units CAPS, Take 1 capsule by mouth daily, Disp: , Rfl:     Hydrocortisone Butyrate 0 1 % CREA, as needed , Disp: , Rfl: 3    losartan (COZAAR) 50 mg tablet, Take 1 tablet (50 mg total) by mouth daily, Disp: 90 tablet, Rfl: 1    Multiple Vitamin (MULTIVITAMIN) capsule, Take 1 capsule by mouth daily, Disp: , Rfl:     nicotine (NICODERM CQ) 14 mg/24hr TD 24 hr patch, Place 1 patch on the skin daily, Disp: 28 patch, Rfl: 0    rosuvastatin (CRESTOR) 5 mg tablet, Take 1 tablet (5 mg total) by mouth daily, Disp: 90 tablet, Rfl: 1    Ustekinumab (STELARA SC), Inject under the skin every 3 (three) months , Disp: , Rfl:     ALLERGIES:  No Known Allergies    REVIEW OF SYSTEMS:  Review of Systems   Constitutional: Negative for chills, fever and unexpected weight change  HENT: Negative for hearing loss, nosebleeds and sore throat  Eyes: Negative for pain, redness and visual disturbance  Respiratory: Negative for cough, shortness of breath and wheezing  Cardiovascular: Negative for chest pain, palpitations and leg swelling  Gastrointestinal: Negative for abdominal pain, nausea and vomiting  Endocrine: Negative for polydipsia and polyuria  Genitourinary: Negative for difficulty urinating and hematuria  Musculoskeletal: Negative for arthralgias, joint swelling and myalgias  Skin: Negative for rash and wound  Neurological: Negative for dizziness, numbness and headaches  Psychiatric/Behavioral: Negative for decreased concentration, dysphoric mood and suicidal ideas   The patient is not nervous/anxious  VITALS:  Vitals:    03/23/20 0813   BP: 164/89   Pulse: 80       LABS:  HgA1c:   Lab Results   Component Value Date    HGBA1C 6 3 01/02/2020     BMP:   Lab Results   Component Value Date    CALCIUM 9 6 03/03/2020    K 4 7 03/03/2020    CO2 28 03/03/2020     03/03/2020    BUN 16 03/03/2020    CREATININE 1 04 03/03/2020       _____________________________________________________  PHYSICAL EXAMINATION:  General: well developed and well nourished, alert, oriented times 3 and appears comfortable  Psychiatric: Normal  HEENT: Normocephalic, Atraumatic Trachea Midline, No torticollis  Pulmonary: No audible wheezing or respiratory distress   Cardiovascular: No pitting edema, 2+ radial pulse   Skin: No masses, erythema, lacerations, fluctation, ulcerations  Neurovascular: Sensation Intact to the Median, Ulnar, Radial Nerve, Motor Intact to the Median, Ulnar, Radial Nerve and Pulses Intact  Musculoskeletal: Normal, except as noted in detailed exam and in HPI  MUSCULOSKELETAL EXAMINATION:    Bilateral Elbow's: No swelling or deformity  Full range of motion extension, supination and pronation, slightly limited flexion  Positive tenderness to palpation over the Lateral Epicondyle on the right, negative on the left  No pain with passive flexion of the wrist with elbow fully extended  Pain with resisted wrist extension with elbow fully extended on the right, negative on the left  No pain with resisted long finger extension with the elbow fully extended  Negative tinels over the ulnar nerve at the elbow        ___________________________________________________  STUDIES REVIEWED:  I have personally reviewed AP lateral and oblique radiographs of right elbow which demonstrates no acute fracture dislocation no significant degenerative changes mild calcification of the common extensor origin    I personally viewed AP lateral oblique radiographs of the left elbow without any acute osseous abnormality        PROCEDURES PERFORMED:  Procedures  No Procedures performed today    _____________________________________________________      Mee Leyva    I,:   Catia Pino am acting as a scribe while in the presence of the attending physician :        I,:   Susu Spangler MD personally performed the services described in this documentation    as scribed in my presence :

## 2020-04-03 ENCOUNTER — OFFICE VISIT (OUTPATIENT)
Dept: OBGYN CLINIC | Facility: CLINIC | Age: 45
End: 2020-04-03
Payer: COMMERCIAL

## 2020-04-03 VITALS
SYSTOLIC BLOOD PRESSURE: 151 MMHG | HEART RATE: 82 BPM | DIASTOLIC BLOOD PRESSURE: 90 MMHG | HEIGHT: 66 IN | BODY MASS INDEX: 32.62 KG/M2 | WEIGHT: 203 LBS

## 2020-04-03 DIAGNOSIS — M77.11 LATERAL EPICONDYLITIS OF BOTH ELBOWS: Primary | ICD-10-CM

## 2020-04-03 DIAGNOSIS — M77.12 LATERAL EPICONDYLITIS OF BOTH ELBOWS: Primary | ICD-10-CM

## 2020-04-03 PROCEDURE — 4004F PT TOBACCO SCREEN RCVD TLK: CPT | Performed by: ORTHOPAEDIC SURGERY

## 2020-04-03 PROCEDURE — 20551 NJX 1 TENDON ORIGIN/INSJ: CPT | Performed by: ORTHOPAEDIC SURGERY

## 2020-04-03 PROCEDURE — 3077F SYST BP >= 140 MM HG: CPT | Performed by: ORTHOPAEDIC SURGERY

## 2020-04-03 PROCEDURE — 99213 OFFICE O/P EST LOW 20 MIN: CPT | Performed by: ORTHOPAEDIC SURGERY

## 2020-04-03 PROCEDURE — 3008F BODY MASS INDEX DOCD: CPT | Performed by: ORTHOPAEDIC SURGERY

## 2020-04-03 PROCEDURE — 3080F DIAST BP >= 90 MM HG: CPT | Performed by: ORTHOPAEDIC SURGERY

## 2020-04-03 RX ORDER — LIDOCAINE HYDROCHLORIDE 10 MG/ML
1 INJECTION, SOLUTION INFILTRATION; PERINEURAL
Status: COMPLETED | OUTPATIENT
Start: 2020-04-03 | End: 2020-04-03

## 2020-04-03 RX ORDER — TRIAMCINOLONE ACETONIDE 40 MG/ML
40 INJECTION, SUSPENSION INTRA-ARTICULAR; INTRAMUSCULAR
Status: COMPLETED | OUTPATIENT
Start: 2020-04-03 | End: 2020-04-03

## 2020-04-03 RX ADMIN — TRIAMCINOLONE ACETONIDE 40 MG: 40 INJECTION, SUSPENSION INTRA-ARTICULAR; INTRAMUSCULAR at 09:05

## 2020-04-03 RX ADMIN — LIDOCAINE HYDROCHLORIDE 1 ML: 10 INJECTION, SOLUTION INFILTRATION; PERINEURAL at 09:05

## 2020-04-16 ENCOUNTER — TELEPHONE (OUTPATIENT)
Dept: OBGYN CLINIC | Facility: CLINIC | Age: 45
End: 2020-04-16

## 2020-09-25 ENCOUNTER — OFFICE VISIT (OUTPATIENT)
Dept: OBGYN CLINIC | Facility: CLINIC | Age: 45
End: 2020-09-25
Payer: COMMERCIAL

## 2020-09-25 VITALS
WEIGHT: 203 LBS | HEIGHT: 66 IN | TEMPERATURE: 98.6 F | SYSTOLIC BLOOD PRESSURE: 150 MMHG | DIASTOLIC BLOOD PRESSURE: 90 MMHG | BODY MASS INDEX: 32.62 KG/M2 | HEART RATE: 70 BPM

## 2020-09-25 DIAGNOSIS — M77.11 LATERAL EPICONDYLITIS OF BOTH ELBOWS: Primary | ICD-10-CM

## 2020-09-25 DIAGNOSIS — M77.12 LATERAL EPICONDYLITIS OF BOTH ELBOWS: Primary | ICD-10-CM

## 2020-09-25 PROCEDURE — 1036F TOBACCO NON-USER: CPT | Performed by: ORTHOPAEDIC SURGERY

## 2020-09-25 PROCEDURE — 3080F DIAST BP >= 90 MM HG: CPT | Performed by: ORTHOPAEDIC SURGERY

## 2020-09-25 PROCEDURE — 99213 OFFICE O/P EST LOW 20 MIN: CPT | Performed by: ORTHOPAEDIC SURGERY

## 2020-09-25 PROCEDURE — 20605 DRAIN/INJ JOINT/BURSA W/O US: CPT | Performed by: ORTHOPAEDIC SURGERY

## 2020-09-25 RX ORDER — TRIAMCINOLONE ACETONIDE 40 MG/ML
40 INJECTION, SUSPENSION INTRA-ARTICULAR; INTRAMUSCULAR
Status: COMPLETED | OUTPATIENT
Start: 2020-09-25 | End: 2020-09-25

## 2020-09-25 RX ORDER — LIDOCAINE HYDROCHLORIDE 10 MG/ML
1 INJECTION, SOLUTION INFILTRATION; PERINEURAL
Status: COMPLETED | OUTPATIENT
Start: 2020-09-25 | End: 2020-09-25

## 2020-09-25 RX ADMIN — TRIAMCINOLONE ACETONIDE 40 MG: 40 INJECTION, SUSPENSION INTRA-ARTICULAR; INTRAMUSCULAR at 09:13

## 2020-09-25 RX ADMIN — LIDOCAINE HYDROCHLORIDE 1 ML: 10 INJECTION, SOLUTION INFILTRATION; PERINEURAL at 09:13

## 2020-09-25 NOTE — PROGRESS NOTES
CHIEF COMPLAINT:  Chief Complaint   Patient presents with    Left Elbow - Follow-up    Right Elbow - Follow-up       SUBJECTIVE:  Jen Matthews is a 39y o  year old  male who was previously seen for bilateral lateral epicondylitis where he received bilateral cortisone steroid injections 01/07/2019 and 12/23/2019 that did provide him with lengthy relief on the left although he previously reported that the right cortisone injection did not take  Patient was also seen by Dr Lisa Flores for the same condition who advised therapy but the patient refused to attend  Patient was most recently seen in the office 04/03/2020 at which time he received a right lateral epicondylitis cortisone steroid injection without  Complications  Today patient states that he has had recent contusion to the left elbow with resisted wrist pain  He also reports pain in the right elbow does continue to build over time  He denies any associated bruising, swelling, numbness, or tingling  PAST MEDICAL HISTORY:  Past Medical History:   Diagnosis Date    Diverticulitis     Hyperlipemia     Hypertension     Prediabetes     Psoriasis        PAST SURGICAL HISTORY:  Past Surgical History:   Procedure Laterality Date    COLONOSCOPY      [place] COLONOSCOPY FLX DX W/COLLJ SPEC WHEN PFRMD N/A 4/3/2019    Procedure: COLONOSCOPY;  Surgeon: Mani Faulkner MD;  [facility];   Service: Colorectal    [place] LAP,SURG,COLECTOMY, PARTIAL, W/ANAST N/A 1/8/2020    Procedure: RESECTION COLON SIGMOID LAPAROSCOPIC, LAPAROSCOPIC SPLENIC FLEXURE MOBILIZATION;  Surgeon: Mani Faulkner MD;  [facility];  Service: Colorectal    [place] SIGMOIDOSCOPY FLX DX W/COLLJ SPEC BR/WA IF PFRMD N/A 1/8/2020    Procedure: Yanci Bauer;  Surgeon: Mani Faulkner MD;  [facility];  Service: Colorectal       FAMILY HISTORY:  Family History   Problem Relation Age of Onset    Diabetes Maternal Grandmother     Diabetes Paternal Grandfather     Colon cancer Neg Hx        SOCIAL HISTORY:  Social History     Tobacco Use    Smoking status: Former Smoker     Packs/day: 0 00     Years: 0 00     Pack years: 0 00    Smokeless tobacco: Current User     Types: Chew   Substance Use Topics    Alcohol use: Yes     Comment: occasional alcohol use    Drug use: No       MEDICATIONS:    Current Outpatient Medications:     albuterol (PROVENTIL HFA,VENTOLIN HFA) 90 mcg/act inhaler, Inhale 2 puffs every 6 (six) hours as needed for wheezing or shortness of breath, Disp: 1 Inhaler, Rfl: 0    budesonide-formoterol (SYMBICORT) 80-4 5 MCG/ACT inhaler, Inhale 2 puffs 2 (two) times a day Rinse mouth after use  (Patient taking differently: Inhale 2 puffs as needed Rinse mouth after use ), Disp: 1 Inhaler, Rfl: 2    Cholecalciferol (VITAMIN D) 2000 units CAPS, Take 1 capsule by mouth daily, Disp: , Rfl:     Hydrocortisone Butyrate 0 1 % CREA, as needed , Disp: , Rfl: 3    losartan (COZAAR) 50 mg tablet, Take 1 tablet (50 mg total) by mouth daily, Disp: 90 tablet, Rfl: 1    Multiple Vitamin (MULTIVITAMIN) capsule, Take 1 capsule by mouth daily, Disp: , Rfl:     rosuvastatin (CRESTOR) 5 mg tablet, Take 1 tablet (5 mg total) by mouth daily, Disp: 90 tablet, Rfl: 1    Ustekinumab (STELARA SC), Inject under the skin every 3 (three) months , Disp: , Rfl:     ALLERGIES:  No Known Allergies    REVIEW OF SYSTEMS:  Review of Systems   Constitutional: Negative for chills, fever and unexpected weight change  HENT: Negative for hearing loss, nosebleeds and sore throat  Eyes: Negative for pain, redness and visual disturbance  Respiratory: Negative for cough, shortness of breath and wheezing  Cardiovascular: Negative for chest pain, palpitations and leg swelling  Gastrointestinal: Negative for abdominal pain, nausea and vomiting  Endocrine: Negative for polydipsia and polyuria  Genitourinary: Negative for dysuria and hematuria     Skin: Negative for rash and wound  Neurological: Negative for dizziness, light-headedness and headaches  Psychiatric/Behavioral: Negative for decreased concentration, dysphoric mood and suicidal ideas  The patient is not nervous/anxious  VITALS:  Vitals:    09/25/20 0855   BP: 150/90   Pulse: 70   Temp: 98 6 °F (37 °C)       LABS:  HgA1c:   Lab Results   Component Value Date    HGBA1C 6 3 01/02/2020     BMP:   Lab Results   Component Value Date    CALCIUM 9 6 03/03/2020    K 4 7 03/03/2020    CO2 28 03/03/2020     03/03/2020    BUN 16 03/03/2020    CREATININE 1 04 03/03/2020       _____________________________________________________  PHYSICAL EXAMINATION:  General: well developed and well nourished, alert, oriented times 3 and appears comfortable  Psychiatric: Normal  HEENT: Trachea Midline, No torticollis  Pulmonary: No audible wheezing or strider  Cardiovascular: No discernable arrhythmia   Skin: No masses, erythema, lacerations, fluctation, ulcerations  Neurovascular: Sensation Intact to the Median, Ulnar, Radial Nerve, Motor Intact to the Median, Ulnar, Radial Nerve and Pulses Intact    MUSCULOSKELETAL EXAMINATION:  Bilateral  Elbow:    No swelling or deformity  Full range of motion with flexion, extension, supination and pronation  Positive tenderness to palpation over the Lateral Epicondyle  Pain with passive flexion of the wrist with elbow fully extended  Pain with resisted wrist extension with elbow fully extended     ___________________________________________________  STUDIES REVIEWED:  No studies reviewed         PROCEDURES PERFORMED:  Medium joint arthrocentesis: R elbow  Date/Time: 9/25/2020 9:13 AM  Consent given by: patient  Site marked: site marked  Supporting Documentation  Indications: pain   Procedure Details  Location: elbow - R elbow  Preparation: Patient was prepped and draped in the usual sterile fashion  Needle size: 25 G  Ultrasound guidance: no  Approach: anterolateral  Medications administered: 1 mL lidocaine 1 %; 40 mg triamcinolone acetonide 40 mg/mL    Patient tolerance: patient tolerated the procedure well with no immediate complications  Dressing:  Sterile dressing applied    Medium joint arthrocentesis: L elbow  Date/Time: 9/25/2020 9:13 AM  Consent given by: patient  Site marked: site marked  Supporting Documentation  Indications: pain   Procedure Details  Location: elbow - L elbow  Preparation: Patient was prepped and draped in the usual sterile fashion  Needle size: 25 G  Ultrasound guidance: no  Approach: anterolateral  Medications administered: 1 mL lidocaine 1 %; 40 mg triamcinolone acetonide 40 mg/mL    Patient tolerance: patient tolerated the procedure well with no immediate complications  Dressing:  Sterile dressing applied            _____________________________________________________  ASSESSMENT/PLAN:  Bilateral lateral epicondylitis     CSI was offered and accepted    CSI was administered without complications   Pt was shown and provided with HEP   Pt was advised to apply heat prior to HEP   Pt will follow up in the office in 2 weeks for reevaluation        Follow Up:  Return if symptoms worsen or fail to improve, for Re-evaluation      Work/school status:        Scribe Attestation    I,:   Leia January am acting as a scribe while in the presence of the attending physician :        I,:   Isma Cabrera MD personally performed the services described in this documentation    as scribed in my presence :

## 2020-09-27 DIAGNOSIS — E78.5 HYPERLIPIDEMIA, UNSPECIFIED HYPERLIPIDEMIA TYPE: ICD-10-CM

## 2020-09-29 RX ORDER — ROSUVASTATIN CALCIUM 5 MG/1
TABLET, COATED ORAL
Qty: 90 TABLET | Refills: 1 | OUTPATIENT
Start: 2020-09-29

## 2020-12-03 ENCOUNTER — TELEPHONE (OUTPATIENT)
Dept: FAMILY MEDICINE CLINIC | Facility: CLINIC | Age: 45
End: 2020-12-03

## 2020-12-03 LAB
CHOLEST SERPL-MCNC: 249 MG/DL
CHOLEST/HDLC SERPL: 6.6 (CALC)
HBA1C MFR BLD: 7.1 % OF TOTAL HGB
HDLC SERPL-MCNC: 38 MG/DL
LDLC SERPL CALC-MCNC: 170 MG/DL (CALC)
NONHDLC SERPL-MCNC: 211 MG/DL (CALC)
TRIGL SERPL-MCNC: 248 MG/DL

## 2020-12-03 PROCEDURE — 3051F HG A1C>EQUAL 7.0%<8.0%: CPT | Performed by: FAMILY MEDICINE

## 2020-12-16 ENCOUNTER — TELEMEDICINE (OUTPATIENT)
Dept: FAMILY MEDICINE CLINIC | Facility: CLINIC | Age: 45
End: 2020-12-16
Payer: COMMERCIAL

## 2020-12-16 VITALS — WEIGHT: 245 LBS | HEIGHT: 66 IN | BODY MASS INDEX: 39.37 KG/M2

## 2020-12-16 DIAGNOSIS — M79.672 LEFT FOOT PAIN: ICD-10-CM

## 2020-12-16 DIAGNOSIS — E11.9 NEW ONSET TYPE 2 DIABETES MELLITUS (HCC): ICD-10-CM

## 2020-12-16 DIAGNOSIS — E11.9 CONTROLLED TYPE 2 DIABETES MELLITUS WITHOUT COMPLICATION, WITHOUT LONG-TERM CURRENT USE OF INSULIN (HCC): ICD-10-CM

## 2020-12-16 DIAGNOSIS — I10 ESSENTIAL HYPERTENSION: Primary | ICD-10-CM

## 2020-12-16 DIAGNOSIS — E78.5 HYPERLIPIDEMIA, UNSPECIFIED HYPERLIPIDEMIA TYPE: ICD-10-CM

## 2020-12-16 PROBLEM — R20.0 NUMBNESS OF ARM: Status: RESOLVED | Noted: 2019-12-13 | Resolved: 2020-12-16

## 2020-12-16 PROBLEM — J40 BRONCHITIS: Status: RESOLVED | Noted: 2019-12-13 | Resolved: 2020-12-16

## 2020-12-16 PROBLEM — R73.03 PREDIABETES: Status: RESOLVED | Noted: 2018-12-05 | Resolved: 2020-12-16

## 2020-12-16 PROCEDURE — 3725F SCREEN DEPRESSION PERFORMED: CPT | Performed by: FAMILY MEDICINE

## 2020-12-16 PROCEDURE — 1036F TOBACCO NON-USER: CPT | Performed by: FAMILY MEDICINE

## 2020-12-16 PROCEDURE — 3008F BODY MASS INDEX DOCD: CPT | Performed by: FAMILY MEDICINE

## 2020-12-16 PROCEDURE — 99214 OFFICE O/P EST MOD 30 MIN: CPT | Performed by: FAMILY MEDICINE

## 2020-12-16 PROCEDURE — 4010F ACE/ARB THERAPY RXD/TAKEN: CPT | Performed by: FAMILY MEDICINE

## 2020-12-16 RX ORDER — LOSARTAN POTASSIUM 50 MG/1
50 TABLET ORAL DAILY
Qty: 90 TABLET | Refills: 0 | Status: SHIPPED | OUTPATIENT
Start: 2020-12-16 | End: 2021-04-05 | Stop reason: SDUPTHER

## 2020-12-16 RX ORDER — ROSUVASTATIN CALCIUM 5 MG/1
5 TABLET, COATED ORAL DAILY
Qty: 90 TABLET | Refills: 1 | Status: SHIPPED | OUTPATIENT
Start: 2020-12-16 | End: 2021-04-05 | Stop reason: SDUPTHER

## 2021-01-04 ENCOUNTER — TELEMEDICINE (OUTPATIENT)
Dept: FAMILY MEDICINE CLINIC | Facility: CLINIC | Age: 46
End: 2021-01-04
Payer: COMMERCIAL

## 2021-01-04 VITALS
HEIGHT: 66 IN | DIASTOLIC BLOOD PRESSURE: 80 MMHG | BODY MASS INDEX: 37.28 KG/M2 | SYSTOLIC BLOOD PRESSURE: 130 MMHG | WEIGHT: 232 LBS

## 2021-01-04 DIAGNOSIS — E11.9 NEW ONSET TYPE 2 DIABETES MELLITUS (HCC): ICD-10-CM

## 2021-01-04 DIAGNOSIS — I10 ESSENTIAL HYPERTENSION: Primary | ICD-10-CM

## 2021-01-04 DIAGNOSIS — G47.30 SLEEP APNEA, UNSPECIFIED TYPE: ICD-10-CM

## 2021-01-04 PROBLEM — F17.200 SMOKER: Status: RESOLVED | Noted: 2018-12-05 | Resolved: 2021-01-04

## 2021-01-04 PROCEDURE — 99214 OFFICE O/P EST MOD 30 MIN: CPT | Performed by: FAMILY MEDICINE

## 2021-01-04 PROCEDURE — 3008F BODY MASS INDEX DOCD: CPT | Performed by: FAMILY MEDICINE

## 2021-01-04 PROCEDURE — 1036F TOBACCO NON-USER: CPT | Performed by: FAMILY MEDICINE

## 2021-01-04 PROCEDURE — 3079F DIAST BP 80-89 MM HG: CPT | Performed by: FAMILY MEDICINE

## 2021-01-04 PROCEDURE — 3725F SCREEN DEPRESSION PERFORMED: CPT | Performed by: FAMILY MEDICINE

## 2021-01-04 PROCEDURE — 3075F SYST BP GE 130 - 139MM HG: CPT | Performed by: FAMILY MEDICINE

## 2021-01-04 NOTE — PROGRESS NOTES
Virtual Regular Visit      Assessment/Plan:    Problem List Items Addressed This Visit        Endocrine    New onset type 2 diabetes mellitus ([facility] [address] )       Lab Results   Component Value Date    HGBA1C 7 1 (H) 12/02/2020     Patient tolerating metformin without any side effects  Currently on 500 milligram b i d  A1c due in March  Has made significant changes to lifestyle  Trying to lose weight  Advised to continue same  Respiratory    Sleep apnea     Recommended sleep study  Relevant Orders    Ambulatory referral to Sleep Medicine       Cardiovascular and Mediastinum    Essential hypertension - Primary     Patient's blood pressure is at goal   Continue losartan 50 milligram   Patient will continue monitoring his blood pressure at home  If the blood pressure is persistently above 130/80 then patient will follow up  Reason for visit is   Chief Complaint   Patient presents with    Follow-up    Virtual Regular Visit        Encounter provider Kam Campo MD    Provider located at [address] [place] [id number]      Recent Visits  No visits were found meeting these conditions  Showing recent visits within past 7 days and meeting all other requirements     Today's Visits  Date Type Provider Dept   01/04/21 Telemedicine Kam Campo MD [facility]   Showing today's visits and meeting all other requirements     Future Appointments  No visits were found meeting these conditions  Showing future appointments within next 150 days and meeting all other requirements        The patient was identified by name and date of birth  Orman Holiday was informed that this is a telemedicine visit and that the visit is being conducted through [facility] and patient was informed that this is a secure, HIPAA-compliant platform  He agrees to proceed     My office door was closed  No one else was in the room    He acknowledged consent and understanding of privacy and security of the video platform  The patient has agreed to participate and understands they can discontinue the visit at any time  Patient is aware this is a billable service  Subjective  Daxa Miranda is a 39 y o  male    Hypertension  This is a chronic problem  The current episode started more than 1 year ago  The problem is controlled  Pertinent negatives include no chest pain, headaches, palpitations, peripheral edema or shortness of breath  Risk factors for coronary artery disease include dyslipidemia, diabetes mellitus, male gender and obesity  Treatments tried: Losartan 50 milligram  The current treatment provides significant improvement  There are no compliance problems  Diabetes  He presents for his initial (New onset) diabetic visit  He has type 2 diabetes mellitus  Disease course: A1c 7 1  Pertinent negatives for hypoglycemia include no headaches  Pertinent negatives for diabetes include no chest pain, no polydipsia, no polyphagia, no polyuria and no visual change  There are no hypoglycemic complications  There are no diabetic complications  Risk factors for coronary artery disease include dyslipidemia, diabetes mellitus, hypertension, male sex and obesity  Current diabetic treatments: Metformin 500 milligram b i d  He is compliant with treatment all of the time  He is following a generally healthy diet  Meal planning includes avoidance of concentrated sweets and calorie counting  He participates in exercise daily  An ACE inhibitor/angiotensin II receptor blocker is being taken  patient monitoring his blood pressure at home  States that morning blood pressure is typically high, [address]  Blood pressure improves to 120/70 later in the day When asked, states that snores, and that his sleep quality is poor      Past Medical History:   Diagnosis Date    Diverticulitis     Hyperlipemia     Hypertension     Prediabetes     Psoriasis        Past Surgical History:   Procedure Laterality Date    COLONOSCOPY      [facility] COLONOSCOPY FLX DX W/COLLJ SPEC WHEN PFRMD N/A 4/3/2019    Procedure: COLONOSCOPY;  Surgeon: Levi Robles MD;  [facility]; Service: Colorectal    VA LAP,SURG,COLECTOMY, PARTIAL, W/ANAST N/A 1/8/2020    Procedure: RESECTION COLON SIGMOID LAPAROSCOPIC, LAPAROSCOPIC SPLENIC FLEXURE MOBILIZATION;  Surgeon: Levi Robles MD;  [facility]: [facility];  Service: Colorectal    VA SIGMOIDOSCOPY FLX DX W/COLLJ SPEC BR/WA IF PFRMD N/A 1/8/2020    Procedure: SIGMOIDOSCOPY FLEXIBLE;  Surgeon: Levi Robles MD;  [facility];  Service: Colorectal       Current Outpatient Medications   Medication Sig Dispense Refill    albuterol (PROVENTIL HFA,VENTOLIN HFA) 90 mcg/act inhaler Inhale 2 puffs every 6 (six) hours as needed for wheezing or shortness of breath 1 Inhaler 0    budesonide-formoterol (SYMBICORT) 80-4 5 MCG/ACT inhaler Inhale 2 puffs 2 (two) times a day Rinse mouth after use  (Patient taking differently: Inhale 2 puffs as needed Rinse mouth after use ) 1 Inhaler 2    Cholecalciferol (VITAMIN D) 2000 units CAPS Take 1 capsule by mouth daily      Hydrocortisone Butyrate 0 1 % CREA as needed   3    losartan (COZAAR) 50 mg tablet Take 1 tablet (50 mg total) by mouth daily 90 tablet 0    metFORMIN (GLUCOPHAGE) 500 mg tablet Take 1 tablet (500 mg total) by mouth 2 (two) times a day with meals 60 tablet 0    rosuvastatin (CRESTOR) 5 mg tablet Take 1 tablet (5 mg total) by mouth daily 90 tablet 1    Ustekinumab (STELARA SC) Inject under the skin every 3 (three) months        No current facility-administered medications for this visit  No Known Allergies    Review of Systems   Constitutional: Negative  Respiratory: Negative  Negative for shortness of breath  Snores   Cardiovascular: Negative  Negative for chest pain and palpitations  Gastrointestinal: Negative  Endocrine: Negative  Negative for polydipsia, polyphagia and polyuria  Genitourinary: Negative  Musculoskeletal: Negative  Negative for myalgias  Allergic/Immunologic: Negative  Neurological: Negative  Negative for headaches  Psychiatric/Behavioral: Negative  Video Exam    Vitals:    01/04/21 0756   BP: 130/80   Weight: 105 kg (232 lb)   Height: 5' 6" (1 676 m)       Physical Exam  Constitutional:       General: He is not in acute distress  Appearance: He is well-developed  Pulmonary:      Effort: Pulmonary effort is normal  No respiratory distress  Neurological:      Mental Status: He is alert and oriented to person, place, and time  Psychiatric:         Behavior: Behavior normal          Thought Content: Thought content normal          Judgment: Judgment normal           I spent 25 minutes with patient today in which greater than 50% of the time was spent in counseling/coordination of care regarding Management of symptoms, reviewing labs  VIRTUAL VISIT DISCLAIMER    Per Mina acknowledges that he has consented to an online visit or consultation  He understands that the online visit is based solely on information provided by him, and that, in the absence of a face-to-face physical evaluation by the physician, the diagnosis he receives is both limited and provisional in terms of accuracy and completeness  This is not intended to replace a full medical face-to-face evaluation by the physician  Per Mina understands and accepts these terms

## 2021-01-04 NOTE — ASSESSMENT & PLAN NOTE
Patient's blood pressure is at goal   Continue losartan 50 milligram   Patient will continue monitoring his blood pressure at home  If the blood pressure is persistently above 130/80 then patient will follow up

## 2021-01-04 NOTE — ASSESSMENT & PLAN NOTE
Lab Results   Component Value Date    HGBA1C 7 1 (H) 12/02/2020     Patient tolerating metformin without any side effects  Currently on 500 milligram b i d  A1c due in March  Has made significant changes to lifestyle  Trying to lose weight  Advised to continue same

## 2021-01-31 DIAGNOSIS — E11.9 CONTROLLED TYPE 2 DIABETES MELLITUS WITHOUT COMPLICATION, WITHOUT LONG-TERM CURRENT USE OF INSULIN (HCC): ICD-10-CM

## 2021-03-04 DIAGNOSIS — E11.9 CONTROLLED TYPE 2 DIABETES MELLITUS WITHOUT COMPLICATION, WITHOUT LONG-TERM CURRENT USE OF INSULIN (HCC): ICD-10-CM

## 2021-03-25 LAB
ALBUMIN SERPL-MCNC: 4.6 G/DL (ref 3.6–5.1)
ALBUMIN/CREAT UR: NORMAL MCG/MG CREAT
ALBUMIN/GLOB SERPL: 1.9 (CALC) (ref 1–2.5)
ALP SERPL-CCNC: 85 U/L (ref 36–130)
ALT SERPL-CCNC: 48 U/L (ref 9–46)
AST SERPL-CCNC: 26 U/L (ref 10–40)
BILIRUB SERPL-MCNC: 0.4 MG/DL (ref 0.2–1.2)
BUN SERPL-MCNC: 13 MG/DL (ref 7–25)
BUN/CREAT SERPL: ABNORMAL (CALC) (ref 6–22)
CALCIUM SERPL-MCNC: 9.8 MG/DL (ref 8.6–10.3)
CHLORIDE SERPL-SCNC: 104 MMOL/L (ref 98–110)
CHOLEST SERPL-MCNC: 141 MG/DL
CHOLEST/HDLC SERPL: 4.3 (CALC)
CO2 SERPL-SCNC: 29 MMOL/L (ref 20–32)
CREAT SERPL-MCNC: 0.87 MG/DL (ref 0.6–1.35)
CREAT UR-MCNC: 20 MG/DL (ref 20–320)
GLOBULIN SER CALC-MCNC: 2.4 G/DL (CALC) (ref 1.9–3.7)
GLUCOSE SERPL-MCNC: 104 MG/DL (ref 65–99)
HBA1C MFR BLD: 6 % OF TOTAL HGB
HDLC SERPL-MCNC: 33 MG/DL
LDLC SERPL CALC-MCNC: 82 MG/DL (CALC)
MICROALBUMIN UR-MCNC: <0.2 MG/DL
NONHDLC SERPL-MCNC: 108 MG/DL (CALC)
POTASSIUM SERPL-SCNC: 4.3 MMOL/L (ref 3.5–5.3)
PROT SERPL-MCNC: 7 G/DL (ref 6.1–8.1)
SL AMB EGFR AFRICAN AMERICAN: 122 ML/MIN/1.73M2
SL AMB EGFR NON AFRICAN AMERICAN: 105 ML/MIN/1.73M2
SODIUM SERPL-SCNC: 141 MMOL/L (ref 135–146)
TRIGL SERPL-MCNC: 163 MG/DL

## 2021-03-25 PROCEDURE — 3044F HG A1C LEVEL LT 7.0%: CPT | Performed by: FAMILY MEDICINE

## 2021-03-30 DIAGNOSIS — Z23 ENCOUNTER FOR IMMUNIZATION: ICD-10-CM

## 2021-04-04 DIAGNOSIS — E11.9 CONTROLLED TYPE 2 DIABETES MELLITUS WITHOUT COMPLICATION, WITHOUT LONG-TERM CURRENT USE OF INSULIN (HCC): ICD-10-CM

## 2021-04-05 ENCOUNTER — OFFICE VISIT (OUTPATIENT)
Dept: FAMILY MEDICINE CLINIC | Facility: CLINIC | Age: 46
End: 2021-04-05
Payer: COMMERCIAL

## 2021-04-05 VITALS
HEIGHT: 66 IN | DIASTOLIC BLOOD PRESSURE: 78 MMHG | OXYGEN SATURATION: 98 % | RESPIRATION RATE: 18 BRPM | HEART RATE: 97 BPM | SYSTOLIC BLOOD PRESSURE: 124 MMHG | BODY MASS INDEX: 35.48 KG/M2 | WEIGHT: 220.8 LBS

## 2021-04-05 DIAGNOSIS — Z12.5 SCREENING FOR PROSTATE CANCER: ICD-10-CM

## 2021-04-05 DIAGNOSIS — L40.9 PSORIASIS: ICD-10-CM

## 2021-04-05 DIAGNOSIS — E11.9 CONTROLLED TYPE 2 DIABETES MELLITUS WITHOUT COMPLICATION, WITHOUT LONG-TERM CURRENT USE OF INSULIN (HCC): Primary | ICD-10-CM

## 2021-04-05 DIAGNOSIS — E78.5 HYPERLIPIDEMIA, UNSPECIFIED HYPERLIPIDEMIA TYPE: ICD-10-CM

## 2021-04-05 DIAGNOSIS — I10 ESSENTIAL HYPERTENSION: ICD-10-CM

## 2021-04-05 DIAGNOSIS — E11.9 NEW ONSET TYPE 2 DIABETES MELLITUS (HCC): ICD-10-CM

## 2021-04-05 DIAGNOSIS — J45.40 MODERATE PERSISTENT ASTHMA WITHOUT COMPLICATION: ICD-10-CM

## 2021-04-05 PROCEDURE — 3725F SCREEN DEPRESSION PERFORMED: CPT | Performed by: FAMILY MEDICINE

## 2021-04-05 PROCEDURE — 3008F BODY MASS INDEX DOCD: CPT | Performed by: FAMILY MEDICINE

## 2021-04-05 PROCEDURE — 1036F TOBACCO NON-USER: CPT | Performed by: FAMILY MEDICINE

## 2021-04-05 PROCEDURE — 3078F DIAST BP <80 MM HG: CPT | Performed by: FAMILY MEDICINE

## 2021-04-05 PROCEDURE — 99214 OFFICE O/P EST MOD 30 MIN: CPT | Performed by: FAMILY MEDICINE

## 2021-04-05 PROCEDURE — 4010F ACE/ARB THERAPY RXD/TAKEN: CPT | Performed by: FAMILY MEDICINE

## 2021-04-05 PROCEDURE — 3074F SYST BP LT 130 MM HG: CPT | Performed by: FAMILY MEDICINE

## 2021-04-05 RX ORDER — ROSUVASTATIN CALCIUM 5 MG/1
5 TABLET, COATED ORAL DAILY
Qty: 90 TABLET | Refills: 1 | Status: SHIPPED | OUTPATIENT
Start: 2021-04-05 | End: 2021-08-23 | Stop reason: SDUPTHER

## 2021-04-05 RX ORDER — LOSARTAN POTASSIUM 50 MG/1
50 TABLET ORAL DAILY
Qty: 90 TABLET | Refills: 1 | Status: SHIPPED | OUTPATIENT
Start: 2021-04-05 | End: 2021-08-23 | Stop reason: SDUPTHER

## 2021-04-05 NOTE — ASSESSMENT & PLAN NOTE
Lab Results   Component Value Date    HGBA1C 6 0 (H) 03/24/2021     A1c improved significantly on lifestyle modifications  Continue metformin 500 milligram daily

## 2021-04-05 NOTE — PROGRESS NOTES
Subjective:      Patient ID: Indio Little is a 42 y o  male  Diabetes  He presents for his follow-up diabetic visit  He has type 2 diabetes mellitus  His disease course has been stable  There are no hypoglycemic associated symptoms  Pertinent negatives for hypoglycemia include no headaches  There are no diabetic associated symptoms  Pertinent negatives for diabetes include no chest pain, no polydipsia and no polyphagia  There are no hypoglycemic complications  Symptoms are improving (A1c 6 0)  There are no diabetic complications  Current diabetic treatments: Metformin 500 milligrams daily  He is compliant with treatment all of the time  He participates in exercise daily  An ACE inhibitor/angiotensin II receptor blocker is being taken  Eye exam is current  Hypertension  This is a chronic problem  The current episode started more than 1 year ago  Pertinent negatives include no chest pain, headaches, palpitations or shortness of breath  Risk factors for coronary artery disease include dyslipidemia, diabetes mellitus and male gender  Treatments tried: Losartan 50 milligram  The current treatment provides significant improvement  There are no compliance problems  Hyperlipidemia  This is a chronic problem  The current episode started more than 1 year ago  The problem is controlled  Recent lipid tests were reviewed and are normal  Pertinent negatives include no chest pain, myalgias or shortness of breath  Current antihyperlipidemic treatment includes statins (Crestor 5 milligram)  The current treatment provides significant improvement of lipids  There are no compliance problems  Risk factors for coronary artery disease include diabetes mellitus, dyslipidemia, hypertension and male sex  Asthma  There is no shortness of breath or wheezing  This is a chronic problem  The current episode started more than 1 year ago  The problem has been gradually improving   Pertinent negatives include no chest pain, headaches or myalgias  His symptoms are aggravated by pollen, change in weather and exposure to smoke  Relieved by: Symbicort, albuterol  He reports significant (After patient quit smoking) improvement on treatment  Risk factors for lung disease include smoking/tobacco exposure  His past medical history is significant for asthma  Past Medical History:   Diagnosis Date    Diverticulitis     Hyperlipemia     Hypertension     Prediabetes     Psoriasis        Family History   Problem Relation Age of Onset    Diabetes Maternal Grandmother     Diabetes Paternal Grandfather     Colon cancer Neg Hx        Past Surgical History:   Procedure Laterality Date    COLONOSCOPY      [facility] COLONOSCOPY FLX DX W/COLLJ SPEC WHEN PFRMD N/A 4/3/2019    Procedure: COLONOSCOPY;  Surgeon: Anel Richter MD;  [facility]; Service: Colorectal    ND LAP,SURG,COLECTOMY, PARTIAL, W/ANAST N/A 1/8/2020    Procedure: RESECTION COLON SIGMOID LAPAROSCOPIC, LAPAROSCOPIC SPLENIC FLEXURE MOBILIZATION;  Surgeon: Anel Richter MD;  [facility];  Service: Colorectal    ND SIGMOIDOSCOPY FLX DX W/COLLJ SPEC BR/WA IF PFRMD N/A 1/8/2020    Procedure: SIGMOIDOSCOPY FLEXIBLE;  Surgeon: Anel Richter MD;  [facility];  Service: Colorectal        reports that he has quit smoking  He smoked 0 00 packs per day for 0 00 years  His smokeless tobacco use includes chew  He reports current alcohol use  He reports that he does not use drugs  Current Outpatient Medications:     albuterol (PROVENTIL HFA,VENTOLIN HFA) 90 mcg/act inhaler, Inhale 2 puffs every 6 (six) hours as needed for wheezing or shortness of breath, Disp: 1 Inhaler, Rfl: 0    budesonide-formoterol (SYMBICORT) 80-4 5 MCG/ACT inhaler, Inhale 2 puffs 2 (two) times a day Rinse mouth after use   (Patient taking differently: Inhale 2 puffs as needed Rinse mouth after use ), Disp: 1 Inhaler, Rfl: 2    Cholecalciferol (VITAMIN D) 2000 units CAPS, Take 1 capsule by mouth daily, Disp: , Rfl:     Hydrocortisone Butyrate 0 1 % CREA, as needed , Disp: , Rfl: 3    losartan (COZAAR) 50 mg tablet, Take 1 tablet (50 mg total) by mouth daily, Disp: 90 tablet, Rfl: 1    rosuvastatin (CRESTOR) 5 mg tablet, Take 1 tablet (5 mg total) by mouth daily, Disp: 90 tablet, Rfl: 1    Ustekinumab (STELARA SC), Inject under the skin every 3 (three) months , Disp: , Rfl:     metFORMIN (GLUCOPHAGE) 500 mg tablet, Take 1 tablet (500 mg total) by mouth daily with breakfast, Disp: 90 tablet, Rfl: 1    The following portions of the patient's history were reviewed and updated as appropriate: allergies, current medications, past family history, past medical history, past social history, past surgical history and problem list     Review of Systems   Constitutional: Negative  Respiratory: Negative  Negative for shortness of breath and wheezing  Cardiovascular: Negative  Negative for chest pain and palpitations  Gastrointestinal: Negative  Endocrine: Negative  Negative for polydipsia and polyphagia  Genitourinary: Negative  Musculoskeletal: Negative  Negative for myalgias  Allergic/Immunologic: Negative  Neurological: Negative  Negative for headaches  Psychiatric/Behavioral: Negative  Objective:    /78   Pulse 97   Resp 18   Ht 5' 6" (1 676 m)   Wt 100 kg (220 lb 12 8 oz)   SpO2 98%   BMI 35 64 kg/m²      Physical Exam  Constitutional:       Appearance: He is well-developed  HENT:      Mouth/Throat:      Pharynx: No oropharyngeal exudate  Eyes:      Pupils: Pupils are equal, round, and reactive to light  Neck:      Musculoskeletal: Normal range of motion  Cardiovascular:      Rate and Rhythm: Normal rate and regular rhythm  Pulses: no weak pulses  Pulmonary:      Effort: Pulmonary effort is normal       Breath sounds: Normal breath sounds  Abdominal:      General: Bowel sounds are normal       Palpations: Abdomen is soft  Musculoskeletal: Normal range of motion  Feet:      Right foot:      Skin integrity: No ulcer, skin breakdown, erythema, warmth, callus or dry skin  Left foot:      Skin integrity: No ulcer, skin breakdown, erythema, warmth, callus or dry skin  Neurological:      Mental Status: He is alert and oriented to person, place, and time  Psychiatric:         Behavior: Behavior normal          Judgment: Judgment normal          Patient's shoes and socks removed  Right Foot/Ankle   Right Foot Inspection  Skin Exam: skin normal and skin intact no dry skin, no warmth, no callus, no erythema, no maceration, no abnormal color, no pre-ulcer, no ulcer and no callus                          Toe Exam: ROM and strength within normal limits  Sensory   Vibration: intact  Proprioception: intact   Monofilament testing: intact  Vascular  Capillary refills: < 3 seconds      Left Foot/Ankle  Left Foot Inspection  Skin Exam: skin normal and skin intactno dry skin, no warmth, no erythema, no maceration, normal color, no pre-ulcer, no ulcer and no callus                         Toe Exam: ROM and strength within normal limits                   Sensory   Vibration: intact  Proprioception: intact  Monofilament: intact  Vascular  Capillary refills: < 3 seconds    Assign Risk Category:  No deformity present; No loss of protective sensation;  No weak pulses       Risk: 0    Recent Results (from the past 1008 hour(s))   Lipid panel    Collection Time: 03/24/21 10:04 AM   Result Value Ref Range    Total Cholesterol 141 <200 mg/dL    HDL 33 (L) > OR = 40 mg/dL    Triglycerides 163 (H) <150 mg/dL    LDL Calculated 82 mg/dL (calc)    Chol HDLC Ratio 4 3 <5 0 (calc)    Non-HDL Cholesterol 108 <130 mg/dL (calc)   Comprehensive metabolic panel    Collection Time: 03/24/21 10:04 AM   Result Value Ref Range    Glucose, Random 104 (H) 65 - 99 mg/dL    BUN 13 7 - 25 mg/dL    Creatinine 0 87 0 60 - 1 35 mg/dL    eGFR Non  105 > OR = 60 mL/min/1 73m2    eGFR  122 > OR = 60 mL/min/1 73m2    SL AMB BUN/CREATININE RATIO NOT APPLICABLE 6 - 22 (calc)    Sodium 141 135 - 146 mmol/L    Potassium 4 3 3 5 - 5 3 mmol/L    Chloride 104 98 - 110 mmol/L    CO2 29 20 - 32 mmol/L    Calcium 9 8 8 6 - 10 3 mg/dL    Protein, Total 7 0 6 1 - 8 1 g/dL    Albumin 4 6 3 6 - 5 1 g/dL    Globulin 2 4 1 9 - 3 7 g/dL (calc)    Albumin/Globulin Ratio 1 9 1 0 - 2 5 (calc)    TOTAL BILIRUBIN 0 4 0 2 - 1 2 mg/dL    Alkaline Phosphatase 85 36 - 130 U/L    AST 26 10 - 40 U/L    ALT 48 (H) 9 - 46 U/L   Microalbumin, Random Urine (W/Creatinine)    Collection Time: 03/24/21 10:04 AM   Result Value Ref Range    Creatinine, Urine 20 20 - 320 mg/dL    Microalbum  ,U,Random <0 2 See Note: mg/dL    Microalb/Creat Ratio NOTE <30 mcg/mg creat   Hemoglobin A1c (w/out EAG)    Collection Time: 03/24/21 10:04 AM   Result Value Ref Range    Hemoglobin A1C 6 0 (H) <5 7 % of total Hgb       Assessment/Plan:         Problem List Items Addressed This Visit        Endocrine    New onset type 2 diabetes mellitus ([facility] [address] )       Lab Results   Component Value Date    HGBA1C 6 0 (H) 03/24/2021     A1c improved significantly on lifestyle modifications  Continue metformin 500 milligram daily  Relevant Medications    metFORMIN (GLUCOPHAGE) 500 mg tablet       Respiratory    Moderate persistent asthma without complication     Stable with intermittent use of Symbicort, albuterol  Continue same  Cardiovascular and Mediastinum    Essential hypertension     BP is at goal  Continue Losartan 50 mg daily         Relevant Medications    losartan (COZAAR) 50 mg tablet       Musculoskeletal and Integument    Psoriasis     Improved significantly on Stelara  Continue injections through Dermatology              Other    Hyperlipidemia     LDL is at goal   Continue Crestor 5 milligram         Relevant Medications    rosuvastatin (CRESTOR) 5 mg tablet    Other Relevant Orders    Lipid panel      Other Visit Diagnoses     Controlled type 2 diabetes mellitus without complication, without long-term current use of insulin (HCC)    -  Primary    Relevant Medications    metFORMIN (GLUCOPHAGE) 500 mg tablet    Other Relevant Orders    Diabetic foot exam    Comprehensive metabolic panel    Hemoglobin A1C    Screening for prostate cancer        Relevant Orders    PSA, Total Screen        BMI Counseling: Body mass index is 35 64 kg/m²  The BMI is above normal  Nutrition recommendations include reducing portion sizes, decreasing overall calorie intake and 3-5 servings of fruits/vegetables daily  Exercise recommendations include moderate aerobic physical activity for 150 minutes/week

## 2021-07-08 ENCOUNTER — OFFICE VISIT (OUTPATIENT)
Dept: OBGYN CLINIC | Facility: CLINIC | Age: 46
End: 2021-07-08
Payer: COMMERCIAL

## 2021-07-08 VITALS
SYSTOLIC BLOOD PRESSURE: 149 MMHG | HEIGHT: 66 IN | WEIGHT: 214 LBS | BODY MASS INDEX: 34.39 KG/M2 | DIASTOLIC BLOOD PRESSURE: 89 MMHG | HEART RATE: 64 BPM

## 2021-07-08 DIAGNOSIS — M77.11 LATERAL EPICONDYLITIS OF RIGHT ELBOW: Primary | ICD-10-CM

## 2021-07-08 PROCEDURE — 3008F BODY MASS INDEX DOCD: CPT | Performed by: ORTHOPAEDIC SURGERY

## 2021-07-08 PROCEDURE — 1036F TOBACCO NON-USER: CPT | Performed by: ORTHOPAEDIC SURGERY

## 2021-07-08 PROCEDURE — 99213 OFFICE O/P EST LOW 20 MIN: CPT | Performed by: ORTHOPAEDIC SURGERY

## 2021-07-08 PROCEDURE — 20551 NJX 1 TENDON ORIGIN/INSJ: CPT | Performed by: ORTHOPAEDIC SURGERY

## 2021-07-08 PROCEDURE — 3079F DIAST BP 80-89 MM HG: CPT | Performed by: ORTHOPAEDIC SURGERY

## 2021-07-08 PROCEDURE — 3077F SYST BP >= 140 MM HG: CPT | Performed by: ORTHOPAEDIC SURGERY

## 2021-07-08 RX ORDER — LIDOCAINE HYDROCHLORIDE 10 MG/ML
1 INJECTION, SOLUTION INFILTRATION; PERINEURAL
Status: COMPLETED | OUTPATIENT
Start: 2021-07-08 | End: 2021-07-08

## 2021-07-08 RX ORDER — TRIAMCINOLONE ACETONIDE 40 MG/ML
40 INJECTION, SUSPENSION INTRA-ARTICULAR; INTRAMUSCULAR
Status: COMPLETED | OUTPATIENT
Start: 2021-07-08 | End: 2021-07-08

## 2021-07-08 RX ADMIN — TRIAMCINOLONE ACETONIDE 40 MG: 40 INJECTION, SUSPENSION INTRA-ARTICULAR; INTRAMUSCULAR at 10:16

## 2021-07-08 RX ADMIN — LIDOCAINE HYDROCHLORIDE 1 ML: 10 INJECTION, SOLUTION INFILTRATION; PERINEURAL at 10:16

## 2021-07-08 NOTE — PROGRESS NOTES
CHIEF COMPLAINT:  Chief Complaint   Patient presents with    Left Elbow - Follow-up    Right Elbow - Follow-up       SUBJECTIVE:  Hoda Verduzco is a 42y o  year old male who presents for follow-up of bilateral lateral epicondylitis  When patient was last here on 09/25/2020 was provided with bilateral steroid injections for this  He states that he had significant benefit from the injections until about 1 month ago when his pain abruptly returned in the right elbow  His left elbow is still doing well with only occasional discomfort  He denies any associated injury or trauma  Denies numbness and tingling  Pain is worsened with motion of the elbow  He has not been taking any medications for pain  He would like to discuss right lateral epicondylitis release today and possible injection  Patient offers no additional complaints at this time  PAST MEDICAL HISTORY:  Past Medical History:   Diagnosis Date    Diverticulitis     Hyperlipemia     Hypertension     Prediabetes     Psoriasis        PAST SURGICAL HISTORY:  Past Surgical History:   Procedure Laterality Date    COLONOSCOPY      [place] COLONOSCOPY FLX DX W/COLLJ SPEC WHEN PFRMD N/A 4/3/2019    Procedure: COLONOSCOPY;  Surgeon: Irineo White MD;  [facility];   Service: Colorectal    [place] LAP,SURG,COLECTOMY, PARTIAL, W/ANAST N/A 1/8/2020    Procedure: RESECTION COLON SIGMOID LAPAROSCOPIC, LAPAROSCOPIC SPLENIC FLEXURE MOBILIZATION;  Surgeon: Irineo White MD;  [facility];  Service: Colorectal    [place] SIGMOIDOSCOPY FLX DX W/COLLJ SPEC BR/WA IF PFRMD N/A 1/8/2020    Procedure: Zaina Villela;  Surgeon: Irineo White MD;  [facility];  Service: Colorectal       FAMILY HISTORY:  Family History   Problem Relation Age of Onset    Diabetes Maternal Grandmother     Diabetes Paternal Grandfather     Colon cancer Neg Hx        SOCIAL HISTORY:  Social History     Tobacco Use    Smoking status: Former Smoker     Packs/day: 0 00     Years: 0 00     Pack years: 0 00    Smokeless tobacco: Current User     Types: Chew    Tobacco comment: Quit 18 months ago   Vaping Use    Vaping Use: Never used   Substance Use Topics    Alcohol use: Yes     Comment: occasional alcohol use    Drug use: No       MEDICATIONS:    Current Outpatient Medications:     albuterol (PROVENTIL HFA,VENTOLIN HFA) 90 mcg/act inhaler, Inhale 2 puffs every 6 (six) hours as needed for wheezing or shortness of breath, Disp: 1 Inhaler, Rfl: 0    budesonide-formoterol (SYMBICORT) 80-4 5 MCG/ACT inhaler, Inhale 2 puffs 2 (two) times a day Rinse mouth after use  (Patient taking differently: Inhale 2 puffs as needed Rinse mouth after use ), Disp: 1 Inhaler, Rfl: 2    Cholecalciferol (VITAMIN D) 2000 units CAPS, Take 1 capsule by mouth daily, Disp: , Rfl:     Hydrocortisone Butyrate 0 1 % CREA, as needed , Disp: , Rfl: 3    losartan (COZAAR) 50 mg tablet, Take 1 tablet (50 mg total) by mouth daily, Disp: 90 tablet, Rfl: 1    metFORMIN (GLUCOPHAGE) 500 mg tablet, Take 1 tablet (500 mg total) by mouth daily with breakfast, Disp: 90 tablet, Rfl: 1    rosuvastatin (CRESTOR) 5 mg tablet, Take 1 tablet (5 mg total) by mouth daily, Disp: 90 tablet, Rfl: 1    Ustekinumab (STELARA SC), Inject under the skin every 3 (three) months , Disp: , Rfl:     ALLERGIES:  No Known Allergies    REVIEW OF SYSTEMS:  Review of Systems   Constitutional: Negative for appetite change and unexpected weight change  HENT: Negative for congestion and trouble swallowing  Eyes: Negative for visual disturbance  Respiratory: Negative for cough and shortness of breath  Cardiovascular: Negative for chest pain and palpitations  Gastrointestinal: Negative for nausea and vomiting  Endocrine: Negative for cold intolerance and heat intolerance  Musculoskeletal: Negative for gait problem and myalgias  Skin: Negative for rash  Neurological: Negative for numbness  VITALS:  Vitals:    07/08/21 0936   BP: 149/89   Pulse: 64       LABS:  HgA1c:   Lab Results   Component Value Date    HGBA1C 6 0 (H) 03/24/2021     BMP:   Lab Results   Component Value Date    CALCIUM 9 8 03/24/2021    K 4 3 03/24/2021    CO2 29 03/24/2021     03/24/2021    BUN 13 03/24/2021    CREATININE 0 87 03/24/2021       _____________________________________________________  PHYSICAL EXAMINATION:  General: well developed and well nourished, alert, oriented times 3 and appears comfortable  Psychiatric: Normal  HEENT: Trachea Midline, No torticollis  Pulmonary: No audible wheezing or strider  Cardiovascular: No discernable arrhythmia   Skin: No masses, erythema, lacerations, fluctation, ulcerations  Neurovascular: Sensation Intact to the Median, Ulnar, Radial Nerve, Motor Intact to the Median, Ulnar, Radial Nerve and Pulses Intact    MUSCULOSKELETAL EXAMINATION:  Right Elbow:    No swelling or deformity  Full range of motion with flexion, extension, supination and pronation  Positive tenderness to palpation over the Lateral Epicondyle  Pain with passive flexion of the wrist with elbow fully extended  Pain with resisted wrist extension with elbow fully extended     ___________________________________________________  STUDIES REVIEWED:  No studies reviewed  PROCEDURES PERFORMED:  Hand/upper extremity injection: R elbow  Universal Protocol:  Consent: Verbal consent obtained  Risks and benefits: risks, benefits and alternatives were discussed  Consent given by: patient  Time out: Immediately prior to procedure a "time out" was called to verify the correct patient, procedure, equipment, support staff and site/side marked as required    Patient understanding: patient states understanding of the procedure being performed  Site marked: the operative site was marked  Patient identity confirmed: verbally with patient    Supporting Documentation  Indications: pain   Procedure Details  Condition:lateral epicondylitis Site: R elbow   Preparation: Patient was prepped and draped in the usual sterile fashion  Needle size: 25 G  Medications administered: 1 mL lidocaine 1 %; 40 mg triamcinolone acetonide 40 mg/mL    Patient tolerance: patient tolerated the procedure well with no immediate complications  Dressing:  Sterile dressing applied         _____________________________________________________  ASSESSMENT/PLAN:    Bilateral lateral epicondylitis, right worse than left  Patient was provided with a steroid injection for right lateral epicondylitis in the office today  Patient tolerated this procedure well with no immediate complications  · Post-injection instructions discussed with the patient  They should rest, apply ice, or take Tylenol/NSAIDs as needed for post-injection soreness  · We discussed lateral epicondylitis release today  Patient would like to hold off on surgery until winter when it is more convenient for him with work  · Continue home exercises and apply heat prior  · He may take OTC Tylenol/NSAIDs as needed for pain  · Contact the office with any further questions or concerns  · Follow-up as needed or when he is ready to schedule surgery  Follow Up:  Return if symptoms worsen or fail to improve      To Do Next Visit:  Re-evaluation of current issue    Scribe Attestation    I,:  Verenice Barlow am acting as a scribe while in the presence of the attending physician :       I,:  Andriy Manning MD personally performed the services described in this documentation    as scribed in my presence :

## 2021-08-13 LAB
ALBUMIN SERPL-MCNC: 4.5 G/DL (ref 3.6–5.1)
ALBUMIN/GLOB SERPL: 1.8 (CALC) (ref 1–2.5)
ALP SERPL-CCNC: 83 U/L (ref 36–130)
ALT SERPL-CCNC: 45 U/L (ref 9–46)
AST SERPL-CCNC: 26 U/L (ref 10–40)
BILIRUB SERPL-MCNC: 0.3 MG/DL (ref 0.2–1.2)
BUN SERPL-MCNC: 21 MG/DL (ref 7–25)
BUN/CREAT SERPL: ABNORMAL (CALC) (ref 6–22)
CALCIUM SERPL-MCNC: 9.8 MG/DL (ref 8.6–10.3)
CHLORIDE SERPL-SCNC: 105 MMOL/L (ref 98–110)
CHOLEST SERPL-MCNC: 151 MG/DL
CHOLEST/HDLC SERPL: 3.7 (CALC)
CO2 SERPL-SCNC: 29 MMOL/L (ref 20–32)
CREAT SERPL-MCNC: 0.96 MG/DL (ref 0.6–1.35)
GLOBULIN SER CALC-MCNC: 2.5 G/DL (CALC) (ref 1.9–3.7)
GLUCOSE SERPL-MCNC: 107 MG/DL (ref 65–99)
HBA1C MFR BLD: 5.7 % OF TOTAL HGB
HDLC SERPL-MCNC: 41 MG/DL
LDLC SERPL CALC-MCNC: 91 MG/DL (CALC)
NONHDLC SERPL-MCNC: 110 MG/DL (CALC)
POTASSIUM SERPL-SCNC: 4.7 MMOL/L (ref 3.5–5.3)
PROT SERPL-MCNC: 7 G/DL (ref 6.1–8.1)
PSA SERPL-MCNC: 0.6 NG/ML
SL AMB EGFR AFRICAN AMERICAN: 111 ML/MIN/1.73M2
SL AMB EGFR NON AFRICAN AMERICAN: 96 ML/MIN/1.73M2
SODIUM SERPL-SCNC: 141 MMOL/L (ref 135–146)
TRIGL SERPL-MCNC: 93 MG/DL

## 2021-08-13 PROCEDURE — 3044F HG A1C LEVEL LT 7.0%: CPT | Performed by: FAMILY MEDICINE

## 2021-08-23 ENCOUNTER — OFFICE VISIT (OUTPATIENT)
Dept: FAMILY MEDICINE CLINIC | Facility: CLINIC | Age: 46
End: 2021-08-23
Payer: COMMERCIAL

## 2021-08-23 VITALS
HEART RATE: 70 BPM | BODY MASS INDEX: 35.03 KG/M2 | RESPIRATION RATE: 16 BRPM | OXYGEN SATURATION: 99 % | HEIGHT: 66 IN | DIASTOLIC BLOOD PRESSURE: 80 MMHG | WEIGHT: 218 LBS | SYSTOLIC BLOOD PRESSURE: 130 MMHG

## 2021-08-23 DIAGNOSIS — I10 ESSENTIAL HYPERTENSION: ICD-10-CM

## 2021-08-23 DIAGNOSIS — E11.9 CONTROLLED TYPE 2 DIABETES MELLITUS WITHOUT COMPLICATION, WITHOUT LONG-TERM CURRENT USE OF INSULIN (HCC): ICD-10-CM

## 2021-08-23 DIAGNOSIS — J45.40 MODERATE PERSISTENT ASTHMA WITHOUT COMPLICATION: Primary | ICD-10-CM

## 2021-08-23 DIAGNOSIS — E11.9 NEW ONSET TYPE 2 DIABETES MELLITUS (HCC): ICD-10-CM

## 2021-08-23 DIAGNOSIS — E78.5 HYPERLIPIDEMIA, UNSPECIFIED HYPERLIPIDEMIA TYPE: ICD-10-CM

## 2021-08-23 DIAGNOSIS — L40.9 PSORIASIS: ICD-10-CM

## 2021-08-23 PROCEDURE — 3079F DIAST BP 80-89 MM HG: CPT | Performed by: FAMILY MEDICINE

## 2021-08-23 PROCEDURE — 99214 OFFICE O/P EST MOD 30 MIN: CPT | Performed by: FAMILY MEDICINE

## 2021-08-23 PROCEDURE — 3075F SYST BP GE 130 - 139MM HG: CPT | Performed by: FAMILY MEDICINE

## 2021-08-23 RX ORDER — LOSARTAN POTASSIUM 50 MG/1
50 TABLET ORAL DAILY
Qty: 90 TABLET | Refills: 1 | Status: SHIPPED | OUTPATIENT
Start: 2021-08-23 | End: 2022-01-20 | Stop reason: SDUPTHER

## 2021-08-23 RX ORDER — ROSUVASTATIN CALCIUM 5 MG/1
5 TABLET, COATED ORAL DAILY
Qty: 90 TABLET | Refills: 1 | Status: SHIPPED | OUTPATIENT
Start: 2021-08-23 | End: 2022-01-20 | Stop reason: SDUPTHER

## 2021-08-23 NOTE — PROGRESS NOTES
Subjective:      Patient ID: Kirstie Riley is a 42 y o  male  Diabetes  He presents for his follow-up diabetic visit  He has type 2 diabetes mellitus  His disease course has been improving (A1c 5 7)  There are no hypoglycemic associated symptoms  Pertinent negatives for hypoglycemia include no headaches  Pertinent negatives for diabetes include no chest pain, no polydipsia and no polyuria  There are no hypoglycemic complications  Symptoms are improving  There are no diabetic complications  Risk factors for coronary artery disease include diabetes mellitus, dyslipidemia, hypertension and male sex  Current diabetic treatments: Metformin 500 milligram daily  He is compliant with treatment all of the time  He participates in exercise daily  An ACE inhibitor/angiotensin II receptor blocker is being taken  Eye exam is current  Hypertension  This is a chronic problem  The current episode started more than 1 year ago  The problem is controlled  Pertinent negatives include no chest pain, headaches, palpitations or shortness of breath  Risk factors for coronary artery disease include diabetes mellitus, dyslipidemia and male gender  Treatments tried: Losartan 50 milligram  The current treatment provides significant improvement  There are no compliance problems  Hyperlipidemia  This is a chronic problem  The current episode started more than 1 year ago  The problem is controlled  Recent lipid tests were reviewed and are normal  Pertinent negatives include no chest pain, myalgias or shortness of breath  Current antihyperlipidemic treatment includes diet change, exercise and statins (Crestor 5 milligram)  The current treatment provides significant improvement of lipids  There are no compliance problems  Risk factors for coronary artery disease include diabetes mellitus, dyslipidemia, hypertension and male sex         Past Medical History:   Diagnosis Date    Diverticulitis     Hyperlipemia     Hypertension     Prediabetes     Psoriasis        Family History   Problem Relation Age of Onset    Diabetes Maternal Grandmother     Diabetes Paternal Grandfather     Colon cancer Neg Hx        Past Surgical History:   Procedure Laterality Date    COLONOSCOPY      [place] COLONOSCOPY FLX DX W/COLLJ SPEC WHEN PFRMD N/A 4/3/2019    Procedure: COLONOSCOPY;  Surgeon: Duane Rivera MD;  [facility]; Service: Colorectal    [place] LAP,SURG,COLECTOMY, PARTIAL, W/ANAST N/A 1/8/2020    Procedure: RESECTION COLON SIGMOID LAPAROSCOPIC, LAPAROSCOPIC SPLENIC FLEXURE MOBILIZATION;  Surgeon: Duane Rivera MD;  [facility];  Service: Colorectal    [place] SIGMOIDOSCOPY FLX DX W/COLLJ SPEC BR/WA IF PFRMD N/A 1/8/2020    Procedure: SIGMOIDOSCOPY FLEXIBLE;  Surgeon: Duane Rivera MD;  [facility];  Service: Colorectal        reports that he has quit smoking  He has a 60 00 pack-year smoking history  His smokeless tobacco use includes chew  He reports current alcohol use  He reports that he does not use drugs  Current Outpatient Medications:     albuterol (PROVENTIL HFA,VENTOLIN HFA) 90 mcg/act inhaler, Inhale 2 puffs every 6 (six) hours as needed for wheezing or shortness of breath, Disp: 1 Inhaler, Rfl: 0    budesonide-formoterol (SYMBICORT) 80-4 5 MCG/ACT inhaler, Inhale 2 puffs 2 (two) times a day Rinse mouth after use   (Patient taking differently: Inhale 2 puffs as needed Rinse mouth after use ), Disp: 1 Inhaler, Rfl: 2    Cholecalciferol (VITAMIN D) 2000 units CAPS, Take 1 capsule by mouth daily, Disp: , Rfl:     Hydrocortisone Butyrate 0 1 % CREA, as needed , Disp: , Rfl: 3    losartan (COZAAR) 50 mg tablet, Take 1 tablet (50 mg total) by mouth daily, Disp: 90 tablet, Rfl: 1    metFORMIN (GLUCOPHAGE) 500 mg tablet, Take 1 tablet (500 mg total) by mouth daily with breakfast, Disp: 90 tablet, Rfl: 1    rosuvastatin (CRESTOR) 5 mg tablet, Take 1 tablet (5 mg total) by mouth daily, Disp: 90 tablet, Rfl: 1    Ustekinumab (STELARA SC), Inject under the skin every 3 (three) months , Disp: , Rfl:     The following portions of the patient's history were reviewed and updated as appropriate: allergies, current medications, past family history, past medical history, past social history, past surgical history and problem list     Review of Systems   Constitutional: Negative  Respiratory: Negative  Negative for shortness of breath  Cardiovascular: Negative  Negative for chest pain and palpitations  Gastrointestinal: Negative  Endocrine: Negative  Negative for polydipsia and polyuria  Genitourinary: Negative  Musculoskeletal: Negative  Negative for myalgias  Allergic/Immunologic: Negative  Neurological: Negative  Negative for headaches  Psychiatric/Behavioral: Negative  Objective:    /80   Pulse 70   Resp 16   Ht 5' 6" (1 676 m)   Wt 98 9 kg (218 lb)   SpO2 99%   BMI 35 19 kg/m²      Physical Exam  Constitutional:       Appearance: He is well-developed  HENT:      Head: Normocephalic  Right Ear: External ear normal       Left Ear: External ear normal    Eyes:      Pupils: Pupils are equal, round, and reactive to light  Cardiovascular:      Rate and Rhythm: Normal rate and regular rhythm  Pulmonary:      Effort: Pulmonary effort is normal       Breath sounds: Normal breath sounds  Abdominal:      General: Bowel sounds are normal       Palpations: Abdomen is soft  Musculoskeletal:         General: Normal range of motion  Cervical back: Normal range of motion and neck supple  Neurological:      Mental Status: He is alert and oriented to person, place, and time     Psychiatric:         Behavior: Behavior normal          Judgment: Judgment normal            Recent Results (from the past 1008 hour(s))   Lipid panel    Collection Time: 08/13/21  6:51 AM   Result Value Ref Range    Total Cholesterol 151 <200 mg/dL    HDL 41 > OR = 40 mg/dL Triglycerides 93 <150 mg/dL    LDL Calculated 91 mg/dL (calc)    Chol HDLC Ratio 3 7 <5 0 (calc)    Non-HDL Cholesterol 110 <130 mg/dL (calc)   Comprehensive metabolic panel    Collection Time: 08/13/21  6:51 AM   Result Value Ref Range    Glucose, Random 107 (H) 65 - 99 mg/dL    BUN 21 7 - 25 mg/dL    Creatinine 0 96 0 60 - 1 35 mg/dL    eGFR Non  96 > OR = 60 mL/min/1 73m2    eGFR  111 > OR = 60 mL/min/1 73m2    SL AMB BUN/CREATININE RATIO NOT APPLICABLE 6 - 22 (calc)    Sodium 141 135 - 146 mmol/L    Potassium 4 7 3 5 - 5 3 mmol/L    Chloride 105 98 - 110 mmol/L    CO2 29 20 - 32 mmol/L    Calcium 9 8 8 6 - 10 3 mg/dL    Protein, Total 7 0 6 1 - 8 1 g/dL    Albumin 4 5 3 6 - 5 1 g/dL    Globulin 2 5 1 9 - 3 7 g/dL (calc)    Albumin/Globulin Ratio 1 8 1 0 - 2 5 (calc)    TOTAL BILIRUBIN 0 3 0 2 - 1 2 mg/dL    Alkaline Phosphatase 83 36 - 130 U/L    AST 26 10 - 40 U/L    ALT 45 9 - 46 U/L   PSA, Total Screen    Collection Time: 08/13/21  6:51 AM   Result Value Ref Range    Prostate Specific Antigen Total 0 6 < OR = 4 0 ng/mL   Hemoglobin A1c (w/out EAG)    Collection Time: 08/13/21  6:51 AM   Result Value Ref Range    Hemoglobin A1C 5 7 (H) <5 7 % of total Hgb       Assessment/Plan:             Problem List Items Addressed This Visit        Endocrine    New onset type 2 diabetes mellitus ([facility] [address] )       Lab Results   Component Value Date    HGBA1C 5 7 (H) 08/13/2021   A1c improved significantly with lifestyle modifications  Patient is currently on metformin 500 milligram daily  Has not experienced any symptoms of hypoglycemia  Would like to continue with low-dose metformin, blood sugar monitoring    Recommended metabolic labs/follow-up at  6 month interval   Treatment of hypoglycemia discussed with patient         Relevant Medications    metFORMIN (GLUCOPHAGE) 500 mg tablet       Respiratory    Moderate persistent asthma without complication - Primary     Symptoms have improved significantly since patient quit smoking  Patient is not using Symbicort or albuterol  Denies any symptoms of productive cough or shortness of breath  Patient still has inhalers at home, will call the office if he needs refills     Recommended follow-up if symptoms change              Cardiovascular and Mediastinum    Essential hypertension     Patient's blood pressure is at goal   Continue losartan 50 milligram          Relevant Medications    losartan (COZAAR) 50 mg tablet       Musculoskeletal and Integument    Psoriasis     Continues Stelara            Other    Hyperlipidemia     LDL at goal  Continue Crestor 5 milligram          Relevant Medications    rosuvastatin (CRESTOR) 5 mg tablet    Other Relevant Orders    Lipid panel      Other Visit Diagnoses     Controlled type 2 diabetes mellitus without complication, without long-term current use of insulin (HCC)        Relevant Medications    metFORMIN (GLUCOPHAGE) 500 mg tablet    Other Relevant Orders    Comprehensive metabolic panel    Hemoglobin A1C    Microalbumin / creatinine urine ratio

## 2021-08-23 NOTE — ASSESSMENT & PLAN NOTE
Lab Results   Component Value Date    HGBA1C 5 7 (H) 08/13/2021   A1c improved significantly with lifestyle modifications  Patient is currently on metformin 500 milligram daily  Has not experienced any symptoms of hypoglycemia  Would like to continue with low-dose metformin, blood sugar monitoring    Recommended metabolic labs/follow-up at  6 month interval   Treatment of hypoglycemia discussed with patient

## 2021-08-23 NOTE — ASSESSMENT & PLAN NOTE
Symptoms have improved significantly since patient quit smoking  Patient is not using Symbicort or albuterol  Denies any symptoms of productive cough or shortness of breath  Patient still has inhalers at home, will call the office if he needs refills     Recommended follow-up if symptoms change

## 2021-10-14 ENCOUNTER — OFFICE VISIT (OUTPATIENT)
Dept: FAMILY MEDICINE CLINIC | Facility: CLINIC | Age: 46
End: 2021-10-14
Payer: COMMERCIAL

## 2021-10-14 VITALS
SYSTOLIC BLOOD PRESSURE: 138 MMHG | OXYGEN SATURATION: 98 % | BODY MASS INDEX: 34.97 KG/M2 | WEIGHT: 217.6 LBS | HEART RATE: 100 BPM | DIASTOLIC BLOOD PRESSURE: 88 MMHG | RESPIRATION RATE: 18 BRPM | HEIGHT: 66 IN

## 2021-10-14 DIAGNOSIS — M77.11 LATERAL EPICONDYLITIS OF RIGHT ELBOW: ICD-10-CM

## 2021-10-14 DIAGNOSIS — M54.10 RADICULOPATHY, UNSPECIFIED SPINAL REGION: ICD-10-CM

## 2021-10-14 DIAGNOSIS — M54.2 NECK PAIN: Primary | ICD-10-CM

## 2021-10-14 PROCEDURE — 3079F DIAST BP 80-89 MM HG: CPT | Performed by: FAMILY MEDICINE

## 2021-10-14 PROCEDURE — 99214 OFFICE O/P EST MOD 30 MIN: CPT | Performed by: FAMILY MEDICINE

## 2021-10-14 PROCEDURE — 3075F SYST BP GE 130 - 139MM HG: CPT | Performed by: FAMILY MEDICINE

## 2021-10-14 RX ORDER — METHYLPREDNISOLONE 4 MG/1
TABLET ORAL
Qty: 21 EACH | Refills: 0 | Status: SHIPPED | OUTPATIENT
Start: 2021-10-14 | End: 2022-01-04 | Stop reason: SDUPTHER

## 2021-10-27 ENCOUNTER — TELEPHONE (OUTPATIENT)
Dept: FAMILY MEDICINE CLINIC | Facility: CLINIC | Age: 46
End: 2021-10-27

## 2021-11-24 ENCOUNTER — CONSULT (OUTPATIENT)
Dept: PAIN MEDICINE | Facility: CLINIC | Age: 46
End: 2021-11-24
Payer: COMMERCIAL

## 2021-11-24 VITALS
SYSTOLIC BLOOD PRESSURE: 143 MMHG | HEART RATE: 84 BPM | WEIGHT: 205 LBS | HEIGHT: 66 IN | DIASTOLIC BLOOD PRESSURE: 83 MMHG | RESPIRATION RATE: 18 BRPM | BODY MASS INDEX: 32.95 KG/M2

## 2021-11-24 DIAGNOSIS — M54.12 CERVICAL RADICULITIS: Primary | ICD-10-CM

## 2021-11-24 DIAGNOSIS — M54.2 NECK PAIN: ICD-10-CM

## 2021-11-24 PROCEDURE — 3077F SYST BP >= 140 MM HG: CPT | Performed by: ANESTHESIOLOGY

## 2021-11-24 PROCEDURE — 99244 OFF/OP CNSLTJ NEW/EST MOD 40: CPT | Performed by: ANESTHESIOLOGY

## 2021-11-24 PROCEDURE — 3079F DIAST BP 80-89 MM HG: CPT | Performed by: ANESTHESIOLOGY

## 2021-11-30 ENCOUNTER — EVALUATION (OUTPATIENT)
Dept: PHYSICAL THERAPY | Facility: MEDICAL CENTER | Age: 46
End: 2021-11-30
Payer: COMMERCIAL

## 2021-11-30 DIAGNOSIS — M54.2 NECK PAIN: ICD-10-CM

## 2021-11-30 DIAGNOSIS — M54.12 CERVICAL RADICULITIS: Primary | ICD-10-CM

## 2021-11-30 PROCEDURE — 97162 PT EVAL MOD COMPLEX 30 MIN: CPT

## 2021-12-02 ENCOUNTER — TELEPHONE (OUTPATIENT)
Dept: FAMILY MEDICINE CLINIC | Facility: CLINIC | Age: 46
End: 2021-12-02

## 2021-12-02 ENCOUNTER — OFFICE VISIT (OUTPATIENT)
Dept: PHYSICAL THERAPY | Facility: MEDICAL CENTER | Age: 46
End: 2021-12-02
Payer: COMMERCIAL

## 2021-12-02 DIAGNOSIS — M54.2 NECK PAIN: Primary | ICD-10-CM

## 2021-12-02 DIAGNOSIS — M54.12 CERVICAL RADICULITIS: ICD-10-CM

## 2021-12-02 PROCEDURE — 97530 THERAPEUTIC ACTIVITIES: CPT

## 2021-12-03 ENCOUNTER — HOSPITAL ENCOUNTER (EMERGENCY)
Facility: HOSPITAL | Age: 46
Discharge: HOME | End: 2021-12-03
Attending: EMERGENCY MEDICINE
Payer: COMMERCIAL

## 2021-12-03 ENCOUNTER — TELEPHONE (OUTPATIENT)
Dept: PAIN MEDICINE | Facility: CLINIC | Age: 46
End: 2021-12-03

## 2021-12-03 VITALS
DIASTOLIC BLOOD PRESSURE: 67 MMHG | WEIGHT: 200 LBS | RESPIRATION RATE: 16 BRPM | SYSTOLIC BLOOD PRESSURE: 144 MMHG | BODY MASS INDEX: 32.28 KG/M2 | OXYGEN SATURATION: 98 % | HEART RATE: 86 BPM | TEMPERATURE: 98.5 F

## 2021-12-03 DIAGNOSIS — R20.2 PARESTHESIAS: Primary | ICD-10-CM

## 2021-12-03 DIAGNOSIS — M50.120 CERVICAL DISC DISORDER WITH RADICULOPATHY OF MID-CERVICAL REGION: Primary | ICD-10-CM

## 2021-12-03 PROCEDURE — 99282 EMERGENCY DEPT VISIT SF MDM: CPT | Performed by: EMERGENCY MEDICINE

## 2021-12-03 PROCEDURE — 99283 EMERGENCY DEPT VISIT LOW MDM: CPT

## 2021-12-07 ENCOUNTER — TELEPHONE (OUTPATIENT)
Dept: RADIOLOGY | Facility: CLINIC | Age: 46
End: 2021-12-07

## 2021-12-07 ENCOUNTER — APPOINTMENT (OUTPATIENT)
Dept: PHYSICAL THERAPY | Facility: MEDICAL CENTER | Age: 46
End: 2021-12-07
Payer: COMMERCIAL

## 2021-12-21 ENCOUNTER — PROCEDURE VISIT (OUTPATIENT)
Dept: NEUROLOGY | Facility: CLINIC | Age: 46
End: 2021-12-21
Payer: COMMERCIAL

## 2021-12-21 DIAGNOSIS — M54.10 RADICULOPATHY, UNSPECIFIED SPINAL REGION: ICD-10-CM

## 2021-12-21 DIAGNOSIS — M54.2 NECK PAIN: ICD-10-CM

## 2021-12-21 DIAGNOSIS — M54.12 CERVICAL RADICULITIS: ICD-10-CM

## 2021-12-21 PROCEDURE — 95911 NRV CNDJ TEST 9-10 STUDIES: CPT | Performed by: PHYSICAL MEDICINE & REHABILITATION

## 2021-12-21 PROCEDURE — 95886 MUSC TEST DONE W/N TEST COMP: CPT | Performed by: PHYSICAL MEDICINE & REHABILITATION

## 2021-12-22 ENCOUNTER — HOSPITAL ENCOUNTER (OUTPATIENT)
Dept: MRI IMAGING | Facility: HOSPITAL | Age: 46
Discharge: HOME | End: 2021-12-22
Payer: COMMERCIAL

## 2021-12-22 DIAGNOSIS — M50.120 CERVICAL DISC DISORDER WITH RADICULOPATHY OF MID-CERVICAL REGION: ICD-10-CM

## 2021-12-22 PROCEDURE — 72141 MRI NECK SPINE W/O DYE: CPT

## 2021-12-27 ENCOUNTER — TELEPHONE (OUTPATIENT)
Dept: PAIN MEDICINE | Facility: CLINIC | Age: 46
End: 2021-12-27

## 2022-01-04 ENCOUNTER — OFFICE VISIT (OUTPATIENT)
Dept: PAIN MEDICINE | Facility: CLINIC | Age: 47
End: 2022-01-04
Payer: COMMERCIAL

## 2022-01-04 VITALS
HEART RATE: 69 BPM | RESPIRATION RATE: 14 BRPM | HEIGHT: 66 IN | SYSTOLIC BLOOD PRESSURE: 148 MMHG | BODY MASS INDEX: 32.78 KG/M2 | WEIGHT: 204 LBS | DIASTOLIC BLOOD PRESSURE: 88 MMHG

## 2022-01-04 DIAGNOSIS — M54.2 CERVICALGIA: ICD-10-CM

## 2022-01-04 DIAGNOSIS — G89.4 CHRONIC PAIN SYNDROME: Primary | ICD-10-CM

## 2022-01-04 DIAGNOSIS — M50.90 CERVICAL DISC DISEASE: ICD-10-CM

## 2022-01-04 DIAGNOSIS — M54.10 RADICULOPATHY, UNSPECIFIED SPINAL REGION: ICD-10-CM

## 2022-01-04 PROBLEM — M50.120 CERVICAL DISC DISORDER WITH RADICULOPATHY OF MID-CERVICAL REGION: Status: ACTIVE | Noted: 2022-01-04

## 2022-01-04 PROCEDURE — 99214 OFFICE O/P EST MOD 30 MIN: CPT | Performed by: NURSE PRACTITIONER

## 2022-01-04 RX ORDER — PREGABALIN 75 MG/1
75 CAPSULE ORAL 2 TIMES DAILY
Qty: 60 CAPSULE | Refills: 1 | Status: SHIPPED | OUTPATIENT
Start: 2022-01-04 | End: 2022-04-05

## 2022-01-04 RX ORDER — METHYLPREDNISOLONE 4 MG/1
TABLET ORAL
Qty: 21 EACH | Refills: 0 | Status: SHIPPED | OUTPATIENT
Start: 2022-01-04

## 2022-01-04 NOTE — PATIENT INSTRUCTIONS
Methylprednisolone (By mouth)   Methylprednisolone (meth-il-pred-NIS-oh-lone)  Treats inflammation, severe allergies, flare-ups of ongoing illnesses, and many other medical problems  May also be used to decrease some symptoms of cancer  This medicine is a corticosteroid  Brand Name(s): Medrol, Medrol Dosepak, Methylpred-DP   There may be other brand names for this medicine  When This Medicine Should Not Be Used: This medicine is not right for everyone  Do not use it if you had an allergic reaction to methylprednisolone, or if you have a fungus infection  How to Use This Medicine:   Tablet  · Take your medicine as directed  Your dose may need to be changed several times to find what works best for you  · If you are using this medicine for an ongoing illness, your dose may need to be changed occasionally  Some people take this medicine only every other day, which helps to decrease side effects  · Take your medicine in the morning, unless your doctor tells you otherwise  · It is best to take this medicine with food or milk  · Missed dose: Take a dose as soon as you remember  If it is almost time for your next dose, wait until then and take a regular dose  Do not take extra medicine to make up for a missed dose  · Store the medicine in a closed container at room temperature, away from heat, moisture, and direct light  Drugs and Foods to Avoid:   Ask your doctor or pharmacist before using any other medicine, including over-the-counter medicines, vitamins, and herbal products  · Some medicines can affect how methylprednisolone works  Tell your doctor if you are using any of the following:  ? Cyclosporine, ketoconazole, phenobarbital, phenytoin, rifampin, troleandomycin  ? Aspirin, especially in high doses  ? Blood thinner (including warfarin)  ? Diabetes medicine  · This medicine may interfere with vaccines  Ask your doctor before you get a flu shot or any other vaccines    Warnings While Using This Medicine: · Tell your doctor if you are pregnant or breastfeeding, or if you have kidney disease, liver disease (including cirrhosis), adrenal gland problems, heart failure, high blood pressure, diabetes, osteoporosis, blood clotting problems, thyroid problems, mental health problems (including depression), myasthenia gravis, or stomach or bowel problems (including ulcer or diverticulitis)  Tell your doctor if you have an infection (including herpes eye infection, tuberculosis, or threadworm)  Also tell your doctor if you have a recent exposure to chickenpox or measles  · This medicine may cause the following problems:  ? Increased risk of infection  ? Changes in mood or behavior  ? High blood pressure  ? Adrenal gland problems  ? Eye problems or changes in vision (including cataracts or glaucoma)  ? Bone problems (including osteoporosis)  ? Slow growth in children  ? Increased risk for cancer (including Kaposi's sarcoma)  · If you use this medicine for a long time, tell your doctor about any extra stress or anxiety in your life, including other health concerns and emotional stress  Your dose might need to be changed for a short time while you have extra stress  · Do not stop using this medicine suddenly  Your doctor will need to slowly decrease your dose before you stop it completely  · Tell any doctor or dentist who treats you that you are using this medicine  This medicine may affect certain medical test results  · Your doctor will do lab tests at regular visits to check on the effects of this medicine  Keep all appointments  · Keep all medicine out of the reach of children  Never share your medicine with anyone    Possible Side Effects While Using This Medicine:   Call your doctor right away if you notice any of these side effects:  · Allergic reaction: Itching or hives, swelling in your face or hands, swelling or tingling in your mouth or throat, chest tightness, trouble breathing  · Bone pain, decrease in height  · Dark freckles, skin color changes, coldness, weakness, tiredness, weight loss  · Depression, unusual thoughts, feelings, or behaviors, trouble sleeping  · Fever, chills, cough, sore throat, body aches  · Severe stomach pain, nausea, vomiting, or red or black stools  · Skin changes or growths  · Swelling in your hands, ankles, or feet, rapid weight gain  · Trouble seeing, blurred vision or other changes in vision, eye pain, headache  · Unusual bleeding or bruising  If you notice these less serious side effects, talk with your doctor:   · Increased appetite  · Round, puffy face  · Weight gain around your neck, upper back, breast, face, or waist  If you notice other side effects that you think are caused by this medicine, tell your doctor  Call your doctor for medical advice about side effects  You may report side effects to FDA at 1-695-FDA-3580    © Copyright [employer] 2021 Information is for End User's use only and may not be sold, redistributed or otherwise used for commercial purposes  The above information is an  only  It is not intended as medical advice for individual conditions or treatments  Talk to your doctor, nurse or pharmacist before following any medical regimen to see if it is safe and effective for you  Pregabalin (By mouth)   Pregabalin (pre-GA-ba-carlos)  Treats nerve and muscle pain, including fibromyalgia  Also treats partial-onset seizures  Brand Name(s): Lyrica, Lyrica CR   There may be other brand names for this medicine  When This Medicine Should Not Be Used: This medicine is not right for everyone  Do not use it if you had an allergic reaction to pregabalin  How to Use This Medicine:   Capsule, Liquid, Long Acting Tablet  · Take your medicine as directed  Your dose may need to be changed several times to find what works best for you  · Extended-release tablet: Swallow the extended-release tablet whole  Do not crush, break, or chew it   Take it after an evening meal   · Oral liquid: Measure the oral liquid medicine with a marked measuring spoon, oral syringe, or medicine cup  · This medicine should come with a Medication Guide  Ask your pharmacist for a copy if you do not have one  · Missed dose: Take a dose as soon as you remember  If it is almost time for your next dose, wait until then and take a regular dose  Do not take extra medicine to make up for a missed dose  If you miss a dose of the extended-release tablet after your evening meal, take it before bedtime after a snack  If you miss the dose before bedtime, take it after your morning meal  If you do not take the dose the following morning, then take the next dose at your regular time after your evening meal  Do not take 2 doses at the same time  · Store the medicine in a closed container at room temperature, away from heat, moisture, and direct light  Drugs and Foods to Avoid:   Ask your doctor or pharmacist before using any other medicine, including over-the-counter medicines, vitamins, and herbal products  · Some medicines can affect how pregabalin works  Tell your doctor if you are using any of the following:   ? ACE inhibitor (including benazepril, enalapril, lisinopril, quinapril, ramipril)  ? Oral diabetes medicine (including metformin, pioglitazone, rosiglitazone)  · Do not drink alcohol while you are using this medicine  · Tell your doctor if you use anything else that makes you sleepy  Some examples are allergy medicine, narcotic pain medicine, and alcohol  Tell your doctor if you are also using oxycodone, lorazepam, or zolpidem  Warnings While Using This Medicine:   · Tell your doctor if you are pregnant or breastfeeding, or if you have kidney disease, heart failure, heart rhythm problems, lung or breathing problems, a bleeding disorder, diabetes, sores or skin problems, or a low blood platelet count   Tell your doctor if you have a history of angioedema (severe swelling), alcohol or drug abuse, depression, or other mood problems  · This medicine may cause the following problems:   ? Angioedema (severe swelling), which may be life-threatening  ? Changes in mood or behavior, including suicidal thoughts or behavior  ? Respiratory depression (serious breathing problem that can be life-threatening), when used with narcotic pain medicines  ? Peripheral edema (swelling of your hands, ankles, feet, or lower legs)  ? Increased risk for cancer and bleeding  ? Serious muscle problems  ? Heart rhythm changes  · This medicine may make you dizzy or drowsy  It may also cause blurry or double vision  Do not drive or do anything else that could be dangerous until you know how this medicine affects you  · Do not stop using this medicine suddenly  Your doctor will need to slowly decrease your dose before you stop it completely  · Your doctor will do lab tests at regular visits to check on the effects of this medicine  Keep all appointments  · Keep all medicine out of the reach of children  Never share your medicine with anyone    Possible Side Effects While Using This Medicine:   Call your doctor right away if you notice any of these side effects:  · Allergic reaction: Itching or hives, swelling in your face or hands, swelling or tingling in your mouth or throat, chest tightness, trouble breathing  · Blistering, peeling, red skin rash  · Blue lips, fingernails, or skin, trouble breathing, chest pain  · Blurry or double vision  · Fever, chills, cough, sore throat, body aches  · Muscle pain, tenderness, or weakness, general feeling of illness  · Rapid weight gain, swelling in your hands, ankles, or feet  · Severe dizziness or drowsiness  · Sudden mood changes, unusual moods or behavior, including extreme happiness or depression, thoughts or attempts of killing oneself  · Swelling in your throat, head, or neck  · Uneven heartbeat  · Unusual bleeding, bruising, or weakness  If you notice these less serious side effects, talk with your doctor:   · Confusion, trouble concentrating  · Constipation  · Dry mouth  If you notice other side effects that you think are caused by this medicine, tell your doctor  Call your doctor for medical advice about side effects  You may report side effects to FDA at 3-275-FDA-2409    © Copyright [employer] 2021 Information is for End User's use only and may not be sold, redistributed or otherwise used for commercial purposes  The above information is an  only  It is not intended as medical advice for individual conditions or treatments  Talk to your doctor, nurse or pharmacist before following any medical regimen to see if it is safe and effective for you

## 2022-01-04 NOTE — PROGRESS NOTES
Assessment:  1  Chronic pain syndrome    2  Radiculopathy, unspecified spinal region    3  Cervical disc disease    4  Cervicalgia        Plan:  The patient was seen in the office today for follow-up of his chronic pain secondary to cervical disc disease with radiculopathy  He had an MRI completed on 2021 and was referred to Neurosurgery  He has a telemedicine visit scheduled with them on 2022  At this time, he would like to hold off scheduling any injections until this visit  At this time, we will do the followin  Start Medrol Dosepak  Patient states that it was ordered for him back in October in he never picked it up because he did not know what it was for  It after I explained to him what it was and how would help, he would like to start it  Refills were sent to the pharmacy on file  Side effects were reviewed with the patient and information was provided in the AVS     2  Start Lyrica 75 mg twice a day  He will start 75 mg at bedtime for 7 days and if no side effects, he will increase to 75 mg twice a day  Side effects were reviewed with the patient, information was provided in the AVS and he verbalized understanding  Prescription was sent to the pharmacy on file  3  Patient will call the office to schedule a cervical epidural steroid injection if he decides to move forward with this after his neurosurgery consultation  4  He also states that he is experiencing radiating right leg pain  He states that sometimes his feels like he is off balance  There is no imaging at this time on his low back  He will most likely need to start physical therapy in if it is not successful in helping his pain symptoms, we will obtain an MRI of his lumbar spine  My impressions and treatment recommendations were discussed in detail with the patient who verbalized understanding and had no further questions  Discharge instructions were provided   I personally saw and examined the patient and I agree with the above discussed plan of care  No orders of the defined types were placed in this encounter  New Medications Ordered This Visit   Medications    methylPREDNISolone 4 MG tablet therapy pack     Sig: Use as directed on package     Dispense:  21 each     Refill:  0    pregabalin (LYRICA) 75 mg capsule     Sig: Take 1 capsule (75 mg total) by mouth 2 (two) times a day     Dispense:  60 capsule     Refill:  1       History of Present Illness:  Vanessa Katz is a 42 y o  male who presents for a follow up office visit in regards to Neck Pain  The patients current symptoms include a pain score that is 5/10 and constant to some degree  States that it feels like his hands are constantly numb with pins and needles  He states that when he moves his head to look up he has pain  Patient also reports radiating right leg pain which feels like it makes him off balance sometimes  He also states that his pain symptoms have made him be out of work since December and he is concerned about how he is going to go back to work with the amount of pain he is in  I have personally reviewed and/or updated the patient's past medical history, past surgical history, family history, social history, current medications, allergies, and vital signs today  Review of Systems   Respiratory: Negative for shortness of breath  Cardiovascular: Negative for chest pain  Gastrointestinal: Negative for constipation, diarrhea, nausea and vomiting  Musculoskeletal: Positive for gait problem and neck pain  Negative for arthralgias, joint swelling and myalgias  Skin: Negative for rash  Neurological: Positive for dizziness and weakness  Negative for seizures  All other systems reviewed and are negative        Patient Active Problem List   Diagnosis    Essential hypertension    Lateral epicondylitis of right elbow    Psoriasis    Hyperlipidemia    Moderate persistent asthma without complication    Diverticulitis large intestine w/o perforation or abscess w/o bleeding    Need for prophylactic vaccination and inoculation against influenza    Status post colon resection    New onset type 2 diabetes mellitus ([address] )    Left foot pain    Sleep apnea    Cervicalgia    Radiculopathy    Chronic pain syndrome    Cervical disc disorder with radiculopathy of mid-cervical region       Past Medical History:   Diagnosis Date    Diverticulitis     Hyperlipemia     Hypertension     Prediabetes     Psoriasis        Past Surgical History:   Procedure Laterality Date    COLONOSCOPY      DC COLONOSCOPY FLX DX W/COLLJ SPEC WHEN PFRMD N/A 4/3/2019    Procedure: COLONOSCOPY;  Surgeon: Ryna Lee MD;  [facility];   Service: Colorectal    DC LAP,SURG,COLECTOMY, PARTIAL, W/ANAST N/A 1/8/2020    Procedure: RESECTION COLON SIGMOID LAPAROSCOPIC, LAPAROSCOPIC SPLENIC FLEXURE MOBILIZATION;  Surgeon: Ryan Lee MD;  [facility];  Service: Colorectal    DC SIGMOIDOSCOPY FLX DX W/COLLJ SPEC BR/WA IF PFRMD N/A 1/8/2020    Procedure: Good Reed;  Surgeon: Ryan Lee MD;  [facility];  Service: Colorectal       Family History   Problem Relation Age of Onset    Diabetes Maternal Grandmother     Diabetes Paternal Grandfather     Colon cancer Neg Hx        Social History     Occupational History    Not on file   Tobacco Use    Smoking status: Former Smoker     Packs/day: 2 00     Years: 30 00     Pack years: 60 00    Smokeless tobacco: Current User     Types: Chew    Tobacco comment: Quit 20 months ago   Vaping Use    Vaping Use: Never used   Substance and Sexual Activity    Alcohol use: Yes     Comment: occasional alcohol use    Drug use: No    Sexual activity: Not on file       Current Outpatient Medications on File Prior to Visit   Medication Sig    albuterol (PROVENTIL HFA,VENTOLIN HFA) 90 mcg/act inhaler Inhale 2 puffs every 6 (six) hours as needed for wheezing or shortness of breath    budesonide-formoterol (SYMBICORT) 80-4 5 MCG/ACT inhaler Inhale 2 puffs 2 (two) times a day Rinse mouth after use  (Patient taking differently: Inhale 2 puffs as needed Rinse mouth after use )    Cholecalciferol (VITAMIN D) 2000 units CAPS Take 1 capsule by mouth daily    Hydrocortisone Butyrate 0 1 % CREA as needed     losartan (COZAAR) 50 mg tablet Take 1 tablet (50 mg total) by mouth daily    metFORMIN (GLUCOPHAGE) 500 mg tablet Take 1 tablet (500 mg total) by mouth daily with breakfast    rosuvastatin (CRESTOR) 5 mg tablet Take 1 tablet (5 mg total) by mouth daily    Ustekinumab (STELARA SC) Inject under the skin every 3 (three) months     [DISCONTINUED] methylPREDNISolone 4 MG tablet therapy pack Use as directed on package     No current facility-administered medications on file prior to visit  No Known Allergies    Physical Exam:    /88   Pulse 69   Resp 14   Ht 5' 6" (1 676 m)   Wt 92 5 kg (204 lb)   BMI 32 93 kg/m²     Constitutional:obese  Eyes:anicteric  HEENT:grossly intact  Neck:supple, symmetric, trachea midline and no masses   Pulmonary:even and unlabored  Cardiovascular:No edema or pitting edema present  Skin:Normal without rashes or lesions and well hydrated  Psychiatric:Mood and affect appropriate  Neurologic:Cranial Nerves II-XII grossly intact  Musculoskeletal:antalgic and Pain in stiffness in neck with flexion, extension and rotation    Imaging  Study Result    Narrative & Impression   MRI CERVICAL SPINE WITHOUT CONTRAST     INDICATION: M50 120: Mid-cervical disc disorder, unspecified level     Worsening neck pain, radiating into arms with numbness and weakness     COMPARISON:  X-ray 12/16/2019     TECHNIQUE:  Sagittal T1, sagittal T2, sagittal inversion recovery, axial T2, axial  2D merge     IMAGE QUALITY:  Diagnostic     FINDINGS:     ALIGNMENT:  Straightening of normal cervical lordosis without fracture or subluxation      MARROW SIGNAL:  Normal marrow signal is identified within the visualized bony structures  No discrete marrow lesion      CERVICAL AND VISUALIZED THORACIC CORD:  Moderate degree of congenital canal stenosis which is exacerbated by spondylosis and osteoarthritis, and is most prevalent at the C3-C4 and C5-C6 levels where AP dimension of the canal approaches 5-6 mm  Cord   compression is present at both level with bilateral right greater than left gliosis/edema at C3-C4      PREVERTEBRAL AND PARASPINAL SOFT TISSUES:  Normal      VISUALIZED POSTERIOR FOSSA:  The visualized posterior fossa demonstrates no abnormal signal      CERVICAL DISC SPACES:     C2-C3:  Minor bilateral facet arthrosis      C3-C4:  Diffuse posterior disc bulge, asymmetric to the left  AP dimension of the canal reduced to less than 5 mm  Osteophytes extend posteriorly bilaterally, asymmetric to the left resulting in left greater than right foraminal stenosis, correlate for   bilateral C4 radiculitis      C4-C5:  Mild right facet arthrosis, minor canal stenosis      C5-C6:  Central disc protrusion with central flattening of the cord  No definite edema  AP dimension of the canal centrally to less than 6 mm      C6-C7:  Minimal bulge, mild canal stenosis      C7-T1:  Normal      UPPER THORACIC DISC SPACES:  Bulging disc at T2-T3, T3-T4, and T4-T5 exacerbated by bilateral facet arthrosis and redundancy, possible ossification of the ligamentum flavum  Dedicated MR of the thoracic spine is suggested      IMPRESSION:     Moderate degree of congenital canal stenosis which is exacerbated by broad-based asymmetric left C3-4, and central C5-6 protrusions  Cord compression at each level, more severe at C3-4 where a edema/gliosis are noted within the cord proper    Left C3-C4   foraminal stenosis, correlate for left C4 radiculitis      Surgical intervention/evaluation is suggested      The study was marked in EPIC for significant and one month follow-up notification

## 2022-01-06 ENCOUNTER — CONSULT (OUTPATIENT)
Dept: NEUROSURGERY | Facility: CLINIC | Age: 47
End: 2022-01-06
Payer: COMMERCIAL

## 2022-01-06 VITALS
BODY MASS INDEX: 32.78 KG/M2 | TEMPERATURE: 97.8 F | WEIGHT: 204 LBS | DIASTOLIC BLOOD PRESSURE: 78 MMHG | SYSTOLIC BLOOD PRESSURE: 142 MMHG | RESPIRATION RATE: 16 BRPM | HEIGHT: 66 IN | HEART RATE: 64 BPM

## 2022-01-06 DIAGNOSIS — M48.02 CERVICAL SPINAL STENOSIS: ICD-10-CM

## 2022-01-06 DIAGNOSIS — M47.12 OTHER SPONDYLOSIS WITH MYELOPATHY, CERVICAL REGION: ICD-10-CM

## 2022-01-06 DIAGNOSIS — G95.20 SPINAL CORD COMPRESSION (HCC): Primary | ICD-10-CM

## 2022-01-06 PROCEDURE — 99204 OFFICE O/P NEW MOD 45 MIN: CPT | Performed by: NEUROLOGICAL SURGERY

## 2022-01-06 NOTE — PROGRESS NOTES
Assessment/Plan:    No problem-specific Assessment & Plan notes found for this encounter  Problem List Items Addressed This Visit     None      Visit Diagnoses     Spinal cord compression ([facility] [address] )    -  Primary    Cervical spinal stenosis        Other spondylosis with myelopathy, cervical region                Subjective:      Patient ID: Alida Deal is a 42 y o  male  HPI    The following portions of the patient's history were reviewed and updated as appropriate: He  has a past medical history of Diverticulitis, Hyperlipemia, Hypertension, Prediabetes, and Psoriasis  He   Patient Active Problem List    Diagnosis Date Noted    Chronic pain syndrome 01/04/2022    Cervical disc disorder with radiculopathy of mid-cervical region 01/04/2022    Cervicalgia 10/14/2021    Radiculopathy 10/14/2021    Sleep apnea 01/04/2021    New onset type 2 diabetes mellitus ([facility] [address] ) 12/16/2020    Left foot pain 12/16/2020    Status post colon resection 01/22/2020    Need for prophylactic vaccination and inoculation against influenza 10/10/2019    Diverticulitis large intestine w/o perforation or abscess w/o bleeding 09/03/2019    Moderate persistent asthma without complication 49/47/1551    Hyperlipidemia 12/26/2018    Essential hypertension 12/05/2018    Lateral epicondylitis of right elbow 12/05/2018    Psoriasis 12/05/2018     He  has a past surgical history that includes pr colonoscopy flx dx w/collj spec when pfrmd (N/A, 4/3/2019); Colonoscopy; pr lap,surg,colectomy, partial, w/anast (N/A, 1/8/2020); and pr sigmoidoscopy flx dx w/collj spec br/wa if pfrmd (N/A, 1/8/2020)  His family history includes Diabetes in his maternal grandmother and paternal grandfather  He  reports that he has quit smoking  He has a 60 00 pack-year smoking history  His smokeless tobacco use includes chew  He reports current alcohol use  He reports that he does not use drugs    Current Outpatient Medications   Medication Sig Dispense Refill    Cholecalciferol (VITAMIN D) 2000 units CAPS Take 1 capsule by mouth daily      Hydrocortisone Butyrate 0 1 % CREA as needed   3    losartan (COZAAR) 50 mg tablet Take 1 tablet (50 mg total) by mouth daily 90 tablet 1    methylPREDNISolone 4 MG tablet therapy pack Use as directed on package 21 each 0    pregabalin (LYRICA) 75 mg capsule Take 1 capsule (75 mg total) by mouth 2 (two) times a day 60 capsule 1    rosuvastatin (CRESTOR) 5 mg tablet Take 1 tablet (5 mg total) by mouth daily 90 tablet 1    Ustekinumab (STELARA SC) Inject under the skin every 3 (three) months       albuterol (PROVENTIL HFA,VENTOLIN HFA) 90 mcg/act inhaler Inhale 2 puffs every 6 (six) hours as needed for wheezing or shortness of breath (Patient not taking: Reported on 1/6/2022 ) 1 Inhaler 0    budesonide-formoterol (SYMBICORT) 80-4 5 MCG/ACT inhaler Inhale 2 puffs 2 (two) times a day Rinse mouth after use  (Patient not taking: Reported on 1/6/2022 ) 1 Inhaler 2    metFORMIN (GLUCOPHAGE) 500 mg tablet Take 1 tablet (500 mg total) by mouth daily with breakfast (Patient not taking: Reported on 1/6/2022 ) 90 tablet 1     No current facility-administered medications for this visit       Current Outpatient Medications on File Prior to Visit   Medication Sig    Cholecalciferol (VITAMIN D) 2000 units CAPS Take 1 capsule by mouth daily    Hydrocortisone Butyrate 0 1 % CREA as needed     losartan (COZAAR) 50 mg tablet Take 1 tablet (50 mg total) by mouth daily    methylPREDNISolone 4 MG tablet therapy pack Use as directed on package    pregabalin (LYRICA) 75 mg capsule Take 1 capsule (75 mg total) by mouth 2 (two) times a day    rosuvastatin (CRESTOR) 5 mg tablet Take 1 tablet (5 mg total) by mouth daily    Ustekinumab (STELARA SC) Inject under the skin every 3 (three) months     albuterol (PROVENTIL HFA,VENTOLIN HFA) 90 mcg/act inhaler Inhale 2 puffs every 6 (six) hours as needed for wheezing or shortness of breath (Patient not taking: Reported on 1/6/2022 )    budesonide-formoterol (SYMBICORT) 80-4 5 MCG/ACT inhaler Inhale 2 puffs 2 (two) times a day Rinse mouth after use  (Patient not taking: Reported on 1/6/2022 )    metFORMIN (GLUCOPHAGE) 500 mg tablet Take 1 tablet (500 mg total) by mouth daily with breakfast (Patient not taking: Reported on 1/6/2022 )     No current facility-administered medications on file prior to visit  He has No Known Allergies       Review of Systems   Constitutional: Negative  HENT: Negative  Eyes: Negative  Respiratory: Negative  Cardiovascular: Positive for palpitations  Gastrointestinal: Negative  Endocrine: Negative  Genitourinary: Negative  Musculoskeletal: Positive for gait problem and neck pain (when pt flex head backwards)  Skin: Negative  Neurological: Positive for weakness (b/l hands and some weakness with walking ) and numbness (b/l hands and right groin )  Since August numbness in arms     Hematological: Does not bruise/bleed easily  Psychiatric/Behavioral: Negative  Objective: There were no vitals taken for this visit  Physical Exam      I have personally obtain history and examined patient  I have personally reviewed case including all pertinent investigations/studies  Time spent 60 minutes  More than 50% of total time spent on counseling and coordination of care as described above including patient education, discussion of risks and rationale of conservative vs surgical treatment options  HPI    Chronic neck pain and stiffness with hx of stenosis and diabetes, currently off metformin  5-6 yr months hx of progressive UE and LE paraesthesia, loss of fine motor control, hand paraesthesia with extension  Describes difficulty with tools, handwriting and utensils  Denies bowel/bladder deficit      Exam    Moderate limitation in cervical ROM  Severe paravertebral spasm   positive lhermittes  Grade 4+/5 right  strength  Reduced sensation in bilateral UE in nonradicular distribution  Impaired fine motor  Increased DTR with L>R neil's and spreading of brachioradialis reflex  Mildly unsteady gait  Impaired tandem                        Back:     Symmetric, no curvature, ROM normal, no CVA tenderness       Chest wall:    No tenderness or deformity                   Extremities:   Extremities normal, atraumatic, no cyanosis or edema       Skin:   Skin color, texture, turgor normal, no rashes or lesions      Radiology    MRI    Pan cervical spinal spondylotic degeneration with disc space collapse and significant osteophyte formation causing profound segment central stenosis, severe cord compression at C3/4 > C5/6 with severe associated T2 cord signal change/myelomalacia  Summary and Plan    Mr Stanislav Rodriguez has progressive cervical myelopathy in the setting chronic neck pain and cervical radiculopathy with severe multilevel cervical spondylosis/stenosis and walter multilevel cord compression  I see no role for conservative treatment  We reviewed the natural history of her condition including the risk of progression vs catastrophic spinal cord injury in the setting of trauma  I asked him to refrain from chiropractic manipulation, axial loading and vigorous head and neck mov't  We also discuss the importance of optimizing diabetes management  Given the severity of his condition, I have referred him to my colleague Dr Nara Guevara for expedited evaluation/management  I will see him on a PRN basis

## 2022-01-07 ENCOUNTER — OFFICE VISIT (OUTPATIENT)
Dept: NEUROSURGERY | Facility: CLINIC | Age: 47
End: 2022-01-07
Payer: COMMERCIAL

## 2022-01-07 ENCOUNTER — OFFICE VISIT (OUTPATIENT)
Dept: LAB | Facility: CLINIC | Age: 47
End: 2022-01-07
Payer: COMMERCIAL

## 2022-01-07 ENCOUNTER — APPOINTMENT (OUTPATIENT)
Dept: LAB | Facility: CLINIC | Age: 47
End: 2022-01-07
Payer: COMMERCIAL

## 2022-01-07 VITALS
DIASTOLIC BLOOD PRESSURE: 102 MMHG | BODY MASS INDEX: 32.78 KG/M2 | SYSTOLIC BLOOD PRESSURE: 162 MMHG | HEIGHT: 66 IN | RESPIRATION RATE: 18 BRPM | TEMPERATURE: 99.2 F | WEIGHT: 204 LBS | HEART RATE: 70 BPM

## 2022-01-07 DIAGNOSIS — E78.5 HYPERLIPIDEMIA, UNSPECIFIED HYPERLIPIDEMIA TYPE: ICD-10-CM

## 2022-01-07 DIAGNOSIS — G95.20 SPINAL CORD COMPRESSION (HCC): ICD-10-CM

## 2022-01-07 DIAGNOSIS — Z12.5 SCREENING FOR PROSTATE CANCER: ICD-10-CM

## 2022-01-07 DIAGNOSIS — M48.02 CERVICAL SPINAL STENOSIS: ICD-10-CM

## 2022-01-07 DIAGNOSIS — E11.9 CONTROLLED TYPE 2 DIABETES MELLITUS WITHOUT COMPLICATION, WITHOUT LONG-TERM CURRENT USE OF INSULIN (HCC): ICD-10-CM

## 2022-01-07 DIAGNOSIS — M47.12 OTHER SPONDYLOSIS WITH MYELOPATHY, CERVICAL REGION: ICD-10-CM

## 2022-01-07 DIAGNOSIS — Z01.818 PRE-PROCEDURAL EXAMINATION: ICD-10-CM

## 2022-01-07 LAB
ALBUMIN SERPL BCP-MCNC: 4 G/DL (ref 3.5–5)
ALP SERPL-CCNC: 86 U/L (ref 46–116)
ALT SERPL W P-5'-P-CCNC: 142 U/L (ref 12–78)
ANION GAP SERPL CALCULATED.3IONS-SCNC: 12 MMOL/L (ref 4–13)
APTT PPP: 26 SECONDS (ref 23–37)
AST SERPL W P-5'-P-CCNC: 40 U/L (ref 5–45)
ATRIAL RATE: 60 BPM
BASOPHILS # BLD AUTO: 0.08 THOUSANDS/ΜL (ref 0–0.1)
BASOPHILS NFR BLD AUTO: 1 % (ref 0–1)
BILIRUB SERPL-MCNC: 0.36 MG/DL (ref 0.2–1)
BUN SERPL-MCNC: 15 MG/DL (ref 5–25)
CALCIUM SERPL-MCNC: 9.4 MG/DL (ref 8.3–10.1)
CHLORIDE SERPL-SCNC: 102 MMOL/L (ref 100–108)
CHOLEST SERPL-MCNC: 161 MG/DL
CO2 SERPL-SCNC: 25 MMOL/L (ref 21–32)
CREAT SERPL-MCNC: 0.94 MG/DL (ref 0.6–1.3)
CREAT UR-MCNC: 129 MG/DL
EOSINOPHIL # BLD AUTO: 0.02 THOUSAND/ΜL (ref 0–0.61)
EOSINOPHIL NFR BLD AUTO: 0 % (ref 0–6)
ERYTHROCYTE [DISTWIDTH] IN BLOOD BY AUTOMATED COUNT: 13.2 % (ref 11.6–15.1)
EST. AVERAGE GLUCOSE BLD GHB EST-MCNC: 128 MG/DL
GFR SERPL CREATININE-BSD FRML MDRD: 98 ML/MIN/1.73SQ M
GLUCOSE P FAST SERPL-MCNC: 100 MG/DL (ref 65–99)
HBA1C MFR BLD: 6.1 %
HCT VFR BLD AUTO: 43.5 % (ref 36.5–49.3)
HDLC SERPL-MCNC: 60 MG/DL
HGB BLD-MCNC: 14.6 G/DL (ref 12–17)
IMM GRANULOCYTES # BLD AUTO: 0.04 THOUSAND/UL (ref 0–0.2)
IMM GRANULOCYTES NFR BLD AUTO: 1 % (ref 0–2)
INR PPP: 1 (ref 0.84–1.19)
LDLC SERPL CALC-MCNC: 76 MG/DL (ref 0–100)
LYMPHOCYTES # BLD AUTO: 1.9 THOUSANDS/ΜL (ref 0.6–4.47)
LYMPHOCYTES NFR BLD AUTO: 23 % (ref 14–44)
MCH RBC QN AUTO: 29.9 PG (ref 26.8–34.3)
MCHC RBC AUTO-ENTMCNC: 33.6 G/DL (ref 31.4–37.4)
MCV RBC AUTO: 89 FL (ref 82–98)
MICROALBUMIN UR-MCNC: 9.2 MG/L (ref 0–20)
MICROALBUMIN/CREAT 24H UR: 7 MG/G CREATININE (ref 0–30)
MONOCYTES # BLD AUTO: 0.37 THOUSAND/ΜL (ref 0.17–1.22)
MONOCYTES NFR BLD AUTO: 5 % (ref 4–12)
NEUTROPHILS # BLD AUTO: 5.87 THOUSANDS/ΜL (ref 1.85–7.62)
NEUTS SEG NFR BLD AUTO: 70 % (ref 43–75)
NONHDLC SERPL-MCNC: 101 MG/DL
NRBC BLD AUTO-RTO: 0 /100 WBCS
P AXIS: 43 DEGREES
PLATELET # BLD AUTO: 270 THOUSANDS/UL (ref 149–390)
PMV BLD AUTO: 9.7 FL (ref 8.9–12.7)
POTASSIUM SERPL-SCNC: 3.8 MMOL/L (ref 3.5–5.3)
PR INTERVAL: 130 MS
PROT SERPL-MCNC: 7.4 G/DL (ref 6.4–8.2)
PROTHROMBIN TIME: 13.2 SECONDS (ref 11.6–14.5)
PSA SERPL-MCNC: 0.6 NG/ML (ref 0–4)
QRS AXIS: 52 DEGREES
QRSD INTERVAL: 92 MS
QT INTERVAL: 418 MS
QTC INTERVAL: 418 MS
RBC # BLD AUTO: 4.89 MILLION/UL (ref 3.88–5.62)
SODIUM SERPL-SCNC: 139 MMOL/L (ref 136–145)
T WAVE AXIS: 36 DEGREES
TRIGL SERPL-MCNC: 123 MG/DL
VENTRICULAR RATE: 60 BPM
WBC # BLD AUTO: 8.28 THOUSAND/UL (ref 4.31–10.16)

## 2022-01-07 PROCEDURE — 99215 OFFICE O/P EST HI 40 MIN: CPT | Performed by: NEUROLOGICAL SURGERY

## 2022-01-07 PROCEDURE — 83036 HEMOGLOBIN GLYCOSYLATED A1C: CPT

## 2022-01-07 PROCEDURE — 82570 ASSAY OF URINE CREATININE: CPT

## 2022-01-07 PROCEDURE — 3044F HG A1C LEVEL LT 7.0%: CPT | Performed by: FAMILY MEDICINE

## 2022-01-07 PROCEDURE — G0103 PSA SCREENING: HCPCS

## 2022-01-07 PROCEDURE — 36415 COLL VENOUS BLD VENIPUNCTURE: CPT

## 2022-01-07 PROCEDURE — 93010 ELECTROCARDIOGRAM REPORT: CPT | Performed by: INTERNAL MEDICINE

## 2022-01-07 PROCEDURE — 80053 COMPREHEN METABOLIC PANEL: CPT

## 2022-01-07 PROCEDURE — 85730 THROMBOPLASTIN TIME PARTIAL: CPT

## 2022-01-07 PROCEDURE — 87081 CULTURE SCREEN ONLY: CPT

## 2022-01-07 PROCEDURE — 85025 COMPLETE CBC W/AUTO DIFF WBC: CPT

## 2022-01-07 PROCEDURE — 82043 UR ALBUMIN QUANTITATIVE: CPT

## 2022-01-07 PROCEDURE — 80061 LIPID PANEL: CPT

## 2022-01-07 PROCEDURE — 85610 PROTHROMBIN TIME: CPT

## 2022-01-07 PROCEDURE — 93005 ELECTROCARDIOGRAM TRACING: CPT

## 2022-01-07 RX ORDER — CEFAZOLIN SODIUM 2 G/50ML
2000 SOLUTION INTRAVENOUS ONCE
Status: CANCELLED | OUTPATIENT
Start: 2022-01-07 | End: 2022-01-07

## 2022-01-07 NOTE — PROGRESS NOTES
Neurosurgery Office Note  Deandre Bill 42 y o  male MRN: [id number]      Assessment/Plan        Problem List Items Addressed This Visit     None      Visit Diagnoses     Cervical spinal stenosis        Spinal cord compression [facility])        Relevant Orders    Case request operating room: C3/4 ACDF with neuromonitoring (Completed)    Other spondylosis with myelopathy, cervical region                Discussion:  Patient is a 82-year-old male with h/o HLD, DM (HbA1C 5 7 on 8/13/21), HTN, diverticulitis, psoriasis, recent right elbow tendon surgery on 12/8/21 (wound healed, focal pain at elbow resolved) who p/w 5-6 months of progressively worsening BUE paresthesias which progressed to numbness diffusely in all fingertips for the past 2 months, loss of fine motor coordination in bilateral hands (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), and worsening gait imbalance  On several occasions 5-6 years ago, he bumped his head on something and experienced transient paralysis in BUE which resolved spontaneously after several hours  He has never experienced any radiating pain or numbness along shoulders to hands in C5 or C6 distribution  In fact, he has never experienced any significant pain in the neck or arms (just muscle soreness focally over bilateral shoulder joints when he experienced those transient episodes)  Denies bowel/bladder changes and prior spinal surgery  Denies taking AC  Not currently smoker, quit 2 years ago  He is referred to me by my partner in neurosurgery Dr Juanita Olivas for cervical myelopathy  Recently started Medrol dose pack and Gabapentin per Pain Management, without significant symptomatic relief so far  Briefly worked with PT in December 2021  MRI c-spine on 12/22/21 showed severe central and foraminal stenosis at C3/4 > C5/6 a/w cord compression and signal changes at C3/4 c/f edema       On exam, he has paresthesias in bilateral fingertips and exhibits diffuse hyper-reflexia and positive bilateral Sahus sign  At this time, I recommend C3/4 ACDF for critical spinal cord compression and canal stenosis causing myelopathy  We also discussed the disc herniation at C5/6, which is at risk of requiring additional treatment in the future but is not actively causing symptoms at this time (no radicular pain or numbness or weakness in C5/6 distributions)  I emphasized that the more acutely concerning issue is the spinal cord compression at C3/4 causing myelopathic symptoms (fine motor dyscoordination and gait imbalance)  He understands the goal of surgery (spinal cord decompression), as well as the potential risks and expected recovery course, and agrees to proceed - signing consent today  He will need updated preoperative labs and medical clearance  All questions and concerns were addressed during this visit  CHIEF COMPLAINT    Chief Complaint   Patient presents with    Follow-up     Spinal cord compression-Ref by Dr Lola Ewing    History of Present Illness     42y o  year old male     HPI    See Discussion    REVIEW OF SYSTEMS    Review of Systems   Constitutional: Negative  HENT: Negative  Eyes: Negative  Respiratory: Negative  Cardiovascular: Positive for palpitations  Gastrointestinal: Negative  Endocrine: Negative  Genitourinary: Negative  Musculoskeletal: Positive for gait problem and neck pain (when pt flex head backwards)  Sx present x 5 years     PT slpt [facility]  last OV 3 WEEKS AGO    Pain and Spine Last Ov 1/4/22    No LAKESHIA   Skin: Negative  Neurological: Positive for weakness (b/l hands and some weakness with walking ) and numbness (b/l hands and right groin )  Since August numbness in arms     Hematological: Does not bruise/bleed easily  Psychiatric/Behavioral: Negative  All other systems reviewed and are negative          Meds/Allergies     Current Outpatient Medications   Medication Sig Dispense Refill    albuterol (PROVENTIL HFA,VENTOLIN HFA) 90 mcg/act inhaler Inhale 2 puffs every 6 (six) hours as needed for wheezing or shortness of breath (Patient not taking: Reported on 1/6/2022 ) 1 Inhaler 0    budesonide-formoterol (SYMBICORT) 80-4 5 MCG/ACT inhaler Inhale 2 puffs 2 (two) times a day Rinse mouth after use  (Patient not taking: Reported on 1/6/2022 ) 1 Inhaler 2    Cholecalciferol (VITAMIN D) 2000 units CAPS Take 1 capsule by mouth daily      Hydrocortisone Butyrate 0 1 % CREA as needed   3    losartan (COZAAR) 50 mg tablet Take 1 tablet (50 mg total) by mouth daily 90 tablet 1    metFORMIN (GLUCOPHAGE) 500 mg tablet Take 1 tablet (500 mg total) by mouth daily with breakfast (Patient not taking: Reported on 1/6/2022 ) 90 tablet 1    methylPREDNISolone 4 MG tablet therapy pack Use as directed on package 21 each 0    pregabalin (LYRICA) 75 mg capsule Take 1 capsule (75 mg total) by mouth 2 (two) times a day 60 capsule 1    rosuvastatin (CRESTOR) 5 mg tablet Take 1 tablet (5 mg total) by mouth daily 90 tablet 1    Ustekinumab (STELARA SC) Inject under the skin every 3 (three) months        No current facility-administered medications for this visit  No Known Allergies    PAST HISTORY    Past Medical History:   Diagnosis Date    Diverticulitis     Hyperlipemia     Hypertension     Prediabetes     Psoriasis        Past Surgical History:   Procedure Laterality Date    COLONOSCOPY      NM COLONOSCOPY FLX DX W/COLLJ SPEC WHEN PFRMD N/A 4/3/2019    Procedure: COLONOSCOPY;  Surgeon: Genesis Ozuna MD;  [facility];   Service: Colorectal    NM LAP,SURG,COLECTOMY, PARTIAL, W/ANAST N/A 1/8/2020    Procedure: RESECTION COLON SIGMOID LAPAROSCOPIC, LAPAROSCOPIC SPLENIC FLEXURE MOBILIZATION;  Surgeon: Genesis Ozuna MD;  Location: [facility];  Service: Colorectal    NM SIGMOIDOSCOPY FLX DX W/COLLJ SPEC BR/WA IF PFRMD N/A 1/8/2020    Procedure: SIGMOIDOSCOPY FLEXIBLE;  Surgeon: Valeriy Bernstein MD;  Location: [facility];  Service: Colorectal       Social History     Tobacco Use    Smoking status: Former Smoker     Packs/day: 2 00     Years: 30 00     Pack years: 60 00    Smokeless tobacco: Current User     Types: Chew    Tobacco comment: Quit 20 months ago   Vaping Use    Vaping Use: Never used   Substance Use Topics    Alcohol use: Yes     Comment: occasional alcohol use    Drug use: No       Family History   Problem Relation Age of Onset    Diabetes Maternal Grandmother     Diabetes Paternal Grandfather     Colon cancer Neg Hx          The following portions of the patient's history were reviewed in this encounter and updated as appropriate: Past medical, surgical, family, and social history, as well as medications, allergies, and review of systems  EXAM    Vitals:Blood pressure (!) 162/102, pulse 70, temperature 99 2 °F (37 3 °C), temperature source Temporal, resp  rate 18, height 5' 6" (1 676 m), weight 92 5 kg (204 lb)  ,There is no height or weight on file to calculate BMI  Physical Exam  Vitals and nursing note reviewed  Constitutional:       Appearance: Normal appearance  He is normal weight  HENT:      Head: Normocephalic and atraumatic  Eyes:      Extraocular Movements: Extraocular movements intact and EOM normal       Pupils: Pupils are equal, round, and reactive to light  Cardiovascular:      Rate and Rhythm: Normal rate and regular rhythm  Pulses: Normal pulses  Heart sounds: Normal heart sounds  Pulmonary:      Effort: Pulmonary effort is normal       Breath sounds: Normal breath sounds  Abdominal:      General: Abdomen is flat  Palpations: Abdomen is soft  Musculoskeletal:         General: Normal range of motion  Cervical back: Normal range of motion  Neurological:      General: No focal deficit present  Mental Status: He is alert and oriented to person, place, and time  Mental status is at baseline        GCS: GCS eye subscore is 4  GCS verbal subscore is 5  GCS motor subscore is 6  Cranial Nerves: Cranial nerves are intact  Sensory: Sensation is intact  Motor: Motor function is intact  Coordination: Coordination is intact  Gait: Gait is intact  Deep Tendon Reflexes: Strength normal and reflexes are normal and symmetric  Psychiatric:         Mood and Affect: Mood normal          Speech: Speech normal          Behavior: Behavior normal          Neurologic Exam     Mental Status   Oriented to person, place, and time  Attention: normal    Speech: speech is normal   Level of consciousness: alert    Cranial Nerves     CN II   Visual fields full to confrontation  Visual acuity: normal  Right visual field deficit: none  Left visual field deficit: none     CN III, IV, VI   Pupils are equal, round, and reactive to light  Extraocular motions are normal    CN III: no CN III palsy  CN VI: no CN VI palsy  Nystagmus: none   Diplopia: none  Ophthalmoparesis: none  Upgaze: normal  Downgaze: normal  Conjugate gaze: present    CN V   Facial sensation intact  Right facial sensation deficit: none  Left facial sensation deficit: none    CN VII   Facial expression full, symmetric  Right facial weakness: none  Left facial weakness: none    CN VIII   CN VIII normal      CN IX, X   CN IX normal    CN X normal    Palate: symmetric    CN XI   CN XI normal    Right sternocleidomastoid strength: normal  Left sternocleidomastoid strength: normal  Right trapezius strength: normal  Left trapezius strength: normal    CN XII   CN XII normal    Tongue deviation: none    Motor Exam   Muscle bulk: normal  Overall muscle tone: normal  Right arm pronator drift: absent  Left arm pronator drift: absent    Strength   Strength 5/5 throughout       Sensory Exam     Paresthesias in bilateral fingertips      Gait, Coordination, and Reflexes     Gait  Gait: normal  Diffuse hyper-reflexia (3+)   Positive Sahu's sign bilaterally           MEDICAL DECISION MAKING    Imaging Studies:     MRI cervical spine without contrast    Result Date: 12/23/2021  Narrative: MRI CERVICAL SPINE WITHOUT CONTRAST INDICATION: M50 120: Mid-cervical disc disorder, unspecified level  Worsening neck pain, radiating into arms with numbness and weakness COMPARISON:  X-ray 12/16/2019 TECHNIQUE:  Sagittal T1, sagittal T2, sagittal inversion recovery, axial T2, axial  2D merge IMAGE QUALITY:  Diagnostic FINDINGS: ALIGNMENT:  Straightening of normal cervical lordosis without fracture or subluxation  MARROW SIGNAL:  Normal marrow signal is identified within the visualized bony structures  No discrete marrow lesion  CERVICAL AND VISUALIZED THORACIC CORD:  Moderate degree of congenital canal stenosis which is exacerbated by spondylosis and osteoarthritis, and is most prevalent at the C3-C4 and C5-C6 levels where AP dimension of the canal approaches 5-6 mm  Cord compression is present at both level with bilateral right greater than left gliosis/edema at C3-C4  PREVERTEBRAL AND PARASPINAL SOFT TISSUES:  Normal  VISUALIZED POSTERIOR FOSSA:  The visualized posterior fossa demonstrates no abnormal signal  CERVICAL DISC SPACES: C2-C3:  Minor bilateral facet arthrosis  C3-C4:  Diffuse posterior disc bulge, asymmetric to the left  AP dimension of the canal reduced to less than 5 mm  Osteophytes extend posteriorly bilaterally, asymmetric to the left resulting in left greater than right foraminal stenosis, correlate for  bilateral C4 radiculitis  C4-C5:  Mild right facet arthrosis, minor canal stenosis  C5-C6:  Central disc protrusion with central flattening of the cord  No definite edema  AP dimension of the canal centrally to less than 6 mm  C6-C7:  Minimal bulge, mild canal stenosis   C7-T1:  Normal  UPPER THORACIC DISC SPACES:  Bulging disc at T2-T3, T3-T4, and T4-T5 exacerbated by bilateral facet arthrosis and redundancy, possible ossification of the ligamentum flavum  Dedicated MR of the thoracic spine is suggested  Impression: Moderate degree of congenital canal stenosis which is exacerbated by broad-based asymmetric left C3-4, and central C5-6 protrusions  Cord compression at each level, more severe at C3-4 where a edema/gliosis are noted within the cord proper  Left C3-C4 foraminal stenosis, correlate for left C4 radiculitis  Surgical intervention/evaluation is suggested  The study was marked in EPIC for significant and one month follow-up notification  Workstation performed: [id number]     EMG 2 Limb Upper Extremity    Result Date: 12/21/2021  Narrative: Dieudonne Miguel MD     12/21/2021 10:46 AM   EMG 2 Limb Upper Extremity  Date/Time 12/21/2021 10:38 AM  Performed by  Dieudonne Miguel MD  Authorized by FUNMI Grace        I have personally reviewed pertinent films in [facility] Siem[facility] M D    Neurosurgeon

## 2022-01-08 LAB — MRSA NOSE QL CULT: NORMAL

## 2022-01-18 RX ORDER — MULTIVIT-MIN/IRON FUM/FOLIC AC 7.5 MG-4
1 TABLET ORAL DAILY
COMMUNITY

## 2022-01-18 NOTE — PRE-PROCEDURE INSTRUCTIONS
Pre-Surgery Instructions:   Medication Instructions    Cholecalciferol (VITAMIN D) 2000 units CAPS Instructed patient per Anesthesia Guidelines  stop 1/19    losartan (COZAAR) 50 mg tablet Instructed patient per Anesthesia Guidelines  HOLD 1/26    Multiple Vitamins-Minerals (multivitamin with minerals) tablet Instructed patient per Anesthesia Guidelines  stop 1/19    pregabalin (LYRICA) 75 mg capsule Instructed patient per Anesthesia Guidelines  take 1/26    rosuvastatin (CRESTOR) 5 mg tablet Instructed patient per Anesthesia Guidelines  take 1/26   Pre procedure instructions reviewed verbalizes understanding  NPO after MN  Bathing reviewed  Morning meds with water  No NSAIDS  Stop supplements/vitamins   Follow office instructions for bathing

## 2022-01-20 ENCOUNTER — OFFICE VISIT (OUTPATIENT)
Dept: FAMILY MEDICINE CLINIC | Facility: CLINIC | Age: 47
End: 2022-01-20
Payer: COMMERCIAL

## 2022-01-20 VITALS
RESPIRATION RATE: 16 BRPM | WEIGHT: 210 LBS | HEIGHT: 66 IN | HEART RATE: 83 BPM | BODY MASS INDEX: 33.75 KG/M2 | OXYGEN SATURATION: 98 % | DIASTOLIC BLOOD PRESSURE: 80 MMHG | SYSTOLIC BLOOD PRESSURE: 132 MMHG

## 2022-01-20 DIAGNOSIS — E78.5 HYPERLIPIDEMIA, UNSPECIFIED HYPERLIPIDEMIA TYPE: ICD-10-CM

## 2022-01-20 DIAGNOSIS — M50.120 CERVICAL DISC DISORDER WITH RADICULOPATHY OF MID-CERVICAL REGION: ICD-10-CM

## 2022-01-20 DIAGNOSIS — I10 ESSENTIAL HYPERTENSION: ICD-10-CM

## 2022-01-20 DIAGNOSIS — E11.9 CONTROLLED TYPE 2 DIABETES MELLITUS WITHOUT COMPLICATION, WITHOUT LONG-TERM CURRENT USE OF INSULIN (HCC): ICD-10-CM

## 2022-01-20 DIAGNOSIS — Z01.818 PRE-OPERATIVE CLEARANCE: Primary | ICD-10-CM

## 2022-01-20 PROCEDURE — 99214 OFFICE O/P EST MOD 30 MIN: CPT | Performed by: FAMILY MEDICINE

## 2022-01-20 PROCEDURE — 3079F DIAST BP 80-89 MM HG: CPT | Performed by: FAMILY MEDICINE

## 2022-01-20 PROCEDURE — 3075F SYST BP GE 130 - 139MM HG: CPT | Performed by: FAMILY MEDICINE

## 2022-01-20 RX ORDER — ROSUVASTATIN CALCIUM 5 MG/1
5 TABLET, COATED ORAL DAILY
Qty: 90 TABLET | Refills: 1 | Status: SHIPPED | OUTPATIENT
Start: 2022-01-20 | End: 2022-06-23

## 2022-01-20 RX ORDER — LOSARTAN POTASSIUM 50 MG/1
50 TABLET ORAL DAILY
Qty: 90 TABLET | Refills: 1 | Status: SHIPPED | OUTPATIENT
Start: 2022-01-20 | End: 2022-06-23

## 2022-01-20 NOTE — ASSESSMENT & PLAN NOTE
Patient's blood pressure  is back to normal after he restarted losartan 50 milligram   Advised not to discontinue medication  Renal function stable

## 2022-01-20 NOTE — ASSESSMENT & PLAN NOTE
Lab Results   Component Value Date    HGBA1C 6 1 (H) 01/07/2022     A1c well controlled  Patient stopped metformin almost 2 months ago  Has been strictly following low carb diet  Prefers not to restart metformin  Will reassess A1c x 3 months

## 2022-01-20 NOTE — PROGRESS NOTES
Subjective:      Patient ID: Claudine Ruelas is a 42 y o  male  Chief Complaint   Patient presents with    Pre-op Exam       HPI  Patient is here for preop clearance for cervical spinal stenosis surgery  Had progressively worsening symptoms of bilateral upper extremity paresthesia  MRI showed severe central and foraminal stenosis and cord compression  Patient is scheduled for surgery on/26/22  Patient has history of type 2 diabetes  A1c well controlled on low-dose metformin  Patient has followed stick low carb diet so he discontinue metformin 2 months ago  His A1c is normal   Also has history of hypertension for which she has been on losartan 50 milligram   Patient discontinue losartan for a short period of time following which he noted that his blood pressure was high so he restarted losartan  His blood pressure is at goal   Patient advised not to discontinue medications without medical advise  Has history of hyperlipidemia  LDL is at goal   Patient takes Crestor 5 milligram   Noted to have borderline high ALT  Patient admits to drinking beer on a daily basis  Advised to discontinue  alcohol consumption  The following portions of the patient's history were reviewed and updated as appropriate: allergies, current medications, past family history, past medical history, past social history, past surgical history and problem list     Procedure date: 1/26/22    Surgeon:  Dr Valdo Sarah    Planned procedure:  C3-4 anterior cervical dissection and fusion  Diagnosis for procedure: Spinal cord compression    Prior anesthesia: yes       Problem with anesthesia: no    CAD History: no    SARA history:no    Exercise Capacity:  · Able to walk 4 blocks without symptoms?: Yes  · Able to walk 2 flights without symptoms?: Yes        Review of Systems   Constitutional: Negative  Respiratory: Negative  Negative for shortness of breath  Cardiovascular: Negative  Negative for chest pain and palpitations  Gastrointestinal: Negative  Endocrine: Negative  Genitourinary: Negative  Musculoskeletal: Negative  Negative for myalgias  Allergic/Immunologic: Negative  Neurological: Negative  Negative for headaches  Psychiatric/Behavioral: Negative  Current Outpatient Medications   Medication Sig Dispense Refill    Cholecalciferol (VITAMIN D) 2000 units CAPS Take 1 capsule by mouth daily      losartan (COZAAR) 50 mg tablet Take 1 tablet (50 mg total) by mouth daily 90 tablet 1    Multiple Vitamins-Minerals (multivitamin with minerals) tablet Take 1 tablet by mouth daily      pregabalin (LYRICA) 75 mg capsule Take 1 capsule (75 mg total) by mouth 2 (two) times a day 60 capsule 1    rosuvastatin (CRESTOR) 5 mg tablet Take 1 tablet (5 mg total) by mouth daily 90 tablet 1    Ustekinumab (STELARA SC) Inject under the skin every 3 (three) months       albuterol (PROVENTIL HFA,VENTOLIN HFA) 90 mcg/act inhaler Inhale 2 puffs every 6 (six) hours as needed for wheezing or shortness of breath (Patient not taking: Reported on 1/6/2022 ) 1 Inhaler 0    budesonide-formoterol (SYMBICORT) 80-4 5 MCG/ACT inhaler Inhale 2 puffs 2 (two) times a day Rinse mouth after use  (Patient not taking: Reported on 1/6/2022 ) 1 Inhaler 2    Hydrocortisone Butyrate 0 1 % CREA as needed   3    metFORMIN (GLUCOPHAGE) 500 mg tablet Take 1 tablet (500 mg total) by mouth daily with breakfast (Patient not taking: Reported on 1/6/2022 ) 90 tablet 1    methylPREDNISolone 4 MG tablet therapy pack Use as directed on package 21 each 0     No current facility-administered medications for this visit  Patient has no known allergies      Past Medical History:   Diagnosis Date    Diverticulitis     Hyperlipemia     Hypertension     Prediabetes     Psoriasis        Past Surgical History:   Procedure Laterality Date    ABDOMINAL SURGERY      COLONOSCOPY      [place] COLONOSCOPY FLX DX W/COLLJ SPEC WHEN PFRMD N/A 4/3/2019 Procedure: COLONOSCOPY;  Surgeon: Abdi Valdez MD;  [facility]; Service: Colorectal    [place] LAP,SURG,COLECTOMY, PARTIAL, W/ANAST N/A 1/8/2020    Procedure: RESECTION COLON SIGMOID LAPAROSCOPIC, LAPAROSCOPIC SPLENIC FLEXURE MOBILIZATION;  Surgeon: Abdi Valdez MD;  [facility];  Service: Colorectal    [place] SIGMOIDOSCOPY FLX DX W/COLLJ SPEC BR/WA IF PFRMD N/A 1/8/2020    Procedure: Radha Bush;  Surgeon: Abdi Valdez MD;  [facility];  Service: Colorectal    TENDON REPAIR      right elbow       Family History   Problem Relation Age of Onset    Diabetes Maternal Grandmother     Diabetes Paternal Grandfather     Cancer Father     Colon cancer Neg Hx        Social History     Tobacco Use    Smoking status: Former Smoker     Packs/day: 2 00     Years: 30 00     Pack years: 60 00    Smokeless tobacco: Current User     Types: Chew    Tobacco comment: Quit 20 months ago   Vaping Use    Vaping Use: Never used   Substance Use Topics    Alcohol use: Yes     Comment: occasional alcohol use    Drug use: Yes     Types: Marijuana     Comment: every couple weeks       Objective:    Vitals:    01/20/22 0757   BP: 132/80   Pulse: 83   Resp: 16   SpO2: 98%   Weight: 95 3 kg (210 lb)   Height: 5' 6" (1 676 m)        Physical Exam  Constitutional:       Appearance: He is well-developed  HENT:      Mouth/Throat:      Pharynx: No oropharyngeal exudate  Cardiovascular:      Rate and Rhythm: Normal rate and regular rhythm  Pulmonary:      Effort: Pulmonary effort is normal       Breath sounds: Normal breath sounds  Abdominal:      General: Bowel sounds are normal       Palpations: Abdomen is soft  Neurological:      Mental Status: He is alert and oriented to person, place, and time     Psychiatric:         Behavior: Behavior normal          Judgment: Judgment normal            Preop labs/testing available and reviewed: yes    Laboratory Results: I have personally reviewed the pertinent laboratory results/reports     EKG: I have personally reviewed pertinent reports  Chest x-ray: NA      Assessment/Plan:    Patient is cleared for planned procedure  Further testing/evaluation is not required  Postop concerns: no         Problem List Items Addressed This Visit        Endocrine    Controlled type 2 diabetes mellitus without complication, without long-term current use of insulin ([facility] )       Lab Results   Component Value Date    HGBA1C 6 1 (H) 01/07/2022     A1c well controlled  Patient stopped metformin almost 2 months ago  Has been strictly following low carb diet  Prefers not to restart metformin  Will reassess A1c x 3 months  Relevant Orders    Comprehensive metabolic panel    Hemoglobin A1C       Cardiovascular and Mediastinum    Essential hypertension     Patient's blood pressure  is back to normal after he restarted losartan 50 milligram   Advised not to discontinue medication  Renal function stable  Relevant Medications    losartan (COZAAR) 50 mg tablet       Nervous and Auditory    Cervical disc disorder with radiculopathy of mid-cervical region     Patient scheduled for surgery            Other    Hyperlipidemia     Cholesterol panel improved significantly  Continue Crestor 5 milligram         Relevant Medications    rosuvastatin (CRESTOR) 5 mg tablet    Other Relevant Orders    Lipid panel    Pre-operative clearance - Primary     PATIENT IS AT  MILD CARDIOVASCULAR RISK FOR THE SURGERY  ADVISED RE-EVALUATION IF HE DEVELOPS ANY SYMPTOMS OF CHEST PAIN OR SHORTNESS OF BREATH PRIOR TO SURGERY  Elevated liver enzyme likely secondary to daily alcohol consumption  Patient is willing to stop daily alcohol consumption  Will reassess labs at 3 months interval and follow him up thereafter

## 2022-01-20 NOTE — ASSESSMENT & PLAN NOTE
PATIENT IS AT  MILD CARDIOVASCULAR RISK FOR THE SURGERY  ADVISED RE-EVALUATION IF HE DEVELOPS ANY SYMPTOMS OF CHEST PAIN OR SHORTNESS OF BREATH PRIOR TO SURGERY

## 2022-01-25 ENCOUNTER — ANESTHESIA EVENT (OUTPATIENT)
Dept: PERIOP | Facility: HOSPITAL | Age: 47
End: 2022-01-25
Payer: COMMERCIAL

## 2022-01-25 RX ORDER — CEFAZOLIN SODIUM 2 G/50ML
2000 SOLUTION INTRAVENOUS ONCE
Status: COMPLETED | OUTPATIENT
Start: 2022-01-25 | End: 2022-01-26

## 2022-01-25 NOTE — H&P
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), and worsening gait imbalance now undergoing C3/4 ACDF  No changes per patient since my last evaluation in clinic  HPI  Please refer to my office note for full HPI leading up to this surgery  History:  Past Medical History:   Diagnosis Date    Diverticulitis     Hyperlipemia     Hypertension     Prediabetes     Psoriasis      Past Surgical History:   Procedure Laterality Date    ABDOMINAL SURGERY      COLONOSCOPY      [place] COLONOSCOPY FLX DX W/COLLJ SPEC WHEN PFRMD N/A 4/3/2019    Procedure: COLONOSCOPY;  Surgeon: Alia Cee MD;  [facility];   Service: Colorectal    [place] LAP,SURG,COLECTOMY, PARTIAL, W/ANAST N/A 1/8/2020    Procedure: RESECTION COLON SIGMOID LAPAROSCOPIC, LAPAROSCOPIC SPLENIC FLEXURE MOBILIZATION;  Surgeon: Alia Cee MD;  [facility];  Service: Colorectal    [place] SIGMOIDOSCOPY FLX DX W/COLLJ SPEC BR/WA IF PFRMD N/A 1/8/2020    Procedure: SIGMOIDOSCOPY FLEXIBLE;  Surgeon: Alia Cee MD;  [facility];  Service: Colorectal    TENDON REPAIR      right elbow       Current medications:    Current Facility-Administered Medications:     ceFAZolin (ANCEF) IVPB (premix in dextrose) 2,000 mg 50 mL, 2,000 mg, Intravenous, Once, Elisabeth Santiago MD  [facility] ON 1/26/2022] vancomycin (VANCOCIN) 1,000 mg, neomycin-polymyxin B (NEOSPORIN-) 1 mL in sodium chloride 0 9 % 1,000 mL OR irrigation, , Irrigation, Once, Serjio List Salma Hayden MD    Current Outpatient Medications:     Cholecalciferol (VITAMIN D) 2000 units CAPS, Take 1 capsule by mouth daily, Disp: , Rfl:     Multiple Vitamins-Minerals (multivitamin with minerals) tablet, Take 1 tablet by mouth daily, Disp: , Rfl:     pregabalin (LYRICA) 75 mg capsule, Take 1 capsule (75 mg total) by mouth 2 (two) times a day, Disp: 60 capsule, Rfl: 1    albuterol (PROVENTIL HFA,VENTOLIN HFA) 90 mcg/act inhaler, Inhale 2 puffs every 6 (six) hours as needed for wheezing or shortness of breath (Patient not taking: Reported on 1/6/2022 ), Disp: 1 Inhaler, Rfl: 0    budesonide-formoterol (SYMBICORT) 80-4 5 MCG/ACT inhaler, Inhale 2 puffs 2 (two) times a day Rinse mouth after use   (Patient not taking: Reported on 1/6/2022 ), Disp: 1 Inhaler, Rfl: 2    Hydrocortisone Butyrate 0 1 % CREA, as needed , Disp: , Rfl: 3    losartan (COZAAR) 50 mg tablet, Take 1 tablet (50 mg total) by mouth daily, Disp: 90 tablet, Rfl: 1    metFORMIN (GLUCOPHAGE) 500 mg tablet, Take 1 tablet (500 mg total) by mouth daily with breakfast (Patient not taking: Reported on 1/6/2022 ), Disp: 90 tablet, Rfl: 1    methylPREDNISolone 4 MG tablet therapy pack, Use as directed on package, Disp: 21 each, Rfl: 0    rosuvastatin (CRESTOR) 5 mg tablet, Take 1 tablet (5 mg total) by mouth daily, Disp: 90 tablet, Rfl: 1    Ustekinumab (STELARA SC), Inject under the skin every 3 (three) months , Disp: , Rfl:     Allergies:  No Known Allergies    Review of systems:  General ROS: negative for chills, fatigue, fever, night sweats, or unintentional weight changes  Respiratory ROS: no cough, shortness of breath, or wheezing  Cardiovascular ROS: no chest pain or dyspnea on exertion  Gastrointestinal ROS: no abdominal pain, change in bowel habits, or black or bloody stools  Genito-Urinary ROS: no dysuria, trouble voiding, or hematuria  Musculoskeletal ROS: negative  Neurological ROS: negative except for symptoms outlined in HPI and Discussion     Physical exam:  Unchanged from my last clinic note (please see chart)     Lab Results: All relevant preoperative labs reviewed  Imaging: All relevant preoperative imaging reviewed  EKG, Pathology, and Other Studies: All relevant preoperative studies reviewed    We will proceed with surgery as planned      Kathy Sky MD

## 2022-01-26 ENCOUNTER — ANESTHESIA (OUTPATIENT)
Dept: PERIOP | Facility: HOSPITAL | Age: 47
End: 2022-01-26
Payer: COMMERCIAL

## 2022-01-26 ENCOUNTER — APPOINTMENT (OUTPATIENT)
Dept: RADIOLOGY | Facility: HOSPITAL | Age: 47
End: 2022-01-26
Payer: COMMERCIAL

## 2022-01-26 ENCOUNTER — HOSPITAL ENCOUNTER (OUTPATIENT)
Facility: HOSPITAL | Age: 47
Setting detail: OUTPATIENT SURGERY
Discharge: HOME | End: 2022-01-26
Attending: NEUROLOGICAL SURGERY | Admitting: NEUROLOGICAL SURGERY
Payer: COMMERCIAL

## 2022-01-26 VITALS
OXYGEN SATURATION: 98 % | TEMPERATURE: 97.3 F | HEART RATE: 78 BPM | RESPIRATION RATE: 20 BRPM | SYSTOLIC BLOOD PRESSURE: 128 MMHG | DIASTOLIC BLOOD PRESSURE: 56 MMHG

## 2022-01-26 DIAGNOSIS — Z98.1 S/P CERVICAL SPINAL FUSION: Primary | ICD-10-CM

## 2022-01-26 LAB
GLUCOSE SERPL-MCNC: 109 MG/DL (ref 65–140)
GLUCOSE SERPL-MCNC: 128 MG/DL (ref 65–140)

## 2022-01-26 PROCEDURE — 82948 REAGENT STRIP/BLOOD GLUCOSE: CPT

## 2022-01-26 PROCEDURE — 22551 ARTHRD ANT NTRBDY CERVICAL: CPT | Performed by: NEUROLOGICAL SURGERY

## 2022-01-26 PROCEDURE — 22845 INSERT SPINE FIXATION DEVICE: CPT | Performed by: NEUROLOGICAL SURGERY

## 2022-01-26 PROCEDURE — 22853 INSJ BIOMECHANICAL DEVICE: CPT | Performed by: NEUROLOGICAL SURGERY

## 2022-01-26 PROCEDURE — 99024 POSTOP FOLLOW-UP VISIT: CPT | Performed by: NEUROLOGICAL SURGERY

## 2022-01-26 PROCEDURE — 72040 X-RAY EXAM NECK SPINE 2-3 VW: CPT

## 2022-01-26 PROCEDURE — C1713 ANCHOR/SCREW BN/BN,TIS/BN: HCPCS | Performed by: NEUROLOGICAL SURGERY

## 2022-01-26 PROCEDURE — 20930 SP BONE ALGRFT MORSEL ADD-ON: CPT | Performed by: NEUROLOGICAL SURGERY

## 2022-01-26 DEVICE — PLATE 3001019 ZEVO 19MM 1 LVL
Type: IMPLANTABLE DEVICE | Site: SPINE CERVICAL | Status: FUNCTIONAL
Brand: ZEVO™ ANTERIOR CERVICAL PLATE SYSTEM

## 2022-01-26 DEVICE — DBM T45001 1CC PASTE GRAFTON PLUS
Type: IMPLANTABLE DEVICE | Site: SPINE CERVICAL | Status: FUNCTIONAL
Brand: GRAFTON®AND GRAFTON PLUS®DEMINERALIZED BONE MATRIX (DBM)

## 2022-01-26 DEVICE — IMPLANT 6240641 ANATOMIC 14X11X6MM
Type: IMPLANTABLE DEVICE | Site: SPINE CERVICAL | Status: FUNCTIONAL
Brand: VERTE-STACK® SPINAL SYSTEM

## 2022-01-26 RX ORDER — OXYCODONE HYDROCHLORIDE 5 MG/1
5 TABLET ORAL EVERY 6 HOURS PRN
Qty: 30 TABLET | Refills: 0 | Status: SHIPPED | OUTPATIENT
Start: 2022-01-26 | End: 2022-02-03 | Stop reason: SDUPTHER

## 2022-01-26 RX ORDER — LIDOCAINE HYDROCHLORIDE 10 MG/ML
INJECTION, SOLUTION EPIDURAL; INFILTRATION; INTRACAUDAL; PERINEURAL AS NEEDED
Status: DISCONTINUED | OUTPATIENT
Start: 2022-01-26 | End: 2022-01-26

## 2022-01-26 RX ORDER — MAGNESIUM HYDROXIDE 1200 MG/15ML
LIQUID ORAL AS NEEDED
Status: DISCONTINUED | OUTPATIENT
Start: 2022-01-26 | End: 2022-01-26 | Stop reason: HOSPADM

## 2022-01-26 RX ORDER — DOCUSATE SODIUM 100 MG/1
100 CAPSULE, LIQUID FILLED ORAL 2 TIMES DAILY
Qty: 20 CAPSULE | Refills: 0 | Status: SHIPPED | OUTPATIENT
Start: 2022-01-26

## 2022-01-26 RX ORDER — SODIUM CHLORIDE, SODIUM LACTATE, POTASSIUM CHLORIDE, CALCIUM CHLORIDE 600; 310; 30; 20 MG/100ML; MG/100ML; MG/100ML; MG/100ML
INJECTION, SOLUTION INTRAVENOUS CONTINUOUS PRN
Status: DISCONTINUED | OUTPATIENT
Start: 2022-01-26 | End: 2022-01-26

## 2022-01-26 RX ORDER — ONDANSETRON 2 MG/ML
INJECTION INTRAMUSCULAR; INTRAVENOUS AS NEEDED
Status: DISCONTINUED | OUTPATIENT
Start: 2022-01-26 | End: 2022-01-26

## 2022-01-26 RX ORDER — MIDAZOLAM HYDROCHLORIDE 2 MG/2ML
INJECTION, SOLUTION INTRAMUSCULAR; INTRAVENOUS AS NEEDED
Status: DISCONTINUED | OUTPATIENT
Start: 2022-01-26 | End: 2022-01-26

## 2022-01-26 RX ORDER — FENTANYL CITRATE/PF 50 MCG/ML
25 SYRINGE (ML) INJECTION
Status: DISCONTINUED | OUTPATIENT
Start: 2022-01-26 | End: 2022-01-26 | Stop reason: HOSPADM

## 2022-01-26 RX ORDER — SODIUM CHLORIDE 9 MG/ML
INJECTION, SOLUTION INTRAVENOUS CONTINUOUS PRN
Status: DISCONTINUED | OUTPATIENT
Start: 2022-01-26 | End: 2022-01-26

## 2022-01-26 RX ORDER — BUPIVACAINE HYDROCHLORIDE AND EPINEPHRINE 5; 5 MG/ML; UG/ML
INJECTION, SOLUTION EPIDURAL; INTRACAUDAL; PERINEURAL AS NEEDED
Status: DISCONTINUED | OUTPATIENT
Start: 2022-01-26 | End: 2022-01-26 | Stop reason: HOSPADM

## 2022-01-26 RX ORDER — METHOCARBAMOL 500 MG/1
750 TABLET, FILM COATED ORAL 3 TIMES DAILY
Qty: 30 TABLET | Refills: 0 | Status: SHIPPED | OUTPATIENT
Start: 2022-01-26 | End: 2022-02-03 | Stop reason: SDUPTHER

## 2022-01-26 RX ORDER — ACETAMINOPHEN 325 MG/1
975 TABLET ORAL ONCE
Status: COMPLETED | OUTPATIENT
Start: 2022-01-26 | End: 2022-01-26

## 2022-01-26 RX ORDER — GLYCOPYRROLATE 0.2 MG/ML
INJECTION INTRAMUSCULAR; INTRAVENOUS AS NEEDED
Status: DISCONTINUED | OUTPATIENT
Start: 2022-01-26 | End: 2022-01-26

## 2022-01-26 RX ORDER — LIDOCAINE HYDROCHLORIDE AND EPINEPHRINE 10; 10 MG/ML; UG/ML
INJECTION, SOLUTION INFILTRATION; PERINEURAL AS NEEDED
Status: DISCONTINUED | OUTPATIENT
Start: 2022-01-26 | End: 2022-01-26 | Stop reason: HOSPADM

## 2022-01-26 RX ORDER — OXYCODONE HYDROCHLORIDE AND ACETAMINOPHEN 5; 325 MG/1; MG/1
1 TABLET ORAL EVERY 4 HOURS PRN
Status: DISCONTINUED | OUTPATIENT
Start: 2022-01-26 | End: 2022-01-26 | Stop reason: HOSPADM

## 2022-01-26 RX ORDER — FENTANYL CITRATE 50 UG/ML
INJECTION, SOLUTION INTRAMUSCULAR; INTRAVENOUS AS NEEDED
Status: DISCONTINUED | OUTPATIENT
Start: 2022-01-26 | End: 2022-01-26

## 2022-01-26 RX ORDER — PROPOFOL 10 MG/ML
INJECTION, EMULSION INTRAVENOUS CONTINUOUS PRN
Status: DISCONTINUED | OUTPATIENT
Start: 2022-01-26 | End: 2022-01-26

## 2022-01-26 RX ORDER — KETAMINE HYDROCHLORIDE 50 MG/ML
INJECTION, SOLUTION, CONCENTRATE INTRAMUSCULAR; INTRAVENOUS AS NEEDED
Status: DISCONTINUED | OUTPATIENT
Start: 2022-01-26 | End: 2022-01-26

## 2022-01-26 RX ORDER — SUCCINYLCHOLINE/SOD CL,ISO/PF 100 MG/5ML
SYRINGE (ML) INTRAVENOUS AS NEEDED
Status: DISCONTINUED | OUTPATIENT
Start: 2022-01-26 | End: 2022-01-26

## 2022-01-26 RX ORDER — DEXAMETHASONE SODIUM PHOSPHATE 10 MG/ML
INJECTION, SOLUTION INTRAMUSCULAR; INTRAVENOUS AS NEEDED
Status: DISCONTINUED | OUTPATIENT
Start: 2022-01-26 | End: 2022-01-26

## 2022-01-26 RX ORDER — PROPOFOL 10 MG/ML
INJECTION, EMULSION INTRAVENOUS AS NEEDED
Status: DISCONTINUED | OUTPATIENT
Start: 2022-01-26 | End: 2022-01-26

## 2022-01-26 RX ORDER — CEPHALEXIN 500 MG/1
500 CAPSULE ORAL EVERY 8 HOURS SCHEDULED
Qty: 21 CAPSULE | Refills: 0 | Status: SHIPPED | OUTPATIENT
Start: 2022-01-26 | End: 2022-02-02

## 2022-01-26 RX ORDER — SENNOSIDES 8.6 MG
650 CAPSULE ORAL EVERY 8 HOURS PRN
Qty: 30 TABLET | Refills: 0 | Status: SHIPPED | OUTPATIENT
Start: 2022-01-26

## 2022-01-26 RX ORDER — ONDANSETRON 2 MG/ML
4 INJECTION INTRAMUSCULAR; INTRAVENOUS ONCE AS NEEDED
Status: DISCONTINUED | OUTPATIENT
Start: 2022-01-26 | End: 2022-01-26 | Stop reason: HOSPADM

## 2022-01-26 RX ADMIN — SODIUM CHLORIDE: 0.9 INJECTION, SOLUTION INTRAVENOUS at 11:28

## 2022-01-26 RX ADMIN — CEFAZOLIN SODIUM 2000 MG: 2 SOLUTION INTRAVENOUS at 14:30

## 2022-01-26 RX ADMIN — FENTANYL CITRATE 100 MCG: 50 INJECTION, SOLUTION INTRAMUSCULAR; INTRAVENOUS at 11:13

## 2022-01-26 RX ADMIN — GLYCOPYRROLATE 0.2 MG: 0.2 INJECTION, SOLUTION INTRAMUSCULAR; INTRAVENOUS at 11:32

## 2022-01-26 RX ADMIN — DEXAMETHASONE SODIUM PHOSPHATE 2 MG: 10 INJECTION, SOLUTION INTRAMUSCULAR; INTRAVENOUS at 11:51

## 2022-01-26 RX ADMIN — SODIUM CHLORIDE, SODIUM LACTATE, POTASSIUM CHLORIDE, AND CALCIUM CHLORIDE: .6; .31; .03; .02 INJECTION, SOLUTION INTRAVENOUS at 11:43

## 2022-01-26 RX ADMIN — FENTANYL CITRATE 50 MCG: 50 INJECTION, SOLUTION INTRAMUSCULAR; INTRAVENOUS at 15:38

## 2022-01-26 RX ADMIN — GLYCOPYRROLATE 0.2 MG: 0.2 INJECTION, SOLUTION INTRAMUSCULAR; INTRAVENOUS at 11:29

## 2022-01-26 RX ADMIN — ONDANSETRON 4 MG: 2 INJECTION INTRAMUSCULAR; INTRAVENOUS at 14:45

## 2022-01-26 RX ADMIN — PHENYLEPHRINE HYDROCHLORIDE 30 MCG/MIN: 10 INJECTION INTRAVENOUS at 11:29

## 2022-01-26 RX ADMIN — LIDOCAINE HYDROCHLORIDE 50 MG: 10 INJECTION, SOLUTION EPIDURAL; INFILTRATION; INTRACAUDAL at 11:13

## 2022-01-26 RX ADMIN — OXYCODONE HYDROCHLORIDE AND ACETAMINOPHEN 1 TABLET: 5; 325 TABLET ORAL at 17:39

## 2022-01-26 RX ADMIN — DEXMEDETOMIDINE HYDROCHLORIDE 0.3 MCG/KG/HR: 100 INJECTION, SOLUTION INTRAVENOUS at 11:20

## 2022-01-26 RX ADMIN — ACETAMINOPHEN 975 MG: 325 TABLET, FILM COATED ORAL at 10:00

## 2022-01-26 RX ADMIN — CEFAZOLIN SODIUM 2000 MG: 2 SOLUTION INTRAVENOUS at 11:22

## 2022-01-26 RX ADMIN — PROPOFOL 100 MCG/KG/MIN: 10 INJECTION, EMULSION INTRAVENOUS at 11:20

## 2022-01-26 RX ADMIN — MIDAZOLAM HYDROCHLORIDE 2 MG: 1 INJECTION, SOLUTION INTRAMUSCULAR; INTRAVENOUS at 11:10

## 2022-01-26 RX ADMIN — SODIUM CHLORIDE, SODIUM LACTATE, POTASSIUM CHLORIDE, AND CALCIUM CHLORIDE: .6; .31; .03; .02 INJECTION, SOLUTION INTRAVENOUS at 09:49

## 2022-01-26 RX ADMIN — DEXAMETHASONE SODIUM PHOSPHATE 8 MG: 10 INJECTION, SOLUTION INTRAMUSCULAR; INTRAVENOUS at 11:13

## 2022-01-26 RX ADMIN — FENTANYL CITRATE 50 MCG: 50 INJECTION, SOLUTION INTRAMUSCULAR; INTRAVENOUS at 15:44

## 2022-01-26 RX ADMIN — KETAMINE HYDROCHLORIDE 30 MG: 50 INJECTION INTRAMUSCULAR; INTRAVENOUS at 11:13

## 2022-01-26 RX ADMIN — PROPOFOL 150 MG: 10 INJECTION, EMULSION INTRAVENOUS at 11:13

## 2022-01-26 RX ADMIN — REMIFENTANIL HYDROCHLORIDE 0.2 MCG/KG/MIN: 1 INJECTION, POWDER, LYOPHILIZED, FOR SOLUTION INTRAVENOUS at 11:20

## 2022-01-26 RX ADMIN — Medication 150 MG: at 11:13

## 2022-01-26 RX ADMIN — KETAMINE HYDROCHLORIDE 20 MG: 50 INJECTION INTRAMUSCULAR; INTRAVENOUS at 12:33

## 2022-01-26 NOTE — DISCHARGE INSTRUCTIONS
Discharge instructions  Anterior cervical decompression and fixation/fusion      Surgical incisional care:   Keep incision clean and dry  Avoid applying creams, lotion or antiseptic to incision area   Allow steri-strips to fall off  If still in place at two week postoperative visit, we will remove them   Check the wound daily  If the incision becomes red, swollen, tender, warm, or has increased drainage please notify physician immediately   May shower 3 days after surgery, but do not soak in a tub and no swimming  o Use mild antimicrobial soap and water with a clean washcloth  Pat incision dry after showering and a clean towel daily   Cervical VISTA collar to be worn at all times except for showering  Change from VISTA (grey) collar to the Sheyla Machelle (peach) collar prior to showering  Incision may be cleaned with water and a mild antimicrobial soap using a clean washcloth  Incision is to be gently patted dry with a clean towel  Once dry, collar should be changed back to a VISTA (grey) collar with clean pads in place   Wash collar pads with mild soap and water  They are to be laid flat to dry on a clean towel  Recommend changing every 1-2 days   Please refer to VISTA collar instructions for further details  Activity Restrictions:   No heavy lifting greater than 5 - 10lbs  No strenuous activities   May walk as tolerated  Encourage at least 4 short walks per day   No driving while requiring cervical collar, anticipated six weeks   No significant neck movement   Diet: consider soft minced food with gravy  Recommend small bites with sips of water between  Postoperative medication:   Take pain medications to relieve incision pain, and muscle relaxants to prevent spasms as directed  Please see after visit summary (AVS) for details   Take over the counter stool softeners such as colace or senna-s to avoid constipation while on narcotics  Intake water and fiber intake      Do not take ibuprofen, Naproxen/Aleve or any NSAID until cleared by surgeon  May take Tylenol instead   If taking Coumadin, Aspirin, or Plavix, you may resume these medications when cleared by Neurosurgery  Follow-up as scheduled for a 2 week incision check  Follow-up 6 weeks after surgery with a repeat cervical spine upright x-rays to be completed prior to visit  **Please notify MD immediately if you experience a fever of 101F, have increased neck or arm pain, new numbness and/or weakness in your arms/hands, difficulty swallowing or breathing especially while lying down, numbness or weakness in your legs  **

## 2022-01-26 NOTE — ANESTHESIA POSTPROCEDURE EVALUATION
Post-Op Assessment Note    CV Status:  Stable  Pain Score: 5    Pain management: adequate     Mental Status:  Alert and awake   Hydration Status:  Euvolemic   PONV Controlled:  Controlled   Airway Patency:  Patent      Post Op Vitals Reviewed: Yes      Staff: CRNA, Anesthesiologist         No complications documented      BP   131/73   Temp  97 8   Pulse  89   Resp   16   SpO2   98

## 2022-01-26 NOTE — ANESTHESIA PROCEDURE NOTES
Arterial Line Insertion  Performed by: Micah Troy CRNA  Authorized by: Toby May MD   Consent: Written consent obtained  Risks and benefits: risks, benefits and alternatives were discussed  Consent given by: patient  Patient understanding: patient states understanding of the procedure being performed  Patient consent: the patient's understanding of the procedure matches consent given  Procedure consent: procedure consent matches procedure scheduled  Relevant documents: relevant documents present and verified  Test results: test results not available  Radiology Images: No Radiology Images displayed or report reviewed  Required items: required blood products, implants, devices, and special equipment available  Patient identity confirmed: arm band, provided demographic data and hospital-assigned identification number  Time out: Immediately prior to procedure a "time out" was called to verify the correct patient, procedure, equipment, support staff and site/side marked as required  Preparation: Patient was prepped and draped in the usual sterile fashion    Indications: multiple ABGs and hemodynamic monitoring  Orientation:  Right  Location: radial artery  Procedure Details:  Meliton's test normal: yes  Needle gauge: 20  Seldinger technique: Seldinger technique used  Number of attempts: 1    Post-procedure:  Post-procedure: dressing applied  Waveform: good waveform  Post-procedure CNS: normal  Patient tolerance: patient tolerated the procedure well with no immediate complications  Comments: Ultrasound guided

## 2022-01-26 NOTE — OP NOTE
PERATIVE REPORT  PATIENT NAME: Madelin Gil    :  1975  MRN: [id number]  Pt Location: [facility]    SURGERY DATE: 2022    Surgeon(s) and Role:     * Chele Graham MD - Primary    Preop Diagnosis:  Spinal cord compression ([address] ) [G95 20]  Cervical myelopathy    Procedure(s) (LRB):  C3/4 ACDF    Specimen(s):  None    Estimated Blood Loss:   15 cc    Drains:  None    Anesthesia Type:   General    Operative Indications:  Spinal cord compression ([address] ) [G95 20]    Operative Findings:  Adequate decompression of central canal and foramen at C3/4  Satisfactory location and trajectory of instrumentation confirmed with XR    Complications:   None    Operative Findings:  Adequate central decompression and instrumentation at C3/C4 confirmed with intraoperative XR  No sustained or significant changes in neuromonitoring     Procedure and Technique:  Prior to induction of anesthesia, an audible huddle was performed confirming site of operation, planned operation, need for antibiotics, and other anticipated needs with the patient, surgical, nursing, and anesthesia teams  All agreed to proceed  Patient was positioned  All pressure points were verified to be padded appropriately  Prior to starting the operation the surgical team paused and performed an audible timeout  The surgical, nursing, and anesthesia personnel agreed with the procedure to be performed  The surgical region was prepped and draped in the usual sterile fashion  Right-sided anterior cervical incision overlying C3/4 disc level was opened sharply with a #10 scalpel down to the level of the underlying platysma  The skin edges were undermined and the platysma muscle was divided in the direction of its fibers  The groove between the trachea and esophagus and the carotid sheath was followed down to the anterior cervical spine and intraoperative x-ray localized the level   The longus colli muscles were undermined and self-retaining retractors were placed without difficulty  The annulus was divided  Alex posts were placed in the vertebral bodies above and below the disc space of C3/4  The disc space was distracted  The microscope was brought into the field  The disc material was removed with pituitary rongeurs and curettes  The posterior longitudinal ligament was then removed with a blunt nerve hook and Kerrison rongeurs  The spinal canal and neural foramen were decompressed using Kerrison rongeurs  Once the decompression was complete, the wound was copiously irrigated  The endplates of the vertebral bodies were prepared using a high speed air drill  A PEEK cage of appropriate size was tamped into place under distraction to provide optimal arthrodesis  Once this was complete, an MRI-compatible anterior cervical titanium plate with 13 mm screws were placed into the vertebral body above and below  Intra-operative x-ray demonstrated satisfactory locations and trajectories of all instrumentation  The following products were utilized:   Implant Name Inv  Item Serial No   Lot No  LRB No  Used   CANDI PLUS DBM 1ML - [id number] CANDI PLUS DBM 1ML [id number] [employer]  Bilateral 1   SPACER ANATOMIC PEEK 6 X 14 X 11MM - [id number] SPACER ANATOMIC PEEK 6 X 14 X 11MM  [employer] [id number] Bilateral 1   PLATE ZEVO 19 MM 1 LVL - [id number] PLATE ZEVO 19 MM 1 LVL  [employer]  Bilateral 1   SCREW ZEVO 3 5 X 13 MM SLF DRL - [id number] SCREW ZEVO 3 5 X 13 MM SLF DRL  [employer]  Bilateral 4       Prior to closure, hemostasis was achieved  The wound was copiously irrigated  All retracting devices were removed from the wound  All temporary implants were removed from the wound  Prior to closure, surgical count was correct and verified x 1  The wound was closed in the usual three-layer fashion  Fascia, deep dermal, and cutaneous sutures were placed  The wound was then dressed      At the end of the case, a sign out was performed whereby the anesthesia, surgical, and nursing teams re-confirmed the operation performed, any blood products to be distributed, specimens to be sent, or equipment issues  All parties agreed  Complications:   None    I was present for the entire procedure, and a qualified resident physician was not available      Patient Disposition:  PACU then home in stable condition

## 2022-01-26 NOTE — ANESTHESIA PREPROCEDURE EVALUATION
Procedure:  C3/4 Anterior Cervical Discectomy and Fusion (neuromonitoring) (Bilateral Spine Cervical)    Relevant Problems   CARDIO   (+) Essential hypertension   (+) Hyperlipidemia      ENDO   (+) Controlled type 2 diabetes mellitus without complication, without long-term current use of insulin (HCC)      NEURO/PSYCH   (+) Chronic pain syndrome      PULMONARY   (+) Moderate persistent asthma without complication   (+) Sleep apnea      Other   (+) Cervical disc disorder with radiculopathy of mid-cervical region   (+) Status post colon resection        Physical Exam    Airway    Mallampati score: I  TM Distance: >3 FB  Neck ROM: full     Dental   No notable dental hx     Cardiovascular      Pulmonary      Other Findings        Anesthesia Plan  ASA Score- 3     Anesthesia Type- general with ASA Monitors  Additional Monitors: arterial line  Airway Plan: ETT  Plan Factors-Exercise tolerance (METS): >4 METS  Chart reviewed  Existing labs reviewed  Patient summary reviewed  Patient is not a current smoker  Obstructive sleep apnea risk education given perioperatively  Induction- intravenous  Postoperative Plan- Plan for postoperative opioid use  Planned trial extubation    Informed Consent- Anesthetic plan and risks discussed with patient  I personally reviewed this patient with the CRNA  Discussed and agreed on the Anesthesia Plan with the CRNA  Kinsey Bird

## 2022-01-27 ENCOUNTER — TELEPHONE (OUTPATIENT)
Dept: NEUROSURGERY | Facility: CLINIC | Age: 47
End: 2022-01-27

## 2022-01-27 NOTE — TELEPHONE ENCOUNTER
Patient called the office and left a voice message requesting for a call back  Returned the call to the patient  He did not answer  Left a voice message requesting for a call back  Provided the office's phone number

## 2022-01-28 NOTE — TELEPHONE ENCOUNTER
Called patient to see how he is doing after surgery  Patient reports he is doing well overall and denies any incisional issues or fevers  Admits to taking oxycodone more frequently than prescribed at approx  every 4 hours rather than 6  Advised that it is important that he take as prescribed as it can not be refilled early but also can be dangerous to take too much of this as is can cause respiratory suppression and death  Directed him to take every 6 hours and altenrate with 1000mg acetaminophen every 8 hours and methocarbamol as prescribed  He stated and understanding  He is able to ambulate around the house and complete ADLs  Educated the patient about the importance of preventing blood clots and provided measures how to prevent them  Patient has moved his bowels since the surgery  Encouraged patient to take an over the counter stool softener, if he is taking narcotic pain medication  Encouraged fiber intake and fluids  Reviewed incision care with the patient  Advised that after three days he may take a shower and gently wash the surgical site with soap and water  Use clean wash cloth, towels, and clothing  Do not submerge in water until cleared by the surgeon  Do not apply any creams, ointments, or lotions to the site  Patient is aware to call the office if any redness, swelling, drainage, dehiscence of incision, or fever >100 F occurs  Patient is aware to call the office if any concerns or questions may arise  Reminded patient of his upcoming appointments with the date/time/location  Patient was appreciative for the call

## 2022-02-03 ENCOUNTER — DOCUMENTATION (OUTPATIENT)
Dept: NEUROSURGERY | Facility: CLINIC | Age: 47
End: 2022-02-03

## 2022-02-03 DIAGNOSIS — Z98.890 POST-OPERATIVE STATE: ICD-10-CM

## 2022-02-03 DIAGNOSIS — G89.18 POST-OP PAIN: Primary | ICD-10-CM

## 2022-02-03 DIAGNOSIS — Z98.1 S/P CERVICAL SPINAL FUSION: ICD-10-CM

## 2022-02-03 RX ORDER — OXYCODONE HYDROCHLORIDE 5 MG/1
5 TABLET ORAL EVERY 8 HOURS PRN
Qty: 21 TABLET | Refills: 0 | Status: SHIPPED | OUTPATIENT
Start: 2022-02-03 | End: 2022-02-10

## 2022-02-03 RX ORDER — METHOCARBAMOL 750 MG/1
750 TABLET, FILM COATED ORAL EVERY 6 HOURS PRN
Qty: 40 TABLET | Refills: 0 | Status: SHIPPED | OUTPATIENT
Start: 2022-02-03 | End: 2022-07-01 | Stop reason: SDUPTHER

## 2022-02-03 NOTE — TELEPHONE ENCOUNTER
Contacted Gonzalo back to advise of response from provider  Placed refills as directed  He will purchase acetaminophen OTC  He was appreciative of the assistance

## 2022-02-03 NOTE — TELEPHONE ENCOUNTER
Renew oxycodone 1 tab every 8 hours x  21 doses , 1 week ,   methocarbamol 750 mg po every 6 hours x 10 days  Acetaminophen 650 mg every 8 hours prn indefinite

## 2022-02-03 NOTE — TELEPHONE ENCOUNTER
Received a call from [facility] requesting refill of post op pain medication  He has none remaining  He is s/p C3/4 Anterior Cervical Discectomy and Fusion (neuromonitoring) (Bilateral Spine Cervical) on 1/26/2022  [facility] reports pain is primarily in her neck at the area of the incision  The pain has affected his ability to sleep  Continues to experience globus pharyngis which he finds particularly bothersome  Directed him to drink cold liquids, eat soft diet until this improves more  Was prescribed :    oxycodone 5mg 1 tab q6h prn #30   methocarbamol 500mg 1 5 tab 3x daily #30  acetaminophen 650mg 1 tab qh8 prn #30    PDMP reviewed and attached to chart separately       Will route a message to provider for refill request

## 2022-02-09 ENCOUNTER — OFFICE VISIT (OUTPATIENT)
Dept: NEUROSURGERY | Facility: CLINIC | Age: 47
End: 2022-02-09

## 2022-02-09 VITALS
RESPIRATION RATE: 18 BRPM | SYSTOLIC BLOOD PRESSURE: 134 MMHG | HEIGHT: 66 IN | DIASTOLIC BLOOD PRESSURE: 82 MMHG | WEIGHT: 210 LBS | TEMPERATURE: 98.4 F | HEART RATE: 77 BPM | BODY MASS INDEX: 33.75 KG/M2

## 2022-02-09 DIAGNOSIS — Z98.1 S/P CERVICAL SPINAL FUSION: ICD-10-CM

## 2022-02-09 DIAGNOSIS — G95.20 CERVICAL CORD COMPRESSION WITH MYELOPATHY (HCC): Primary | ICD-10-CM

## 2022-02-09 PROCEDURE — 99024 POSTOP FOLLOW-UP VISIT: CPT | Performed by: PHYSICIAN ASSISTANT

## 2022-02-09 NOTE — ASSESSMENT & PLAN NOTE
Patient presents today for two week postoperative visit   Status post anterior cervical diskectomy and fusion C3-4 by Dr Tami Mclaughlin on January 26, 2022  Sima Nine Preoperatively patient had a 5-6 month history of progressive worsening bilateral upper extremity paresthesias which progressed to numbness, loss of fine motor function in his hands and gait imbalance  Plan:    Continue monitor neurological symptoms   Steri-Strips removed  Incision healing well   Continue activity restrictions including no driving while maintaining collar   Patient may shower regularly   Ongoing pain controlled multimodal pain regimen as needed  Patient states he does not need refills of any medications at this time   New cervical pads provided  Education on collar fitting provided   Intraoperative x-rays reviewed   Follow-up as scheduled for six week postoperative visit with repeat cervical spine x-rays to be completed prior to  X-ray order placed   All questions were answered  Patient aware to call the office with any additional questions or concerns

## 2022-02-09 NOTE — PROGRESS NOTES
Neurosurgery Office Note  Ismael Day 42 y o  male MRN: [id number]      Assessment/Plan     Cervical cord compression with myelopathy [facility])  Patient presents today for two week postoperative visit   Status post anterior cervical diskectomy and fusion C3-4 by Dr Humberto Pennington on January 26, 2022  Esther Gonzalez Preoperatively patient had a 5-6 month history of progressive worsening bilateral upper extremity paresthesias which progressed to numbness, loss of fine motor function in his hands and gait imbalance  Plan:    Continue monitor neurological symptoms   Steri-Strips removed  Incision healing well   Continue activity restrictions including no driving while maintaining collar   Patient may shower regularly   Ongoing pain controlled multimodal pain regimen as needed  Patient states he does not need refills of any medications at this time   New cervical pads provided  Education on collar fitting provided   Intraoperative x-rays reviewed   Follow-up as scheduled for six week postoperative visit with repeat cervical spine x-rays to be completed prior to  X-ray order placed   All questions were answered  Patient aware to call the office with any additional questions or concerns  Diagnoses and all orders for this visit:    Cervical cord compression with myelopathy (HCC)  -     XR spine cervical 2 or 3 vw injury; Future    S/P cervical spinal fusion  -     XR spine cervical 2 or 3 vw injury; Future            CHIEF COMPLAINT    Chief Complaint   Patient presents with    Post-op       HISTORY    History of Present Illness     This is a 74-year-old male past medical history significant for hypertension and hyperlipidemia who presents today for a two week postoperative visit status post anterior cervical diskectomy fixation and fusion C3-4  Prior to surgery patient suffered from progressive myelopathy including numbness of arms, gait disturbance on fine motor function in his hands    Today patient states his symptoms have significantly improved approximately 80 85% regarding the numbness  He has very mild numbness in the tip of his left index finger  Patient states his walking has significantly improved along with the function of his hands  Postoperatively patient did have some difficulty swallowing and was tolerating soft foods  Approximately three days ago he had significant improvement and is now tolerating regular foods  In addition he endorses difficulty sleeping related to throat pain and collar which has also improved over the last several days  Regarding pain control, patient takes oxycodone 5 mg in the morning and in the evening along with Robaxin approximately 3 times a day  He is maintained on Lyrica 75 mg b i d  which was started approximately 2-3 weeks preoperatively  He denies any incisional concerns  REVIEW OF SYSTEMS    Review of Systems   Constitutional: Negative  HENT: Negative  Eyes: Negative  Respiratory: Negative  Cardiovascular: Negative  Gastrointestinal: Negative  Endocrine: Negative  Genitourinary: Negative  Musculoskeletal: Negative for neck pain (Patient states he received 75% relief from his procedure)  Skin: Negative  Allergic/Immunologic: Negative  Neurological: Negative  Hematological: Negative  Psychiatric/Behavioral: Negative  All other systems reviewed and are negative  ROS obtained by MA    Meds/Allergies     Current Outpatient Medications   Medication Sig Dispense Refill    albuterol (PROVENTIL HFA,VENTOLIN HFA) 90 mcg/act inhaler Inhale 2 puffs every 6 (six) hours as needed for wheezing or shortness of breath 1 Inhaler 0    budesonide-formoterol (SYMBICORT) 80-4 5 MCG/ACT inhaler Inhale 2 puffs 2 (two) times a day Rinse mouth after use   1 Inhaler 2    Cholecalciferol (VITAMIN D) 2000 units CAPS Take 1 capsule by mouth daily      docusate sodium (COLACE) 100 mg capsule Take 1 capsule (100 mg total) by mouth 2 (two) times a day 20 capsule 0    losartan (COZAAR) 50 mg tablet Take 1 tablet (50 mg total) by mouth daily 90 tablet 1    methocarbamol (ROBAXIN) 750 mg tablet Take 1 tablet (750 mg total) by mouth every 6 (six) hours as needed for muscle spasms 40 tablet 0    Multiple Vitamins-Minerals (multivitamin with minerals) tablet Take 1 tablet by mouth daily      oxyCODONE (Roxicodone) 5 immediate release tablet Take 1 tablet (5 mg total) by mouth every 8 (eight) hours as needed for severe pain for up to 7 days Max Daily Amount: 15 mg 21 tablet 0    pregabalin (LYRICA) 75 mg capsule Take 1 capsule (75 mg total) by mouth 2 (two) times a day 60 capsule 1    rosuvastatin (CRESTOR) 5 mg tablet Take 1 tablet (5 mg total) by mouth daily 90 tablet 1    Ustekinumab (STELARA SC) Inject under the skin every 3 (three) months       acetaminophen (TYLENOL) 650 mg CR tablet Take 1 tablet (650 mg total) by mouth every 8 (eight) hours as needed for mild pain 30 tablet 0    Hydrocortisone Butyrate 0 1 % CREA as needed   3    metFORMIN (GLUCOPHAGE) 500 mg tablet Take 1 tablet (500 mg total) by mouth daily with breakfast (Patient not taking: Reported on 2/9/2022 ) 90 tablet 1    methylPREDNISolone 4 MG tablet therapy pack Use as directed on package 21 each 0     No current facility-administered medications for this visit  No Known Allergies    PAST HISTORY    Past Medical History:   Diagnosis Date    Diverticulitis     Hyperlipemia     Hypertension     Prediabetes     Psoriasis        Past Surgical History:   Procedure Laterality Date    ABDOMINAL SURGERY      COLONOSCOPY      TN ARTHRODESIS ANT INTERBODY INC DISCECTOMY, CERVICAL BELOW C2 Bilateral 1/26/2022    Procedure: C3/4 Anterior Cervical Discectomy and Fusion (neuromonitoring);   Surgeon: Alyce Najera MD;  [facility];  Service: Neurosurgery    TN COLONOSCOPY FLX DX W/COLLJ [facility] WHEN PFRMD N/A 4/3/2019    Procedure: COLONOSCOPY;  Surgeon: Ryan Lee MD; Location: [facility]  GI LAB; Service: Colorectal    AL LAP,SURG,COLECTOMY, PARTIAL, W/ANAST N/A 1/8/2020    Procedure: RESECTION COLON SIGMOID LAPAROSCOPIC, LAPAROSCOPIC SPLENIC FLEXURE MOBILIZATION;  Surgeon: Angel Steen MD;  [facility];  Service: Colorectal    AL SIGMOIDOSCOPY FLX DX W/COLLJ SPEC BR/WA IF PFRMD N/A 1/8/2020    Procedure: Komal Baldwin;  Surgeon: Angel Steen MD;  [facility];  Service: Colorectal    TENDON REPAIR      right elbow       Social History     Tobacco Use    Smoking status: Former Smoker     Packs/day: 2 00     Years: 30 00     Pack years: 60 00    Smokeless tobacco: Current User     Types: Chew    Tobacco comment: Quit 20 months ago   Vaping Use    Vaping Use: Never used   Substance Use Topics    Alcohol use: Yes     Comment: occasional alcohol use    Drug use: Yes     Types: Marijuana     Comment: every couple weeks       Family History   Problem Relation Age of Onset    Diabetes Maternal Grandmother     Diabetes Paternal Grandfather     Cancer Father     Colon cancer Neg Hx          Above history personally reviewed  EXAM    Vitals:Blood pressure 134/82, pulse 77, temperature 98 4 °F (36 9 °C), temperature source Temporal, resp  rate 18, height 5' 6" (1 676 m), weight 95 3 kg (210 lb)  ,Body mass index is 33 89 kg/m²  Physical Exam  Constitutional:       General: He is not in acute distress  Appearance: Normal appearance  He is well-developed  HENT:      Head: Normocephalic and atraumatic  Eyes:      General: No scleral icterus  Right eye: No discharge  Left eye: No discharge  Conjunctiva/sclera: Conjunctivae normal    Neck:      Comments: VISTA collar in place  Cardiovascular:      Rate and Rhythm: Normal rate  Pulmonary:      Effort: Pulmonary effort is normal  No respiratory distress  Skin:     General: Skin is warm and dry        Comments: See photo   Neurological:      Mental Status: He is alert  Gait: Gait is intact  Deep Tendon Reflexes: Strength normal    Psychiatric:         Mood and Affect: Mood normal          Speech: Speech normal          Behavior: Behavior normal          Thought Content: Thought content normal          Judgment: Judgment normal          Neurologic Exam     Mental Status   Follows 3 step commands  Attention: normal  Concentration: normal    Speech: speech is normal   Level of consciousness: alert  Knowledge: good  Normal comprehension  Cranial Nerves     CN III, IV, VI   Conjugate gaze: present    CN VIII   Hearing: intact    Motor Exam   Muscle bulk: normal  Overall muscle tone: normal    Strength   Strength 5/5 throughout  Sensory Exam   Light touch normal    Left arm light touch: except tip of index finger  Gait, Coordination, and Reflexes     Gait  Gait: normal    Tremor   Resting tremor: absent  Intention tremor: absent  Action tremor: absent    Reflexes   Right Sahu: present  Left Sahu: present  Right ankle clonus: present  Left ankle clonus: presentRAM slightly slower on right         MEDICAL DECISION MAKING    Imaging Studies:     XR spine cervical 2 or 3 vw injury    Result Date: 1/27/2022  Narrative: C-ARM - cervical spine INDICATION: Spinal cord compression ([address] ) [G95 20]  Procedure guidance  COMPARISON:  None TECHNIQUE: FLUOROSCOPY TIME:   34 SECONDS 10 FLUOROSCOPIC IMAGES FINDINGS: Fluoroscopic guidance provided for procedure guidance  Localization at C3-C4 was provided to [address] [place] [address] operating room on 1/26/2022 at 12:45 PM   Final fluoroscopic image shows anterior cervical discectomy and fusion at C3-C4  Osseous and soft tissue detail limited by technique  Impression: Fluoroscopic guidance provided for procedure guidance  Please refer to the separate procedure notes for additional details  Workstation performed: [id number]       I have personally reviewed pertinent reports     and I have personally reviewed pertinent films in PACS

## 2022-02-09 NOTE — PATIENT INSTRUCTIONS
Discharge instructions  Anterior cervical decompression and fixation/fusion      Surgical incisional care:   Keep incision clean and dry  Avoid applying creams, lotion or antiseptic to incision area   Check the wound daily  If the incision becomes red, swollen, tender, warm, or has increased drainage please notify physician immediately   May shower 3 days after surgery, but do not soak in a tub and no swimming  o Use mild antimicrobial soap and water with a clean washcloth  Pat incision dry after showering and a clean towel daily   Cervical [place]IST[place] collar to be worn at all times except for showering  Change from [place] (grey[place] collar to [place] collar prior to showering  Incision may be cleaned with water and a mild antimicrobial soap using a clean washcloth  Incision is to be gently patted dry with a clean towel  Once dry, collar should be changed back to a VISTA (grey) collar with clean pads in place   Wash collar pads with mild soap and water  They are to be laid flat to dry on a clean towel  Recommend changing every 1-2 days   Please refer to VISTA collar instructions for further details  Activity Restrictions:   No heavy lifting greater than 5 - 10lbs  No strenuous activities   May walk as tolerated  Encourage at least 4 short walks per day   No driving while requiring cervical collar, anticipated six weeks   No significant neck movement   Diet: consider soft minced food with gravy  Recommend small bites with sips of water between  Postoperative medication:   Take pain medications to relieve incision pain, and muscle relaxants to prevent spasms as directed  Please see after visit summary (AVS) for details   Take over the counter stool softeners such as colace or senna-s to avoid constipation while on narcotics  Intake water and fiber intake   Do not take ibuprofen, Naproxen/Aleve or any NSAID until cleared by surgeon  May take Tylenol instead     If taking Coumadin, Aspirin, or Plavix, you may resume these medications when cleared by Neurosurgery  Follow-up 6 weeks after surgery with a repeat cervical spine upright x-rays to be completed prior to visit  **Please notify MD immediately if you experience a fever of 101F, have increased neck or arm pain, new numbness and/or weakness in your arms/hands, difficulty swallowing or breathing especially while lying down, numbness or weakness in your legs  **

## 2022-03-07 ENCOUNTER — APPOINTMENT (OUTPATIENT)
Dept: RADIOLOGY | Facility: CLINIC | Age: 47
End: 2022-03-07
Payer: COMMERCIAL

## 2022-03-07 DIAGNOSIS — Z98.1 S/P CERVICAL SPINAL FUSION: ICD-10-CM

## 2022-03-07 DIAGNOSIS — G95.20 CERVICAL CORD COMPRESSION WITH MYELOPATHY (HCC): ICD-10-CM

## 2022-03-07 PROCEDURE — 72040 X-RAY EXAM NECK SPINE 2-3 VW: CPT

## 2022-03-10 ENCOUNTER — OFFICE VISIT (OUTPATIENT)
Dept: NEUROSURGERY | Facility: CLINIC | Age: 47
End: 2022-03-10

## 2022-03-10 VITALS
SYSTOLIC BLOOD PRESSURE: 132 MMHG | DIASTOLIC BLOOD PRESSURE: 60 MMHG | HEIGHT: 66 IN | BODY MASS INDEX: 33.75 KG/M2 | HEART RATE: 97 BPM | RESPIRATION RATE: 17 BRPM | TEMPERATURE: 98.7 F | WEIGHT: 210 LBS

## 2022-03-10 DIAGNOSIS — M48.02 CERVICAL STENOSIS OF SPINE: Primary | ICD-10-CM

## 2022-03-10 PROCEDURE — 99024 POSTOP FOLLOW-UP VISIT: CPT | Performed by: NEUROLOGICAL SURGERY

## 2022-03-10 NOTE — PROGRESS NOTES
Neurosurgery Office Note  Jenelle Mckeon 42 y o  male MRN: [id number]      Assessment/Plan        Problem List Items Addressed This Visit     None      Visit Diagnoses     Cervical stenosis of spine    -  Primary    Relevant Orders    Ambulatory referral to Physiatry    Ambulatory referral to Physical Therapy            Discussion:  Patient is a 57-year-old male with h/o HLD, DM (HbA1C 5 7 on 8/13/21), HTN, diverticulitis, psoriasis, recent right elbow tendon surgery on 12/8/21 (wound healed, focal pain at elbow resolved) who p/w 5-6 months of progressively worsening BUE paresthesias which progressed to numbness diffusely in all fingertips for the past 2 months, loss of fine motor coordination in bilateral hands (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), and worsening gait imbalance s/p C3/4 ACDF on 1/26/22, here for 6-week POV  Today, he reports improved preoperative symptoms overall but still has intermittent paresthesias in left thumb region (feels hot, not numbness)  Denies new symptoms including numbness, weakness, fine motor dysfunction, gait imbalance, trauma, difficulty swallowing, voice changes, bowel/bladder incontinence  Incision is healing well without erythema, edema, dehiscence, drainage, or underlying fluid collection  XR on 3/7 showed intact, stable, adequate instrumentation s/p C3/4 ACDF  At this time, I will clear his rigid cervical collar and allow gradual return to ADLs, without significant strain  I ordered PT for neck stiffness (expected after being in a rigid cervical collar for 6 weeks) and physiatry referral for work clearance  I advised him to still take precautions with heavy lifting (more than 25 lbs) and excessive movements of the neck while returning to activities and work  Continue routine postoperative follow-up, next for 3-month POV  All questions and concerns were addressed during this visit        CHIEF COMPLAINT    Chief Complaint   Patient presents with   Sourav Yates Post-op     6 WEEK POST OP- ACDF       HISTORY    History of Present Illness     42y o  year old male     HPI    See Discussion    REVIEW OF SYSTEMS    Review of Systems   Constitutional: Negative  HENT: Negative  Eyes: Negative  Respiratory: Negative  Cardiovascular: Negative  Gastrointestinal: Negative  Endocrine: Negative  Genitourinary: Negative  Musculoskeletal: Positive for neck pain (soreness) and neck stiffness  Skin: Negative  Allergic/Immunologic: Negative  Neurological: Positive for numbness (bilateral top of wrist prickly feeling x 4 days)  Hematological: Negative  Psychiatric/Behavioral: Negative  All other systems reviewed and are negative  Meds/Allergies     Current Outpatient Medications   Medication Sig Dispense Refill    acetaminophen (TYLENOL) 650 mg CR tablet Take 1 tablet (650 mg total) by mouth every 8 (eight) hours as needed for mild pain 30 tablet 0    albuterol (PROVENTIL HFA,VENTOLIN HFA) 90 mcg/act inhaler Inhale 2 puffs every 6 (six) hours as needed for wheezing or shortness of breath 1 Inhaler 0    budesonide-formoterol (SYMBICORT) 80-4 5 MCG/ACT inhaler Inhale 2 puffs 2 (two) times a day Rinse mouth after use   1 Inhaler 2    Cholecalciferol (VITAMIN D) 2000 units CAPS Take 1 capsule by mouth daily      docusate sodium (COLACE) 100 mg capsule Take 1 capsule (100 mg total) by mouth 2 (two) times a day 20 capsule 0    Hydrocortisone Butyrate 0 1 % CREA as needed   3    losartan (COZAAR) 50 mg tablet Take 1 tablet (50 mg total) by mouth daily 90 tablet 1    metFORMIN (GLUCOPHAGE) 500 mg tablet Take 1 tablet (500 mg total) by mouth daily with breakfast (Patient not taking: Reported on 2/9/2022 ) 90 tablet 1    methocarbamol (ROBAXIN) 750 mg tablet Take 1 tablet (750 mg total) by mouth every 6 (six) hours as needed for muscle spasms 40 tablet 0    methylPREDNISolone 4 MG tablet therapy pack Use as directed on package 21 each 0    Multiple Vitamins-Minerals (multivitamin with minerals) tablet Take 1 tablet by mouth daily      pregabalin (LYRICA) 75 mg capsule Take 1 capsule (75 mg total) by mouth 2 (two) times a day 60 capsule 1    rosuvastatin (CRESTOR) 5 mg tablet Take 1 tablet (5 mg total) by mouth daily 90 tablet 1    Ustekinumab (STELARA SC) Inject under the skin every 3 (three) months        No current facility-administered medications for this visit  No Known Allergies    PAST HISTORY    Past Medical History:   Diagnosis Date    Diverticulitis     Hyperlipemia     Hypertension     Prediabetes     Psoriasis        Past Surgical History:   Procedure Laterality Date    ABDOMINAL SURGERY      COLONOSCOPY      AK ARTHRODESIS ANT INTERBODY INC DISCECTOMY, CERVICAL BELOW C2 Bilateral 1/26/2022    Procedure: C3/4 Anterior Cervical Discectomy and Fusion (neuromonitoring); Surgeon: Ramos Salinas MD;  [facility]: [facility];  Service: Neurosurgery    AK COLONOSCOPY FLX DX W/COLLJ [facility] WHEN PFRMD N/A 4/3/2019    Procedure: COLONOSCOPY;  Surgeon: Micaela Pineda MD;  Location: [facility]  GI [facility];   Service: Colorectal    AK LAP,SURG,COLECTOMY, PARTIAL, W/ANAST N/A 1/8/2020    Procedure: RESECTION COLON SIGMOID LAPAROSCOPIC, LAPAROSCOPIC SPLENIC FLEXURE MOBILIZATION;  Surgeon: Micaela Pineda MD;  [facility];  Service: Colorectal    AK SIGMOIDOSCOPY FLX DX W/COLLJ SPEC BR/WA IF PFRMD N/A 1/8/2020    Procedure: Christophe Pratt;  Surgeon: Micaela Pineda MD;  [facility];  Service: Colorectal    TENDON REPAIR      right elbow       Social History     Tobacco Use    Smoking status: Former Smoker     Packs/day: 2 00     Years: 30 00     Pack years: 60 00    Smokeless tobacco: Current User     Types: Chew    Tobacco comment: Quit 20 months ago   Vaping Use    Vaping Use: Never used   Substance Use Topics    Alcohol use: Yes     Comment: occasional alcohol use    Drug use: Yes     Types: Marijuana     Comment: every couple weeks       Family History   Problem Relation Age of Onset    Diabetes Maternal Grandmother     Diabetes Paternal Grandfather     Cancer Father     Colon cancer Neg Hx          The following portions of the patient's history were reviewed in this encounter and updated as appropriate: Past medical, surgical, family, and social history, as well as medications, allergies, and review of systems  EXAM    Vitals:Blood pressure 132/60, pulse 97, temperature 98 7 °F (37 1 °C), temperature source Temporal, resp  rate 17, height 5' 6" (1 676 m), weight 95 3 kg (210 lb)  ,There is no height or weight on file to calculate BMI  Physical Exam  Vitals and nursing note reviewed  Constitutional:       Appearance: Normal appearance  He is normal weight  HENT:      Head: Normocephalic and atraumatic  Eyes:      Extraocular Movements: Extraocular movements intact and EOM normal       Pupils: Pupils are equal, round, and reactive to light  Cardiovascular:      Rate and Rhythm: Normal rate and regular rhythm  Pulses: Normal pulses  Heart sounds: Normal heart sounds  Pulmonary:      Effort: Pulmonary effort is normal       Breath sounds: Normal breath sounds  Abdominal:      General: Abdomen is flat  Palpations: Abdomen is soft  Musculoskeletal:         General: Normal range of motion  Cervical back: Normal range of motion  Neurological:      General: No focal deficit present  Mental Status: He is alert and oriented to person, place, and time  Mental status is at baseline  GCS: GCS eye subscore is 4  GCS verbal subscore is 5  GCS motor subscore is 6  Cranial Nerves: Cranial nerves are intact  Sensory: Sensation is intact  Motor: Motor function is intact  Coordination: Coordination is intact  Gait: Gait is intact  Deep Tendon Reflexes: Strength normal and reflexes are normal and symmetric     Psychiatric: Mood and Affect: Mood normal          Speech: Speech normal          Behavior: Behavior normal          Neurologic Exam     Mental Status   Oriented to person, place, and time  Attention: normal    Speech: speech is normal   Level of consciousness: alert    Cranial Nerves     CN II   Visual fields full to confrontation  Visual acuity: normal  Right visual field deficit: none  Left visual field deficit: none     CN III, IV, VI   Pupils are equal, round, and reactive to light  Extraocular motions are normal    CN III: no CN III palsy  CN VI: no CN VI palsy  Nystagmus: none   Diplopia: none  Ophthalmoparesis: none  Upgaze: normal  Downgaze: normal  Conjugate gaze: present    CN V   Facial sensation intact  Right facial sensation deficit: none  Left facial sensation deficit: none    CN VII   Facial expression full, symmetric  Right facial weakness: none  Left facial weakness: none    CN VIII   CN VIII normal      CN IX, X   CN IX normal    CN X normal    Palate: symmetric    CN XI   CN XI normal    Right sternocleidomastoid strength: normal  Left sternocleidomastoid strength: normal  Right trapezius strength: normal  Left trapezius strength: normal    CN XII   CN XII normal    Tongue deviation: none    Motor Exam   Muscle bulk: normal  Overall muscle tone: normal  Right arm pronator drift: absent  Left arm pronator drift: absent    Strength   Strength 5/5 throughout  Sensory Exam   Light touch normal      Gait, Coordination, and Reflexes     Gait  Gait: normal    Reflexes   Reflexes 2+ except as noted  Incision healing well      MEDICAL DECISION MAKING    Imaging Studies:     XR spine cervical 2 or 3 vw injury    Result Date: 3/10/2022  Narrative: CERVICAL SPINE INDICATION:   G95 20: Unspecified cord compression Z98 1: Arthrodesis status  COMPARISON:  1/26/2022 VIEWS:  XR SPINE CERVICAL 2 OR 3 VW INJURY Images: 3 FINDINGS: ACDF hardware noted at C3-C4  Intervertebral disc implant    No evidence for hardware complication  Examination performed with a cervical collar  No fracture or subluxation  Alignment is anatomic The intervertebral disc spaces are preserved  The prevertebral soft tissues are within normal limits  Soft tissue calcification on the right at the C3-4 level, possibly carotid  The lung apices are clear  Impression: Status post ACDF, C3-4  No evidence for hardware complication  Possible atherosclerotic calcification in the right carotid  Workstation performed: [id number]       I have personally reviewed pertinent films in [facility] M D    Neurosurgeon

## 2022-03-16 ENCOUNTER — EVALUATION (OUTPATIENT)
Dept: PHYSICAL THERAPY | Facility: MEDICAL CENTER | Age: 47
End: 2022-03-16
Payer: COMMERCIAL

## 2022-03-16 DIAGNOSIS — M48.02 CERVICAL STENOSIS OF SPINE: Primary | ICD-10-CM

## 2022-03-16 PROCEDURE — 97110 THERAPEUTIC EXERCISES: CPT

## 2022-03-16 PROCEDURE — 97161 PT EVAL LOW COMPLEX 20 MIN: CPT

## 2022-03-16 NOTE — PROGRESS NOTES
PT Evaluation     Today's date: 3/16/2022  Patient name: Ismael Day  : 1975  MRN: [id number]  Referring provider: Benja Jenkins MD  Dx:   Encounter Diagnosis     ICD-10-CM    1  Cervical stenosis of spine  M48 02 Ambulatory referral to Physical Therapy       Start Time: 1356  Stop Time: 1445  Total time in clinic (min): 49 minutes    Assessment  Assessment details: Patient is a 42 y o  male reporting to therapy with Cervical stenosis of spine  He had a C3/4 ACDF on 22  He is currently 7 weeks post op and cleared by his doctor to remove his cervical collar and begin gradual return to his ADLs  Upon exam, patient presents with pain and deficits in strength, ROM, endurance, and flexibility which interferes with their ability to perform ADLs  This includes looking over his shoulder and doing activities around the home  Further examination revealed reduced cervical AROM in all directions  The patient was educated improving ROM would occur gradually and he should avoid pushing past end range of motions  Upper trap and subocciptal soft tissue restrictions and soreness were identified which may also be contributing to his reduced motion  Soft tissue restrictions may be due to prolonged time spend in his cervical collar  Upper thoracic spine hypomobility and postural deficits were also identified and likely due to use of the collar  The patient was educated today on their HEP including the proper form of the exercises and the importance of performing them daily in order to manage their symptoms  He was instructed in how to perform the exercises safely to reduce his stiffness  He performed all exercises today without increases in pain or symptoms  He was further reminded of his precautions set by his doctor of excessive motions of the neck and lifting more than 25lbs  They are a good candidate for therapy in order to address their stated deficits and improve their function so they can return to all activities  Positive prognostic factors include reduced neurological symptoms and minimal pain levels  He denies red flag questions of bowel and bladder dysfunction or saddle paraesthesia  From patient's recent neurosurgery visit:  "Today, he reports improved preoperative symptoms overall but still has intermittent paresthesias in left thumb region ("feels hot", not numbness)  Denies new symptoms including numbness, weakness, fine motor dysfunction, gait imbalance, trauma, difficulty swallowing, voice changes, bowel/bladder incontinence  Incision is healing well without erythema, edema, dehiscence, drainage, or underlying fluid collection      XR on 3/7 showed intact, stable, adequate instrumentation s/p C3/4 ACDF      At this time, I will clear his rigid cervical collar and allow gradual return to ADLs, without significant strain  I ordered PT for neck stiffness (expected after being in a rigid cervical collar for 6 weeks) and physiatry referral for work clearance  I advised him to still take precautions with heavy lifting (more than 25 lbs) and excessive movements of the neck while returning to activities and work  Continue routine postoperative follow-up, next for 3-month POV   All questions and concerns were addressed during this visit "  Impairments: abnormal or restricted ROM, abnormal movement, activity intolerance, impaired physical strength, lacks appropriate home exercise program, pain with function and poor body mechanics    Symptom irritability: lowUnderstanding of Dx/Px/POC: fair   Prognosis: fair    Goals  STG  -Patient will be IND with basic level HEP within 4 weeks in order to make improvements at home   - Patient will have a decrease in 2 points on the NPRS within 4 week in order to improve quality of life  - Patient will have 75% of cervial PROM within 4 weeks in order to improve function      LTG  -Patient will be IND with an advanced level HEP within 8 weeks in order to make improvements at home   - Patient will have a decrease in 3 points on the NPRS within 6 week in order to improve quality of life  - Patient will have 75% of cervical AROM within 8 weeks in order to improve function  - Patient will reach his FOTO score goal within 10 weeks  - Patient will return to work safely within 12 weeks    Plan  Plan details: Skilled PT to improve strength, ROM, flexibility, endurance, pain, and function  Patient would benefit from: PT eval and skilled physical therapy  Referral necessary: No  Planned modality interventions: biofeedback and cryotherapy  Planned therapy interventions: activity modification, ADL retraining, ADL training, balance, manual therapy, joint mobilization, massage, Lovell taping, muscle pump exercises, neuromuscular re-education, motor coordination training, postural training, patient education, strengthening, stretching, therapeutic activities, therapeutic exercise, therapeutic training, home exercise program, graded exercise, graded activity, gait training, functional ROM exercises and flexibility  Frequency: 2x week  Duration in weeks: 12  Plan of Care beginning date: 3/16/2022  Plan of Care expiration date: 6/8/2022  Treatment plan discussed with: patient        Subjective Evaluation    History of Present Illness  Date of surgery: 1/26/2022  Mechanism of injury: Patient was seen in physical therapy several month ago with BL neck pain and complaint of BL arm paraesthesias  Symptoms would worsen with cervical extension and also cause him problem with his gait  Patient has since saw neurosurgery and had a cervical fusion  Patient is s/p C3-C4 ACDF on 1/26/22  He is currently 7 weeks post op  Since his surgery he reports improvement in all symptoms including numbness and tingling sensations in his arms  He no longer feels unsteady with gait and does not report sensations that he "peed himself" around the upper thigh area   He does report occasional "hot sensation" at his dorsal wrist but this only happens periodically  He reports his neuro surgeon is aware of this  If patient's lingering symptoms persist he reports he is going to follow back up with his neurosurgeon  Kit Blank he is scheduled for a 3 month post op visit  His goals for therapy are to reduce neck stiffness and return to ADLs  Patient also reports since he has been wearing his cervical collar, the middle of his back has been stiff in addition to his neck  From post op visit from neurosurgery:  "XR on 3/7 showed intact, stable, adequate instrumentation s/p C3/4 ACDF  At this time, I will clear his rigid cervical collar and allow gradual return to ADLs, without significant strain  I ordered PT for neck stiffness (expected after being in a rigid cervical collar for 6 weeks) and physiatry referral for work clearance  I advised him to still take precautions with heavy lifting (more than 25 lbs) and excessive movements of the neck while returning to activities and work"  Pain  Current pain ratin  At best pain ratin  At worst pain rating: 3  Quality: discomfort and tight  Relieving factors: relaxation and rest  Progression: no change    Social Support  Steps to enter house: no  Stairs in house: no   Lives in: [place]  Lives with: young children    Employment status: not working  Hand dominance: right      Diagnostic Tests  X-ray: normal  Treatments  Previous treatment: physical therapy and immobilization  Patient Goals  Patient goals for therapy: decreased edema, decreased pain, increased motion, independence with ADLs/IADLs, increased strength and return to sport/leisure activities          Objective     Palpation   Left   Hypertonic in the upper trapezius  Tenderness of the suboccipitals and upper trapezius  Right   Hypertonic in the upper trapezius  Tenderness of the suboccipitals and upper trapezius       Neurological Testing     Sensation   Cervical/Thoracic   Left   Intact: light touch    Right   Intact: light touch    Reflexes   Left   Biceps (C5/C6): normal (2+)  Brachioradialis (C6): normal (2+)  Triceps (C7): normal (2+)  Sahu's reflex: negative    Right   Biceps (C5/C6): normal (2+)  Brachioradialis (C6): normal (2+)  Triceps (C7): normal (2+)  Sahu's reflex: negative    Additional Neurological Details  Patellar reflex 2+ bilateral, WNL    Dermatomes WNL  Patient denies sensation deficits of C1-T2 in both arms    Myotomes WNL   No deficits C1-T1    Patient denies bowel and bladder dysfunction or saddle paraesthesias    Active Range of Motion   Cervical/Thoracic Spine       Cervical    Flexion: 31 degrees  Restriction level: minimal  Extension: 38 degrees     Restriction level: minimal  Left lateral flexion: 24 degrees     Restriction level: moderate  Right lateral flexion: 28 degrees     Restriction level moderate  Left rotation: 52 degrees Restriction level: moderate  Right rotation: 54 degrees    Restriction level: moderate    Additional Active Range of Motion Details  Patient denies pain or any symptoms reproduction with cervical motions  He was educated on not performing cervical motions to end range or in excess    Joint Play   Joints within functional limits: T7, T8 and T9     Hypomobile: T2, T3, T4, T5 and T6     Comments: Cervical spine was not assessed  Thoracici spine is stiff and hypomobile possibly secondary to cervical for 6 weeks      Strength/Myotome Testing     Left Shoulder     Planes of Motion   Extension: 4+   Abduction: 5   External rotation at 0°: 4+   Internal rotation at 0°: 4+     Right Shoulder     Planes of Motion   Extension: 4+   Abduction: 5   External rotation at 0°: 4+   Internal rotation at 0°: 4+     Left Elbow   Flexion: 5  Extension: 5    Right Elbow   Flexion: 5  Extension: 5    Left Wrist/Hand   Wrist extension: 5  Wrist flexion: 5  Thumb extension: 5    Right Wrist/Hand   Wrist extension: 5  Wrist flexion: 5  Thumb extension: 5    Additional Strength Details  Cervical strength not assessed at this time    General Comments:      Cervical/Thoracic Comments  Gait Observation  No ataxia or abnormal gait  Patient does not report leg or muscle weakness in his lower extremities  No falls reported               Precautions:   s/p C3/4 ACDF on 1/26/22, no lifting more than 25 lbs, No excessive cervical motions, gradual return to ADLs      Manuals 3/16            Cervical PROM              STM upper traps                                       Neuro Re-Ed             Gentle cervical isometrics             Rows              UE ext              No monies                                                                  Ther Ex             Seat AROM flex/ext Performed  gentle 10x 2" BL             Seat rotation Performed gentle 10x2" BL            Scapular retractions Performed HEP 2x10 2"            Small half neck rolls             Upper trap stretch no OP             Levator stretch no OP             Thoracic extensions over chair                                                     Ther Activity                                       Gait Training                                       Modalities

## 2022-03-17 ENCOUNTER — TELEPHONE (OUTPATIENT)
Dept: NEUROSURGERY | Facility: CLINIC | Age: 47
End: 2022-03-17

## 2022-03-17 NOTE — TELEPHONE ENCOUNTER
3/17/22  L/M WITH ANSWERING SERVICE AT DR PAYNE'S OFFICE [phone] TO CALL BACK AND ADVISE THEY RECEIVED FAX ON PT TO SCHEDULE APPT

## 2022-03-22 ENCOUNTER — OFFICE VISIT (OUTPATIENT)
Dept: PHYSICAL THERAPY | Facility: MEDICAL CENTER | Age: 47
End: 2022-03-22
Payer: COMMERCIAL

## 2022-03-22 DIAGNOSIS — M48.02 CERVICAL STENOSIS OF SPINE: Primary | ICD-10-CM

## 2022-03-22 PROCEDURE — 97110 THERAPEUTIC EXERCISES: CPT

## 2022-03-22 PROCEDURE — 97112 NEUROMUSCULAR REEDUCATION: CPT

## 2022-03-22 PROCEDURE — 97140 MANUAL THERAPY 1/> REGIONS: CPT

## 2022-03-22 NOTE — PROGRESS NOTES
Daily Note     Today's date: 3/22/2022  Patient name: Hiral Gong  : 1975  MRN: [id number]  Referring provider: Wilman Reyez MD  Dx:   Encounter Diagnosis     ICD-10-CM    1  Cervical stenosis of spine  M48 02        Start Time: 1353  Stop Time: 1433  Total time in clinic (min): 40 minutes    Subjective: Patient reports to therapy with discomfort on the left side of his neck that he ranks 3/10 with head motion  He does not report any radicular symptoms even with head motions  He is not having any pain with rest       Objective: See treatment diary below      Assessment: Tolerated treatment well  All range of motion exercises today were kept short of pain or discomfort  Exercises were also implemented today to address the patient's posture from being in a cervical collar for 6 weeks  Patient with reduced stiffness following interventions today  Patient was reminded of his doctors recommendations of no lifting more than 25 pounds while performing ADLs  Patient demonstrated fatigue post treatment and would benefit from continued PT      Plan: Continue per plan of care        Precautions:   s/p C3/4 ACDF on 22, no lifting more than 25 lbs, No excessive cervical motions, gradual return to ADLs      Manuals 3/16 3/22           Cervical PROM              STM upper traps  TE 12'                                     Neuro Re-Ed             Gentle cervical isometrics             Rows   YTB 2x10           UE ext   YTB 2x10           No monies   [id number]            Horizontal abduction   YTB 2x10                                                  Ther Ex             Seat cervical AROM flex/ext Performed  gentle 10x 2" BL  2x10 2"           Seat cervical AROM rotation Performed gentle 10x2" BL 2x10 2"           Scapular retractions Performed HEP 2x10 2" 2x10 2"           Small half neck rolls  2x10 2"            Upper trap stretch no OP  10x 5" BL            Levator stretch no OP             Thoracic extensions over chair   2x10 keeping neck neutral                                                   Ther Activity                                       Gait Training                                       Modalities

## 2022-03-24 ENCOUNTER — OFFICE VISIT (OUTPATIENT)
Dept: PHYSICAL THERAPY | Facility: MEDICAL CENTER | Age: 47
End: 2022-03-24
Payer: COMMERCIAL

## 2022-03-24 DIAGNOSIS — M48.02 CERVICAL STENOSIS OF SPINE: Primary | ICD-10-CM

## 2022-03-24 PROCEDURE — 97112 NEUROMUSCULAR REEDUCATION: CPT

## 2022-03-24 PROCEDURE — 97140 MANUAL THERAPY 1/> REGIONS: CPT

## 2022-03-24 NOTE — PROGRESS NOTES
Daily Note     Today's date: 3/24/2022  Patient name: Jennifer Puga  : 1975  MRN: [id number]  Referring provider: Jhon Lambert MD  Dx:   Encounter Diagnosis     ICD-10-CM    1  Cervical stenosis of spine  M48 02        Start Time: 1530  Stop Time: 1610  Total time in clinic (min): 40 minutes    Subjective: Patient reports having some soreness on the left side of his neck that began this morning  He reports he may have slept on it funny  Objective: See treatment diary below      Assessment: Tolerated treatment well  Further explanation revealed patient's soreness is described as more of a stiffness in his neck  Patient got relief of this symptom following manual therapy technique performed today  Patient educated to continue home exercises within tolerance today and to not push too far into his motion  He will be progress as tolerated  Addition of cervical isometrics may be considered for next weeks session  Patient demonstrated fatigue post treatment and would benefit from continued PT      Plan: Continue per plan of care        Precautions:   s/p C3/4 ACDF on 22, no lifting more than 25 lbs, No excessive cervical motions, gradual return to ADLs    1:1 time [id number]  Manuals 3/16 3/22 3/24          Cervical PROM              STM upper traps  TE 12' TE 15'                                    Neuro Re-Ed             Gentle cervical isometrics             Rows   YTB 2x10 YTB 2x10           UE ext   YTB 2x10 YTB 2x10           No monies   [id number]  YTB 2x10          Horizontal abduction   YTB 2x10 YTB 2x10                                                  Ther Ex             Seat cervical AROM flex/ext Performed  gentle 10x 2" BL  2x10 2" 2x10 2"          Seat cervical AROM rotation Performed gentle 10x2" BL 2x10 2" 2x10 2"          Scapular retractions Performed HEP 2x10 2" 2x10 2" 2x10 2"          Small half neck rolls  2x10 2"  2x10 2"          Upper trap stretch no OP  10x 5" BL  10x 5" BL Levator stretch no OP             Thoracic extensions over chair   2x10 keeping neck neutral  2x10 keeping neck neutral                                                  Ther Activity                                       Gait Training                                       Modalities

## 2022-03-25 ENCOUNTER — OFFICE VISIT (OUTPATIENT)
Dept: FAMILY MEDICINE CLINIC | Facility: CLINIC | Age: 47
End: 2022-03-25
Payer: COMMERCIAL

## 2022-03-25 VITALS
HEIGHT: 66 IN | WEIGHT: 216 LBS | BODY MASS INDEX: 34.72 KG/M2 | DIASTOLIC BLOOD PRESSURE: 80 MMHG | HEART RATE: 76 BPM | OXYGEN SATURATION: 98 % | SYSTOLIC BLOOD PRESSURE: 134 MMHG | RESPIRATION RATE: 16 BRPM

## 2022-03-25 DIAGNOSIS — F41.9 ANXIETY: Primary | ICD-10-CM

## 2022-03-25 DIAGNOSIS — N52.9 ERECTILE DYSFUNCTION, UNSPECIFIED ERECTILE DYSFUNCTION TYPE: ICD-10-CM

## 2022-03-25 PROCEDURE — 99214 OFFICE O/P EST MOD 30 MIN: CPT | Performed by: FAMILY MEDICINE

## 2022-03-25 PROCEDURE — 3079F DIAST BP 80-89 MM HG: CPT | Performed by: FAMILY MEDICINE

## 2022-03-25 PROCEDURE — 3075F SYST BP GE 130 - 139MM HG: CPT | Performed by: FAMILY MEDICINE

## 2022-03-25 RX ORDER — TADALAFIL 20 MG/1
20 TABLET ORAL DAILY PRN
Qty: 30 TABLET | Refills: 4 | Status: SHIPPED | OUTPATIENT
Start: 2022-03-25

## 2022-03-25 RX ORDER — ALPRAZOLAM 0.25 MG/1
0.25 TABLET ORAL
Qty: 10 TABLET | Refills: 0 | Status: SHIPPED | OUTPATIENT
Start: 2022-03-25

## 2022-03-25 NOTE — PROGRESS NOTES
Subjective:      Patient ID: Amalia Noriega is a 41 y o  male  Anxiety  Presents for initial visit  Onset was 1 to 4 weeks ago  The problem has been unchanged  Symptoms include nervous/anxious behavior  Patient reports no depressed mood, restlessness, shortness of breath or suicidal ideas  Symptoms occur most days  The severity of symptoms is interfering with daily activities and causing significant distress  The symptoms are aggravated by social activities  Nighttime awakenings: none  There are no known risk factors  Past treatments include nothing  Erectile Dysfunction  The current episode started more than 1 month ago  The problem is unchanged  Non-physiologic factors contributing to erectile dysfunction are anxiety  Irritative symptoms do not include frequency or urgency  Obstructive symptoms do not include incomplete emptying  Pertinent negatives include no chills or dysuria  Nothing aggravates the symptoms  Past treatments include tadalafil  The treatment provided moderate relief  He has had no adverse reactions caused by medications  There are no known risk factors  Past Medical History:   Diagnosis Date    Diverticulitis     Hyperlipemia     Hypertension     Prediabetes     Psoriasis        Family History   Problem Relation Age of Onset    Diabetes Maternal Grandmother     Diabetes Paternal Grandfather     Cancer Father     Colon cancer Neg Hx        Past Surgical History:   Procedure Laterality Date    ABDOMINAL SURGERY      COLONOSCOPY      OH ARTHRODESIS ANT INTERBODY INC DISCECTOMY, CERVICAL BELOW C2 Bilateral 1/26/2022    Procedure: C3/4 Anterior Cervical Discectomy and Fusion (neuromonitoring); Surgeon: Victor Manuel Esteves MD;  [facility];  Service: Neurosurgery    [place] COLONOSCOPY FLX DX W/COLLJ [facility] REHABILITATION WHEN PFRMD N/A 4/3/2019    Procedure: COLONOSCOPY;  Surgeon: Bk Redman MD;  Location: [facility];   Service: Colorectal    OH LAP,SURG,COLECTOMY, PARTIAL, Freddy Siddiqi N/A 1/8/2020    Procedure: RESECTION COLON SIGMOID LAPAROSCOPIC, LAPAROSCOPIC SPLENIC FLEXURE MOBILIZATION;  Surgeon: Christos Soni MD;  [facility];  Service: Colorectal    [place] SIGMOIDOSCOPY FLX DX W/COLLJ SPEC BR/WA IF PFRMD N/A 1/8/2020    Procedure: SIGMOIDOSCOPY FLEXIBLE;  Surgeon: Christos Soni MD;  [facility];  Service: Colorectal    TENDON REPAIR      right elbow        reports that he has quit smoking  He has a 60 00 pack-year smoking history  His smokeless tobacco use includes chew  He reports current alcohol use  He reports current drug use  Drug: Marijuana  Current Outpatient Medications:     Cholecalciferol (VITAMIN D) 2000 units CAPS, Take 1 capsule by mouth daily, Disp: , Rfl:     losartan (COZAAR) 50 mg tablet, Take 1 tablet (50 mg total) by mouth daily, Disp: 90 tablet, Rfl: 1    Multiple Vitamins-Minerals (multivitamin with minerals) tablet, Take 1 tablet by mouth daily, Disp: , Rfl:     pregabalin (LYRICA) 75 mg capsule, Take 1 capsule (75 mg total) by mouth 2 (two) times a day, Disp: 60 capsule, Rfl: 1    rosuvastatin (CRESTOR) 5 mg tablet, Take 1 tablet (5 mg total) by mouth daily, Disp: 90 tablet, Rfl: 1    acetaminophen (TYLENOL) 650 mg CR tablet, Take 1 tablet (650 mg total) by mouth every 8 (eight) hours as needed for mild pain (Patient not taking: Reported on 3/10/2022 ), Disp: 30 tablet, Rfl: 0    albuterol (PROVENTIL HFA,VENTOLIN HFA) 90 mcg/act inhaler, Inhale 2 puffs every 6 (six) hours as needed for wheezing or shortness of breath (Patient not taking: Reported on 3/10/2022 ), Disp: 1 Inhaler, Rfl: 0    budesonide-formoterol (SYMBICORT) 80-4 5 MCG/ACT inhaler, Inhale 2 puffs 2 (two) times a day Rinse mouth after use   (Patient not taking: Reported on 3/10/2022 ), Disp: 1 Inhaler, Rfl: 2    docusate sodium (COLACE) 100 mg capsule, Take 1 capsule (100 mg total) by mouth 2 (two) times a day (Patient not taking: Reported on 3/10/2022 ), Disp: 20 capsule, Rfl: 0    Hydrocortisone Butyrate 0 1 % CREA, as needed  (Patient not taking: Reported on 3/25/2022 ), Disp: , Rfl: 3    metFORMIN (GLUCOPHAGE) 500 mg tablet, Take 1 tablet (500 mg total) by mouth daily with breakfast (Patient not taking: Reported on 2/9/2022 ), Disp: 90 tablet, Rfl: 1    methocarbamol (ROBAXIN) 750 mg tablet, Take 1 tablet (750 mg total) by mouth every 6 (six) hours as needed for muscle spasms (Patient not taking: Reported on 3/10/2022 ), Disp: 40 tablet, Rfl: 0    methylPREDNISolone 4 MG tablet therapy pack, Use as directed on package (Patient not taking: Reported on 3/10/2022 ), Disp: 21 each, Rfl: 0    Ustekinumab (STELARA SC), Inject under the skin every 3 (three) months  (Patient not taking: Reported on 3/25/2022 ), Disp: , Rfl:     The following portions of the patient's history were reviewed and updated as appropriate: allergies, current medications, past family history, past medical history, past social history, past surgical history and problem list     Review of Systems   Constitutional: Negative for chills  Respiratory: Negative for shortness of breath  Genitourinary: Negative for dysuria, frequency, incomplete emptying and urgency  Psychiatric/Behavioral: Negative for suicidal ideas  The patient is nervous/anxious  Objective:    /80 (BP Location: Left arm, Patient Position: Sitting, Cuff Size: Large)   Pulse 76   Resp 16   Ht 5' 6" (1 676 m)   Wt 98 kg (216 lb)   SpO2 98%   BMI 34 86 kg/m²      Physical Exam  Constitutional:       Appearance: He is well-developed  HENT:      Mouth/Throat:      Pharynx: No oropharyngeal exudate  Cardiovascular:      Rate and Rhythm: Normal rate and regular rhythm  Pulmonary:      Effort: Pulmonary effort is normal       Breath sounds: Normal breath sounds  Abdominal:      General: Bowel sounds are normal       Palpations: Abdomen is soft     Neurological:      Mental Status: He is alert and oriented to person, place, and time  Psychiatric:         Behavior: Behavior normal          Judgment: Judgment normal            No results found for this or any previous visit (from the past 1008 hour(s))  Assessment/Plan:    No problem-specific Assessment & Plan notes found for this encounter  Problem List Items Addressed This Visit        Other    Erectile dysfunction     Patient has been taking Cialis OTC/ Online  Requesting a refill  Relevant Medications    tadalafil (CIALIS) 20 MG tablet    Anxiety - Primary     New onset  Infrequent, usually worse with social situations  Requesting prn med only  Declines zoloft/ SSRI  Prefers not to be on a daily medications since symptoms are intermittent           Relevant Medications    ALPRAZolam (XANAX) 0 25 mg tablet

## 2022-03-25 NOTE — ASSESSMENT & PLAN NOTE
New onset  Infrequent, usually worse with social situations  Requesting prn med only  Declines zoloft/ SSRI  Prefers not to be on a daily medications since symptoms are intermittent

## 2022-03-30 DIAGNOSIS — M54.10 RADICULOPATHY, UNSPECIFIED SPINAL REGION: ICD-10-CM

## 2022-03-31 ENCOUNTER — APPOINTMENT (OUTPATIENT)
Dept: PHYSICAL THERAPY | Facility: MEDICAL CENTER | Age: 47
End: 2022-03-31
Payer: COMMERCIAL

## 2022-03-31 NOTE — PROGRESS NOTES
Patient called on 3/30/22 and informed the staff he wanted to remove his remaining appointments  He reported his stiffness is improved and he will continue his exercises at home  He will be considered a self discharge from physical therapy at this time

## 2022-04-05 RX ORDER — PREGABALIN 75 MG/1
CAPSULE ORAL
Qty: 60 CAPSULE | Refills: 1 | Status: SHIPPED | OUTPATIENT
Start: 2022-04-05 | End: 2022-07-01 | Stop reason: SDUPTHER

## 2022-06-23 DIAGNOSIS — E78.5 HYPERLIPIDEMIA, UNSPECIFIED HYPERLIPIDEMIA TYPE: ICD-10-CM

## 2022-06-23 DIAGNOSIS — I10 ESSENTIAL HYPERTENSION: ICD-10-CM

## 2022-06-23 RX ORDER — LOSARTAN POTASSIUM 50 MG/1
TABLET ORAL
Qty: 90 TABLET | Refills: 1 | Status: SHIPPED | OUTPATIENT
Start: 2022-06-23

## 2022-06-23 RX ORDER — ROSUVASTATIN CALCIUM 5 MG/1
5 TABLET, COATED ORAL DAILY
Qty: 90 TABLET | Refills: 1 | Status: SHIPPED | OUTPATIENT
Start: 2022-06-23

## 2022-07-01 ENCOUNTER — OFFICE VISIT (OUTPATIENT)
Dept: NEUROSURGERY | Facility: CLINIC | Age: 47
End: 2022-07-01
Payer: COMMERCIAL

## 2022-07-01 VITALS
RESPIRATION RATE: 18 BRPM | SYSTOLIC BLOOD PRESSURE: 122 MMHG | DIASTOLIC BLOOD PRESSURE: 78 MMHG | WEIGHT: 221 LBS | BODY MASS INDEX: 35.52 KG/M2 | TEMPERATURE: 98.8 F | HEART RATE: 73 BPM | HEIGHT: 66 IN

## 2022-07-01 DIAGNOSIS — M48.02 CERVICAL STENOSIS OF SPINE: Primary | ICD-10-CM

## 2022-07-01 DIAGNOSIS — Z98.890 POST-OPERATIVE STATE: ICD-10-CM

## 2022-07-01 DIAGNOSIS — G89.18 POST-OP PAIN: ICD-10-CM

## 2022-07-01 DIAGNOSIS — M54.10 RADICULOPATHY, UNSPECIFIED SPINAL REGION: ICD-10-CM

## 2022-07-01 DIAGNOSIS — Z98.1 S/P CERVICAL SPINAL FUSION: ICD-10-CM

## 2022-07-01 PROCEDURE — 99212 OFFICE O/P EST SF 10 MIN: CPT | Performed by: NEUROLOGICAL SURGERY

## 2022-07-01 PROCEDURE — 3074F SYST BP LT 130 MM HG: CPT | Performed by: NEUROLOGICAL SURGERY

## 2022-07-01 PROCEDURE — 3078F DIAST BP <80 MM HG: CPT | Performed by: NEUROLOGICAL SURGERY

## 2022-07-01 RX ORDER — PREGABALIN 75 MG/1
75 CAPSULE ORAL 2 TIMES DAILY
Qty: 60 CAPSULE | Refills: 3 | Status: SHIPPED | OUTPATIENT
Start: 2022-07-01 | End: 2022-10-17 | Stop reason: SDUPTHER

## 2022-07-01 RX ORDER — METHOCARBAMOL 750 MG/1
750 TABLET, FILM COATED ORAL EVERY 6 HOURS PRN
Qty: 30 TABLET | Refills: 3 | Status: SHIPPED | OUTPATIENT
Start: 2022-07-01 | End: 2022-10-17 | Stop reason: SDUPTHER

## 2022-07-01 NOTE — PROGRESS NOTES
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), and worsening gait imbalance s/p C3/4 ACDF on 1/26/22  Today, he still reports improved preoperative symptoms overall including improvement in intermittent paresthesias in left thumb region (used to feel hot, not numbness)  Since returning to work, he has been feeling neck soreness and "cramps"  Denies new symptoms including numbness, weakness, fine motor dysfunction, gait imbalance, trauma, difficulty swallowing, voice changes, bowel/bladder incontinence  Incision is healing well without erythema, edema, dehiscence, drainage, or underlying fluid collection  There is a pimple lateral to the incision, which I told him to leave alone to prevent cross contamination of incision  Taking Lyrica with significant relief (ran out of refill for 5 days and could not take, which acutely worsened paresthesias, mostly in right anterior thigh and shin)  Worked with PT in March  Returned to ADLs and work without significant strain or new neurologic symptoms  Most recent XR on 3/7 showed intact, stable, adequate instrumentation s/p C3/4 ACDF  At this time, I have no neurosurgical or postoperative concerns  I will refill Lyrica and order Robaxin for his neck "cramps" so he can remain active and work without strain   Continue routine postoperative follow-up yearly, no further imaging unless clinically indicated  All questions and concerns were addressed during this visit  CHIEF COMPLAINT    Chief Complaint   Patient presents with    Follow-up     Cervical stenosis of spine         HISTORY    History of Present Illness     41y o  year old male     HPI    See Discussion    REVIEW OF SYSTEMS    Review of Systems   Constitutional: Negative  HENT: Negative  Eyes: Negative  Respiratory: Negative  Cardiovascular: Negative  Gastrointestinal: Negative  Endocrine: Negative  Genitourinary: Negative  Musculoskeletal: Positive for neck pain (soreness-straining) and neck stiffness  3 MO FOLLOW UP TO PT AND PHYSIATRY  last ov w/Mathur 3/10-Cervical stenosis of spine    Pt last ov 3/24    Non smoker    Pt c/o Right calf numbness & Left wrist (prickly)    Skin: Negative  Allergic/Immunologic: Negative  Neurological: Positive for numbness (bilateral top of wrist prickly feeling x 4 days)  Hematological: Negative  Neg  AC     Psychiatric/Behavioral: Negative  All other systems reviewed and are negative  Meds/Allergies     Current Outpatient Medications   Medication Sig Dispense Refill    acetaminophen (TYLENOL) 650 mg CR tablet Take 1 tablet (650 mg total) by mouth every 8 (eight) hours as needed for mild pain (Patient not taking: Reported on 3/10/2022 ) 30 tablet 0    albuterol (PROVENTIL HFA,VENTOLIN HFA) 90 mcg/act inhaler Inhale 2 puffs every 6 (six) hours as needed for wheezing or shortness of breath (Patient not taking: Reported on 3/10/2022 ) 1 Inhaler 0    ALPRAZolam (XANAX) 0 25 mg tablet Take 1 tablet (0 25 mg total) by mouth daily at bedtime as needed for anxiety 10 tablet 0    budesonide-formoterol (SYMBICORT) 80-4 5 MCG/ACT inhaler Inhale 2 puffs 2 (two) times a day Rinse mouth after use   (Patient not taking: Reported on 3/10/2022 ) 1 Inhaler 2    Cholecalciferol (VITAMIN D) 2000 units CAPS Take 1 capsule by mouth daily      docusate sodium (COLACE) 100 mg capsule Take 1 capsule (100 mg total) by mouth 2 (two) times a day (Patient not taking: Reported on 3/10/2022 ) 20 capsule 0    Hydrocortisone Butyrate 0 1 % CREA as needed  (Patient not taking: Reported on 3/25/2022 )  3    losartan (COZAAR) 50 mg tablet TAKE 1 TABLET BY MOUTH EVERY DAY 90 tablet 1    metFORMIN (GLUCOPHAGE) 500 mg tablet Take 1 tablet (500 mg total) by mouth daily with breakfast (Patient not taking: Reported on 2/9/2022 ) 90 tablet 1    methocarbamol (ROBAXIN) 750 mg tablet Take 1 tablet (750 mg total) by mouth every 6 (six) hours as needed for muscle spasms (Patient not taking: Reported on 3/10/2022 ) 40 tablet 0    methylPREDNISolone 4 MG tablet therapy pack Use as directed on package (Patient not taking: Reported on 3/10/2022 ) 21 each 0    Multiple Vitamins-Minerals (multivitamin with minerals) tablet Take 1 tablet by mouth daily      pregabalin (LYRICA) 75 mg capsule TAKE 1 CAPSULE BY MOUTH TWICE A DAY 60 capsule 1    rosuvastatin (CRESTOR) 5 mg tablet TAKE 1 TABLET (5 MG TOTAL) BY MOUTH DAILY  90 tablet 1    tadalafil (CIALIS) 20 MG tablet Take 1 tablet (20 mg total) by mouth daily as needed for erectile dysfunction 30 tablet 4    Ustekinumab (STELARA SC) Inject under the skin every 3 (three) months  (Patient not taking: Reported on 3/25/2022 )       No current facility-administered medications for this visit  No Known Allergies    PAST HISTORY    Past Medical History:   Diagnosis Date    Diverticulitis     Hyperlipemia     Hypertension     Prediabetes     Psoriasis        Past Surgical History:   Procedure Laterality Date    ABDOMINAL SURGERY      COLONOSCOPY      [place] ARTHRODESIS ANT INTERBODY INC DISCECTOMY, CERVICAL BELOW C2 Bilateral 1/26/2022    Procedure: C3/4 Anterior Cervical Discectomy and Fusion (neuromonitoring);   Surgeon: Autumn Segura MD;  [facility];  Service: Neurosurgery    [place] COLONOSCOPY FLX DX W/COLLJ [facility] INPATIENT REHABILITATION WHEN PFRMD N/A 4/3/2019    Procedure: COLONOSCOPY;  Surgeon: Mani Faulkner MD;  [facility]  GI LAB; Service: Colorectal    [place] LAP,SURG,COLECTOMY, PARTIAL, W/ANAST N/A 1/8/2020    Procedure: RESECTION COLON SIGMOID LAPAROSCOPIC, LAPAROSCOPIC SPLENIC FLEXURE MOBILIZATION;  Surgeon: Mani Faulkner MD;  [facility];  Service: Colorectal    [place] SIGMOIDOSCOPY FLX DX W/COLLJ SPEC BR/WA IF PFRMD N/A 1/8/2020    Procedure: Yanci Bauer;  Surgeon: Mani Faulkner MD;  [facility];  Service: Colorectal    TENDON REPAIR      right elbow       Social History     Tobacco Use    Smoking status: Former Smoker     Packs/day: 2 00     Years: 30 00     Pack years: 60 00    Smokeless tobacco: Current User     Types: Chew    Tobacco comment: Quit 20 months ago   Vaping Use    Vaping Use: Never used   Substance Use Topics    Alcohol use: Yes     Comment: occasional alcohol use    Drug use: Yes     Types: Marijuana     Comment: every couple weeks       Family History   Problem Relation Age of Onset    Diabetes Maternal Grandmother     Diabetes Paternal Grandfather     Cancer Father     Colon cancer Neg Hx          The following portions of the patient's history were reviewed in this encounter and updated as appropriate: Past medical, surgical, family, and social history, as well as medications, allergies, and review of systems  EXAM    Vitals:Blood pressure 122/78, pulse 73, temperature 99 1 °F (37 3 °C), temperature source Temporal, resp  rate 18, height 5' 6" (1 676 m), weight 100 kg (221 lb)  ,There is no height or weight on file to calculate BMI  Physical Exam  Vitals and nursing note reviewed  Constitutional:       Appearance: Normal appearance  He is normal weight  HENT:      Head: Normocephalic and atraumatic     Eyes:      Extraocular Movements: Extraocular movements intact and EOM normal       Pupils: Pupils are equal, round, and reactive to light  Cardiovascular:      Rate and Rhythm: Normal rate and regular rhythm  Pulses: Normal pulses  Heart sounds: Normal heart sounds  Pulmonary:      Effort: Pulmonary effort is normal       Breath sounds: Normal breath sounds  Abdominal:      General: Abdomen is flat  Palpations: Abdomen is soft  Musculoskeletal:         General: Normal range of motion  Cervical back: Normal range of motion  Neurological:      General: No focal deficit present  Mental Status: He is alert and oriented to person, place, and time  Mental status is at baseline  GCS: GCS eye subscore is 4  GCS verbal subscore is 5  GCS motor subscore is 6  Cranial Nerves: Cranial nerves are intact  Sensory: Sensation is intact  Motor: Motor function is intact  Coordination: Coordination is intact  Gait: Gait is intact  Deep Tendon Reflexes: Strength normal and reflexes are normal and symmetric  Psychiatric:         Mood and Affect: Mood normal          Speech: Speech normal          Behavior: Behavior normal          Neurologic Exam     Mental Status   Oriented to person, place, and time  Attention: normal    Speech: speech is normal   Level of consciousness: alert    Cranial Nerves     CN II   Visual fields full to confrontation  Visual acuity: normal  Right visual field deficit: none  Left visual field deficit: none     CN III, IV, VI   Pupils are equal, round, and reactive to light  Extraocular motions are normal    CN III: no CN III palsy  CN VI: no CN VI palsy  Nystagmus: none   Diplopia: none  Ophthalmoparesis: none  Upgaze: normal  Downgaze: normal  Conjugate gaze: present    CN V   Facial sensation intact  Right facial sensation deficit: none  Left facial sensation deficit: none    CN VII   Facial expression full, symmetric     Right facial weakness: none  Left facial weakness: none    CN VIII   CN VIII normal      CN IX, X   CN IX normal    CN X normal    Palate: symmetric    CN XI   CN XI normal    Right sternocleidomastoid strength: normal  Left sternocleidomastoid strength: normal  Right trapezius strength: normal  Left trapezius strength: normal    CN XII   CN XII normal    Tongue deviation: none    Motor Exam   Muscle bulk: normal  Overall muscle tone: normal  Right arm pronator drift: absent  Left arm pronator drift: absent    Strength   Strength 5/5 throughout  Sensory Exam   Light touch normal      Gait, Coordination, and Reflexes     Gait  Gait: normal    Reflexes   Reflexes 2+ except as noted  Incision healed      MEDICAL DECISION MAKING    Imaging Studies:     No results found  I have personally reviewed pertinent films in [facility]    Neurosurgeon

## 2022-09-23 ENCOUNTER — TELEPHONE (OUTPATIENT)
Dept: PAIN MEDICINE | Facility: CLINIC | Age: 47
End: 2022-09-23

## 2022-10-11 ENCOUNTER — TELEPHONE (OUTPATIENT)
Dept: NEUROSURGERY | Facility: CLINIC | Age: 47
End: 2022-10-11

## 2022-10-11 DIAGNOSIS — M50.120 CERVICAL DISC DISORDER WITH RADICULOPATHY OF MID-CERVICAL REGION: Primary | ICD-10-CM

## 2022-10-11 NOTE — TELEPHONE ENCOUNTER
Patient LM on nurse line requesting a call back regarding some symptoms that he has been experiencing-pain in his fingertips and feet I believe he said in the message  I attempted to call him back to obtain more info with no answer  LM for call back at his convenience
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
## 2022-10-13 NOTE — TELEPHONE ENCOUNTER
Per Dr Calixto Albarran, "  You can order XR flexion extension of cervical spine  If any acutely concerning findings, I will let you know to schedule an appointment w me  Thanks  LH      Script placed and I contacted patient and made him aware  He will let us know once he goes for the x-ray  He was appreciative for the call back

## 2022-10-17 ENCOUNTER — OFFICE VISIT (OUTPATIENT)
Dept: PAIN MEDICINE | Facility: CLINIC | Age: 47
End: 2022-10-17
Payer: COMMERCIAL

## 2022-10-17 VITALS
SYSTOLIC BLOOD PRESSURE: 137 MMHG | HEIGHT: 66 IN | RESPIRATION RATE: 16 BRPM | HEART RATE: 75 BPM | DIASTOLIC BLOOD PRESSURE: 90 MMHG | BODY MASS INDEX: 38.41 KG/M2 | WEIGHT: 239 LBS

## 2022-10-17 DIAGNOSIS — M54.10 RADICULOPATHY, UNSPECIFIED SPINAL REGION: ICD-10-CM

## 2022-10-17 DIAGNOSIS — Z98.890 POST-OPERATIVE STATE: ICD-10-CM

## 2022-10-17 DIAGNOSIS — G89.4 CHRONIC PAIN SYNDROME: Primary | ICD-10-CM

## 2022-10-17 DIAGNOSIS — Z98.1 S/P CERVICAL SPINAL FUSION: ICD-10-CM

## 2022-10-17 DIAGNOSIS — G89.18 POST-OP PAIN: ICD-10-CM

## 2022-10-17 DIAGNOSIS — M50.120 CERVICAL DISC DISORDER WITH RADICULOPATHY OF MID-CERVICAL REGION: ICD-10-CM

## 2022-10-17 PROCEDURE — 99214 OFFICE O/P EST MOD 30 MIN: CPT

## 2022-10-17 RX ORDER — METHOCARBAMOL 750 MG/1
750 TABLET, FILM COATED ORAL EVERY 6 HOURS PRN
Qty: 30 TABLET | Refills: 3 | Status: SHIPPED | OUTPATIENT
Start: 2022-10-17

## 2022-10-17 RX ORDER — PREGABALIN 75 MG/1
75 CAPSULE ORAL 3 TIMES DAILY
Qty: 90 CAPSULE | Refills: 3 | Status: SHIPPED | OUTPATIENT
Start: 2022-10-17
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
## 2022-10-24 ENCOUNTER — APPOINTMENT (OUTPATIENT)
Dept: RADIOLOGY | Facility: CLINIC | Age: 47
End: 2022-10-24
Payer: COMMERCIAL

## 2022-10-24 DIAGNOSIS — M50.120 CERVICAL DISC DISORDER WITH RADICULOPATHY OF MID-CERVICAL REGION: ICD-10-CM

## 2022-10-24 PROCEDURE — 72052 X-RAY EXAM NECK SPINE 6/>VWS: CPT

## 2022-11-04 ENCOUNTER — TELEPHONE (OUTPATIENT)
Dept: NEUROSURGERY | Facility: CLINIC | Age: 47
End: 2022-11-04

## 2022-11-04 NOTE — TELEPHONE ENCOUNTER
Received a call from patient questioning the results of his xray  Returned call to patient and advised LP RN sent a mychart message stating that Dr Margo Knox did review the results and does not have any concerns and suggested continuing with current follow up plan  He stated an understanding and was appreciative of the call back

## 2022-11-23 ENCOUNTER — VBI (OUTPATIENT)
Dept: ADMINISTRATIVE | Facility: OTHER | Age: 47
End: 2022-11-23

## 2022-12-02 ENCOUNTER — OFFICE VISIT (OUTPATIENT)
Dept: FAMILY MEDICINE CLINIC | Facility: CLINIC | Age: 47
End: 2022-12-02

## 2022-12-02 VITALS
HEART RATE: 86 BPM | BODY MASS INDEX: 38.94 KG/M2 | WEIGHT: 242.3 LBS | HEIGHT: 66 IN | SYSTOLIC BLOOD PRESSURE: 125 MMHG | DIASTOLIC BLOOD PRESSURE: 80 MMHG | OXYGEN SATURATION: 98 %

## 2022-12-02 DIAGNOSIS — N52.9 ERECTILE DYSFUNCTION, UNSPECIFIED ERECTILE DYSFUNCTION TYPE: ICD-10-CM

## 2022-12-02 DIAGNOSIS — I10 ESSENTIAL HYPERTENSION: Primary | ICD-10-CM

## 2022-12-02 DIAGNOSIS — E11.9 CONTROLLED TYPE 2 DIABETES MELLITUS WITHOUT COMPLICATION, WITHOUT LONG-TERM CURRENT USE OF INSULIN (HCC): ICD-10-CM

## 2022-12-02 DIAGNOSIS — E78.5 HYPERLIPIDEMIA, UNSPECIFIED HYPERLIPIDEMIA TYPE: ICD-10-CM

## 2022-12-02 RX ORDER — LOSARTAN POTASSIUM 50 MG/1
50 TABLET ORAL DAILY
Qty: 90 TABLET | Refills: 1 | Status: SHIPPED | OUTPATIENT
Start: 2022-12-02

## 2022-12-02 RX ORDER — ROSUVASTATIN CALCIUM 5 MG/1
5 TABLET, COATED ORAL DAILY
Qty: 90 TABLET | Refills: 1 | Status: SHIPPED | OUTPATIENT
Start: 2022-12-02

## 2022-12-02 RX ORDER — TADALAFIL 20 MG/1
20 TABLET ORAL DAILY PRN
Qty: 30 TABLET | Refills: 4 | Status: SHIPPED | OUTPATIENT
Start: 2022-12-02

## 2022-12-02 RX ORDER — TADALAFIL 20 MG/1
20 TABLET ORAL DAILY PRN
Qty: 30 TABLET | Refills: 4 | Status: SHIPPED | OUTPATIENT
Start: 2022-12-02 | End: 2022-12-02 | Stop reason: SDUPTHER

## 2022-12-02 NOTE — ASSESSMENT & PLAN NOTE
Continue Crestor 5 milligram   Patient due for metabolic labs  Will get labs in 2 months and follow up thereafter

## 2022-12-02 NOTE — PROGRESS NOTES
Subjective:      Patient ID: Kath Byrne is a 41 y o  male  Hypertension  This is a chronic problem  The current episode started more than 1 year ago  The problem is controlled  Pertinent negatives include no chest pain or headaches  Risk factors for coronary artery disease include dyslipidemia, male gender and diabetes mellitus  Past treatments include angiotensin blockers  The current treatment provides significant improvement  There are no compliance problems  Hyperlipidemia  This is a chronic problem  The current episode started more than 1 year ago  The problem is controlled  Lipid results: Due for labs  Pertinent negatives include no chest pain, focal weakness or myalgias  Current antihyperlipidemic treatment includes statins  The current treatment provides significant improvement of lipids  There are no compliance problems  Diabetes  He presents for his follow-up diabetic visit  He has type 2 diabetes mellitus  Disease course: Due for labs  Pertinent negatives for hypoglycemia include no headaches  Pertinent negatives for diabetes include no chest pain  Risk factors for coronary artery disease include diabetes mellitus, dyslipidemia and hypertension  Current diabetic treatment includes diet  An ACE inhibitor/angiotensin II receptor blocker is being taken  Erectile Dysfunction  This is a chronic problem  The current episode started more than 1 year ago  The nature of his difficulty is achieving erection  Past treatments include sildenafil  The treatment provided significant relief  He has had no adverse reactions caused by medications  There are no known risk factors         Past Medical History:   Diagnosis Date   • Diverticulitis    • Hyperlipemia    • Hypertension    • Prediabetes    • Psoriasis        Family History   Problem Relation Age of Onset   • Diabetes Maternal Grandmother    • Diabetes Paternal Grandfather    • Cancer Father    • Colon cancer Neg Hx        Past Surgical History: Procedure Laterality Date   • ABDOMINAL SURGERY     • COLONOSCOPY     • GA ARTHRODESIS ANT INTERBODY INC DISCECTOMY, CERVICAL BELOW C2 Bilateral 1/26/2022    Procedure: C3/4 Anterior Cervical Discectomy and Fusion (neuromonitoring); Surgeon: Ramy Jacobs MD;  [facility];  Service: Neurosurgery   • GA COLONOSCOPY FLX DX W/COLLJ [facility] WHEN PFRMD N/A 4/3/2019    Procedure: COLONOSCOPY;  Surgeon: Idalmis Boyle MD;  [facility]; Service: Colorectal   • GA LAP,SURG,COLECTOMY, PARTIAL, W/ANAST N/A 1/8/2020    Procedure: RESECTION COLON SIGMOID LAPAROSCOPIC, LAPAROSCOPIC SPLENIC FLEXURE MOBILIZATION;  Surgeon: Idalmis Boyle MD;  [facility];  Service: Colorectal   • GA SIGMOIDOSCOPY FLX DX W/COLLJ SPEC BR/WA IF PFRMD N/A 1/8/2020    Procedure: SIGMOIDOSCOPY FLEXIBLE;  Surgeon: Idalmis Boyle MD;  [facility];  Service: Colorectal   • TENDON REPAIR      right elbow        reports that he has quit smoking  He has a 60 00 pack-year smoking history  His smokeless tobacco use includes chew  He reports current alcohol use  He reports current drug use  Drug: Marijuana        Current Outpatient Medications:   •  Cholecalciferol (VITAMIN D) 2000 units CAPS, Take 1 capsule by mouth daily, Disp: , Rfl:   •  losartan (COZAAR) 50 mg tablet, Take 1 tablet (50 mg total) by mouth daily, Disp: 90 tablet, Rfl: 1  •  metFORMIN (GLUCOPHAGE) 500 mg tablet, Take 1 tablet (500 mg total) by mouth daily with breakfast, Disp: 90 tablet, Rfl: 1  •  methocarbamol (ROBAXIN) 750 mg tablet, Take 1 tablet (750 mg total) by mouth every 6 (six) hours as needed for muscle spasms (neck pain), Disp: 30 tablet, Rfl: 3  •  Multiple Vitamins-Minerals (multivitamin with minerals) tablet, Take 1 tablet by mouth daily, Disp: , Rfl:   •  pregabalin (LYRICA) 75 mg capsule, Take 1 capsule (75 mg total) by mouth 3 (three) times a day, Disp: 90 capsule, Rfl: 3  •  rosuvastatin (CRESTOR) 5 mg tablet, Take 1 tablet (5 mg total) by mouth daily, Disp: 90 tablet, Rfl: 1  •  tadalafil (CIALIS) 20 MG tablet, Take 1 tablet (20 mg total) by mouth daily as needed for erectile dysfunction, Disp: 30 tablet, Rfl: 4  •  acetaminophen (TYLENOL) 650 mg CR tablet, Take 1 tablet (650 mg total) by mouth every 8 (eight) hours as needed for mild pain (Patient not taking: Reported on 3/10/2022), Disp: 30 tablet, Rfl: 0  •  albuterol (PROVENTIL HFA,VENTOLIN HFA) 90 mcg/act inhaler, Inhale 2 puffs every 6 (six) hours as needed for wheezing or shortness of breath (Patient not taking: Reported on 3/10/2022), Disp: 1 Inhaler, Rfl: 0  •  ALPRAZolam (XANAX) 0 25 mg tablet, Take 1 tablet (0 25 mg total) by mouth daily at bedtime as needed for anxiety (Patient not taking: Reported on 7/1/2022), Disp: 10 tablet, Rfl: 0  •  budesonide-formoterol (SYMBICORT) 80-4 5 MCG/ACT inhaler, Inhale 2 puffs 2 (two) times a day Rinse mouth after use  (Patient not taking: Reported on 3/10/2022), Disp: 1 Inhaler, Rfl: 2  •  docusate sodium (COLACE) 100 mg capsule, Take 1 capsule (100 mg total) by mouth 2 (two) times a day (Patient not taking: Reported on 3/10/2022), Disp: 20 capsule, Rfl: 0  •  Hydrocortisone Butyrate 0 1 % CREA, as needed (Patient not taking: Reported on 12/2/2022), Disp: , Rfl: 3  •  methylPREDNISolone 4 MG tablet therapy pack, Use as directed on package (Patient not taking: Reported on 3/10/2022), Disp: 21 each, Rfl: 0  •  Ustekinumab (STELARA SC), Inject under the skin every 3 (three) months (Patient not taking: Reported on 10/17/2022), Disp: , Rfl:     The following portions of the patient's history were reviewed and updated as appropriate: allergies, current medications, past family history, past medical history, past social history, past surgical history and problem list     Review of Systems   Constitutional: Negative  Respiratory: Negative  Cardiovascular: Negative  Negative for chest pain  Gastrointestinal: Negative  Musculoskeletal: Negative  Negative for myalgias  Neurological: Negative  Negative for focal weakness and headaches  Psychiatric/Behavioral: Negative  Objective:    /80   Pulse 86   Ht 5' 6" (1 676 m)   Wt 110 kg (242 lb 4 8 oz)   SpO2 98%   BMI 39 11 kg/m²      Physical Exam  Constitutional:       Appearance: He is well-developed and well-nourished  HENT:      Mouth/Throat:      Pharynx: No oropharyngeal exudate  Cardiovascular:      Rate and Rhythm: Normal rate and regular rhythm  Pulses: no weak pulses  Pulmonary:      Effort: Pulmonary effort is normal       Breath sounds: Normal breath sounds  Abdominal:      General: Bowel sounds are normal       Palpations: Abdomen is soft  Feet:      Right foot:      Skin integrity: No ulcer, skin breakdown, erythema, warmth, callus or dry skin  Left foot:      Skin integrity: No ulcer, skin breakdown, erythema, warmth, callus or dry skin  Neurological:      Mental Status: He is alert and oriented to person, place, and time  Psychiatric:         Behavior: Behavior normal          Judgment: Judgment normal          Patient's shoes and socks removed  Right Foot/Ankle   Right Foot Inspection  Skin Exam: skin normal and skin intact  No dry skin, no warmth, no callus, no erythema, no maceration, no abnormal color, no pre-ulcer, no ulcer and no callus  Toe Exam: ROM and strength within normal limits  Sensory   Vibration: intact  Proprioception: intact  Monofilament testing: intact    Vascular  Capillary refills: < 3 seconds          Left Foot/Ankle  Left Foot Inspection  Skin Exam: skin normal and skin intact  No dry skin, no warmth, no erythema, no maceration, normal color, no pre-ulcer, no ulcer and no callus  Toe Exam: ROM and strength within normal limits       Sensory   Vibration: intact  Proprioception: intact  Monofilament testing: intact    Vascular  Capillary refills: < 3 seconds        Assign Risk Category  No deformity present  No loss of protective sensation  No weak pulses  Risk: 0      No results found for this or any previous visit (from the past 1008 hour(s))  Assessment/Plan:    No problem-specific Assessment & Plan notes found for this encounter  Problem List Items Addressed This Visit        Endocrine    Controlled type 2 diabetes mellitus without complication, without long-term current use of insulin ([facility] [address] )       Lab Results   Component Value Date    HGBA1C 6 1 (H) 01/07/2022     Patient due for metabolic labs  Has gained some weight     Advised to restart metformin         Relevant Medications    metFORMIN (GLUCOPHAGE) 500 mg tablet       Cardiovascular and Mediastinum    Essential hypertension - Primary     Blood pressure is at goal   Continue losartan 50 milligram          Relevant Medications    losartan (COZAAR) 50 mg tablet       Other    Hyperlipidemia     Continue Crestor 5 milligram   Patient due for metabolic labs  Will get labs in 2 months and follow up thereafter  Relevant Medications    rosuvastatin (CRESTOR) 5 mg tablet    Erectile dysfunction     On Cialis 20 milligram   Tolerating without any side effects  Continue same  Requesting a refill            Relevant Medications    tadalafil (CIALIS) 20 MG tablet

## 2022-12-02 NOTE — ASSESSMENT & PLAN NOTE
Lab Results   Component Value Date    HGBA1C 6 1 (H) 01/07/2022     Patient due for metabolic labs  Has gained some weight      Advised to restart metformin

## 2023-01-05 ENCOUNTER — TELEMEDICINE (OUTPATIENT)
Dept: NEUROSURGERY | Facility: CLINIC | Age: 48
End: 2023-01-05

## 2023-01-05 DIAGNOSIS — Z98.890 POST-OPERATIVE STATE: ICD-10-CM

## 2023-01-05 DIAGNOSIS — G89.18 POST-OP PAIN: ICD-10-CM

## 2023-01-05 DIAGNOSIS — M48.02 CERVICAL STENOSIS OF SPINE: Primary | ICD-10-CM

## 2023-01-05 DIAGNOSIS — Z98.1 S/P CERVICAL SPINAL FUSION: ICD-10-CM

## 2023-01-05 DIAGNOSIS — M47.812 SPONDYLOSIS OF CERVICAL REGION WITHOUT MYELOPATHY OR RADICULOPATHY: ICD-10-CM

## 2023-01-05 RX ORDER — METHOCARBAMOL 750 MG/1
750 TABLET, FILM COATED ORAL EVERY 6 HOURS PRN
Qty: 30 TABLET | Refills: 3 | Status: SHIPPED | OUTPATIENT
Start: 2023-01-05
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
## 2023-01-12 ENCOUNTER — VBI (OUTPATIENT)
Dept: ADMINISTRATIVE | Facility: OTHER | Age: 48
End: 2023-01-12

## 2023-03-28 ENCOUNTER — TELEPHONE (OUTPATIENT)
Dept: PAIN MEDICINE | Facility: CLINIC | Age: 48
End: 2023-03-28

## 2023-03-28 DIAGNOSIS — M54.10 RADICULOPATHY, UNSPECIFIED SPINAL REGION: ICD-10-CM

## 2023-03-28 RX ORDER — PREGABALIN 75 MG/1
CAPSULE ORAL
Qty: 90 CAPSULE | Refills: 3 | OUTPATIENT
Start: 2023-03-28

## 2023-05-12 ENCOUNTER — TELEPHONE (OUTPATIENT)
Dept: NEUROSURGERY | Facility: CLINIC | Age: 48
End: 2023-05-12

## 2023-05-12 DIAGNOSIS — M54.10 RADICULOPATHY, UNSPECIFIED SPINAL REGION: ICD-10-CM

## 2023-05-12 RX ORDER — PREGABALIN 75 MG/1
75 CAPSULE ORAL 3 TIMES DAILY
Qty: 90 CAPSULE | Refills: 3 | Status: SHIPPED | OUTPATIENT
Start: 2023-05-12 | End: 2023-06-28 | Stop reason: SDUPTHER

## 2023-05-12 NOTE — TELEPHONE ENCOUNTER
Pt contacted Call Center requested refill of their medication  Beny Mathews patient       Medication Name: pregabalin (LYRICA) 75 mg capsule          Frequency of Med: twice a day       Remaining Medication: 1      Pharmacy and Location: [facility]/pharmacy #[id number]- [address] [address]   Phone:  [phone]  Fax:  [phone]   WILEY #:  [id number]        Pt  Preferred Callback Phone Number: [phone]      Thank you  What is next step?  Last office visit was 10/2022

## 2023-05-12 NOTE — TELEPHONE ENCOUNTER
Patient left message on nurseline requesting refill of lyrica  Informed patient that we are not the prescrriber of this medication and he would need to contact the prescriber for any refills  All questions answered at this time, he was appreciative of the call back

## 2023-05-12 NOTE — TELEPHONE ENCOUNTER
S/w pt, requesting refill of Lyrica  Confirmed he takes medication BID (script is written for TID; however, pt states he does not typically remember to take the last dose of the day)  States medication is helpful depending on activity level, denies s/e  He does not have any medication remaining, uses [facility] on file  F/u OV scheduled for first available on 6/28

## 2023-05-31 DIAGNOSIS — E78.5 HYPERLIPIDEMIA, UNSPECIFIED HYPERLIPIDEMIA TYPE: Primary | ICD-10-CM

## 2023-05-31 DIAGNOSIS — E11.9 CONTROLLED TYPE 2 DIABETES MELLITUS WITHOUT COMPLICATION, WITHOUT LONG-TERM CURRENT USE OF INSULIN (HCC): ICD-10-CM

## 2023-05-31 NOTE — PROGRESS NOTES
Subjective:      Patient ID: Zaira Chang is a 45 y o  male  Hypertension   This is a chronic problem  The current episode started more than 1 year ago  The problem is controlled  Pertinent negatives include no blurred vision, chest pain, headaches, palpitations, peripheral edema or shortness of breath  Risk factors for coronary artery disease include dyslipidemia, male gender and obesity  Treatments tried: Losartan 50 milligram  The current treatment provides significant improvement  There are no compliance problems  Hyperlipidemia   This is a chronic problem  The current episode started more than 1 year ago  The problem is controlled  Recent lipid tests were reviewed and are normal  Pertinent negatives include no chest pain, focal weakness, leg pain, myalgias or shortness of breath  Current antihyperlipidemic treatment includes statins  The current treatment provides significant improvement of lipids  There are no compliance problems  Risk factors for coronary artery disease include dyslipidemia, hypertension, male sex and obesity  Patient has pre diabetes, fasting blood sugar and A1c stable  Patient has make changes to his diet  Patient has COPD, breathing stable with use of Symbicort and intermittent use of albuterol  Patient is reporting symptoms of numbness which radiated down from elbow to his arms, states symptoms are temporary and occur intermittently when he looks up or moves his neck  Patient states that symptoms are temporary  Denies any weakness in his arms or legs  Symptoms have been going on for the past 2 to 3 months  Has history of diverticulitis, follows up with Colorectal   No acute symptoms today    Past Medical History:   Diagnosis Date    Diverticulitis     Hyperlipemia     Hypertension     Prediabetes     Psoriasis        Family History   Problem Relation Age of Onset    Diabetes Maternal Grandmother     Diabetes Paternal Grandfather        Past Surgical History: Procedure Laterality Date    [place] COLONOSCOPY FLX DX W/COLLJ [facility] WHEN PFRMD N/A 4/3/2019    Procedure: COLONOSCOPY;  Surgeon: Antony Zambrano MD;  [facility]: [facility]  GI LAB; Service: Colorectal        reports that he has been smoking cigarettes  He has a 50 00 pack-year smoking history  His smokeless tobacco use includes chew  He reports that he does not drink alcohol or use drugs  Current Outpatient Medications:     albuterol (PROVENTIL HFA,VENTOLIN HFA) 90 mcg/act inhaler, Inhale 2 puffs every 6 (six) hours as needed for wheezing or shortness of breath, Disp: 1 Inhaler, Rfl: 0    budesonide-formoterol (SYMBICORT) 80-4 5 MCG/ACT inhaler, Inhale 2 puffs 2 (two) times a day Rinse mouth after use , Disp: 1 Inhaler, Rfl: 2    Cholecalciferol (VITAMIN D) 2000 units CAPS, Take 1 capsule by mouth daily, Disp: , Rfl:     Hydrocortisone Butyrate 0 1 % CREA, APPLY TO SKIN DAILY, Disp: , Rfl: 3    losartan (COZAAR) 50 mg tablet, Take 1 tablet (50 mg total) by mouth daily, Disp: 90 tablet, Rfl: 1    Multiple Vitamin (MULTIVITAMIN) capsule, Take 1 capsule by mouth daily, Disp: , Rfl:     rosuvastatin (CRESTOR) 5 mg tablet, Take 1 tablet (5 mg total) by mouth daily, Disp: 90 tablet, Rfl: 1    Ustekinumab (STELARA SC), Inject under the skin, Disp: , Rfl:     The following portions of the patient's history were reviewed and updated as appropriate: allergies, current medications, past family history, past medical history, past social history, past surgical history and problem list     Review of Systems   Constitutional: Negative  Eyes: Negative for blurred vision  Respiratory: Negative  Negative for shortness of breath  Cardiovascular: Negative  Negative for chest pain and palpitations  Gastrointestinal: Negative  Endocrine: Negative  Genitourinary: Negative  Musculoskeletal: Negative  Negative for myalgias  Allergic/Immunologic: Negative  Neurological: Positive for numbness (Arms)  Negative for focal weakness and headaches  Psychiatric/Behavioral: Negative  Objective:    /80 (BP Location: Left arm, Patient Position: Sitting, Cuff Size: Large)   Pulse 74   Ht 5' 8" (1 727 m)   Wt 99 3 kg (219 lb)   SpO2 96%   BMI 33 30 kg/m²      Physical Exam   Constitutional: He is oriented to person, place, and time  He appears well-developed and well-nourished  HENT:   Mouth/Throat: Oropharynx is clear and moist  No oropharyngeal exudate  Eyes: Pupils are equal, round, and reactive to light  Neck: Normal range of motion  Cardiovascular: Normal rate and regular rhythm  Pulmonary/Chest: Effort normal and breath sounds normal    Abdominal: Soft  Bowel sounds are normal    Musculoskeletal: Normal range of motion  He exhibits no edema or tenderness  Neurological: He is alert and oriented to person, place, and time  He displays normal reflexes  No cranial nerve deficit or sensory deficit  He exhibits normal muscle tone   Coordination normal    LE/LE strength 5/5     Psychiatric: His behavior is normal  Judgment normal          Recent Results (from the past 1008 hour(s))   Lipid panel    Collection Time: 12/09/19  7:07 AM   Result Value Ref Range    Total Cholesterol 133 <200 mg/dL    HDL 32 (L) >40 mg/dL    Triglycerides 117 <150 mg/dL    LDL Direct 80 mg/dL (calc)    Chol HDLC Ratio 4 2 <5 0 (calc)    Non-HDL Cholesterol 101 <130 mg/dL (calc)   Comprehensive metabolic panel    Collection Time: 12/09/19  7:07 AM   Result Value Ref Range    Glucose, Random 105 (H) 65 - 99 mg/dL    BUN 12 7 - 25 mg/dL    Creatinine 1 07 0 60 - 1 35 mg/dL    eGFR Non  85 > OR = 60 mL/min/1 73m2    eGFR  99 > OR = 60 mL/min/1 73m2    SL AMB BUN/CREATININE RATIO NOT APPLICABLE 6 - 22 (calc)    Sodium 139 135 - 146 mmol/L    Potassium 4 6 3 5 - 5 3 mmol/L    Chloride 103 98 - 110 mmol/L    CO2 28 20 - 32 mmol/L    SL AMB CALCIUM 9 4 8 6 - 10 3 mg/dL    Protein, Total 7 0 6 1 - 8 1 g/dL    Albumin 4 3 3 6 - 5 1 g/dL    Globulin 2 7 1 9 - 3 7 g/dL (calc)    Albumin/Globulin Ratio 1 6 1 0 - 2 5 (calc)    TOTAL BILIRUBIN 0 3 0 2 - 1 2 mg/dL    Alkaline Phosphatase 72 40 - 115 U/L    AST 20 10 - 40 U/L    ALT 36 9 - 46 U/L   Microalbumin, Random Urine (W/Creatinine)    Collection Time: 12/09/19  7:07 AM   Result Value Ref Range    Creatinine, Urine 141 20 - 320 mg/dL    Microalbum  ,U,Random 0 4 See Note: mg/dL    Microalb/Creat Ratio 3 <30 mcg/mg creat    Comment(s)     Hemoglobin A1c (w/out EAG)    Collection Time: 12/09/19  7:07 AM   Result Value Ref Range    Hemoglobin A1C 5 9 (H) <5 7 % of total Hgb       Assessment/Plan:         Problem List Items Addressed This Visit        Cardiovascular and Mediastinum    Essential hypertension     Stable on losartan 50 milligram   Continue same  Relevant Medications    losartan (COZAAR) 50 mg tablet    Other Relevant Orders    Comprehensive metabolic panel       Other    Prediabetes - Primary     A1c stable in the pre diabetes range with diet modifications  Continue same  Relevant Orders    Hemoglobin A1C    Smoker      Patient is currently smoking but wants to quit  Educate on the negative effects of Tobacco   Recommend quitting smoking  Listed cessation options,  Including smoking cessation program    Patient wants to  Make an effort on his own by reducing the number of cigarettes  If he is unable to do so then he will follow up again to discuss treatment options  Relevant Medications    albuterol (PROVENTIL HFA,VENTOLIN HFA) 90 mcg/act inhaler    budesonide-formoterol (SYMBICORT) 80-4 5 MCG/ACT inhaler    Hyperlipidemia     Continue statin  Metabolic labs at 6 month interval and follow up thereafter  Relevant Medications    rosuvastatin (CRESTOR) 5 mg tablet    Other Relevant Orders    Lipid panel    Numbness of arm     New onset  Patient advised to go for x-ray of the neck    If normal would recommend physical therapy           Relevant Orders    XR spine cervical complete 4 or 5 vw non injury      Other Visit Diagnoses     Moderate persistent asthma with acute exacerbation        Relevant Medications    albuterol (PROVENTIL HFA,VENTOLIN HFA) 90 mcg/act inhaler    budesonide-formoterol (SYMBICORT) 80-4 5 MCG/ACT inhaler    Bronchitis        Relevant Medications    albuterol (PROVENTIL HFA,VENTOLIN HFA) 90 mcg/act inhaler    budesonide-formoterol (SYMBICORT) 80-4 5 MCG/ACT inhaler Birth Control Pills Pregnancy And Lactation Text: This medication should be avoided if pregnant and for the first 30 days post-partum.

## 2023-06-28 ENCOUNTER — OFFICE VISIT (OUTPATIENT)
Dept: PAIN MEDICINE | Facility: CLINIC | Age: 48
End: 2023-06-28
Payer: COMMERCIAL

## 2023-06-28 VITALS
WEIGHT: 225 LBS | RESPIRATION RATE: 16 BRPM | SYSTOLIC BLOOD PRESSURE: 126 MMHG | DIASTOLIC BLOOD PRESSURE: 73 MMHG | HEIGHT: 66 IN | BODY MASS INDEX: 36.16 KG/M2 | HEART RATE: 63 BPM

## 2023-06-28 DIAGNOSIS — G89.4 CHRONIC PAIN SYNDROME: Primary | ICD-10-CM

## 2023-06-28 DIAGNOSIS — M79.18 MYOFASCIAL PAIN SYNDROME: ICD-10-CM

## 2023-06-28 DIAGNOSIS — M54.10 RADICULOPATHY, UNSPECIFIED SPINAL REGION: ICD-10-CM

## 2023-06-28 DIAGNOSIS — M54.12 CERVICAL RADICULOPATHY: ICD-10-CM

## 2023-06-28 LAB
ALBUMIN SERPL-MCNC: 4.4 G/DL (ref 3.6–5.1)
ALBUMIN/GLOB SERPL: 1.8 (CALC) (ref 1–2.5)
ALP SERPL-CCNC: 87 U/L (ref 36–130)
ALT SERPL-CCNC: 31 U/L (ref 9–46)
AST SERPL-CCNC: 18 U/L (ref 10–40)
BILIRUB SERPL-MCNC: 0.3 MG/DL (ref 0.2–1.2)
BUN SERPL-MCNC: 16 MG/DL (ref 7–25)
BUN/CREAT SERPL: ABNORMAL (CALC) (ref 6–22)
CALCIUM SERPL-MCNC: 9.5 MG/DL (ref 8.6–10.3)
CHLORIDE SERPL-SCNC: 106 MMOL/L (ref 98–110)
CHOLEST SERPL-MCNC: 143 MG/DL
CHOLEST/HDLC SERPL: 3.8 (CALC)
CO2 SERPL-SCNC: 28 MMOL/L (ref 20–32)
CREAT SERPL-MCNC: 0.91 MG/DL (ref 0.6–1.29)
GFR/BSA.PRED SERPLBLD CYS-BASED-ARV: 105 ML/MIN/1.73M2
GLOBULIN SER CALC-MCNC: 2.4 G/DL (CALC) (ref 1.9–3.7)
GLUCOSE SERPL-MCNC: 101 MG/DL (ref 65–99)
HBA1C MFR BLD: 5.9 % OF TOTAL HGB
HDLC SERPL-MCNC: 38 MG/DL
LDLC SERPL CALC-MCNC: 80 MG/DL (CALC)
NONHDLC SERPL-MCNC: 105 MG/DL (CALC)
POTASSIUM SERPL-SCNC: 4.4 MMOL/L (ref 3.5–5.3)
PROT SERPL-MCNC: 6.8 G/DL (ref 6.1–8.1)
SODIUM SERPL-SCNC: 141 MMOL/L (ref 135–146)
TRIGL SERPL-MCNC: 148 MG/DL

## 2023-06-28 RX ORDER — METHYLPREDNISOLONE 4 MG/1
TABLET ORAL
Qty: 1 EACH | Refills: 0 | Status: SHIPPED | OUTPATIENT
Start: 2023-06-28

## 2023-06-28 RX ORDER — PREGABALIN 75 MG/1
CAPSULE ORAL
Qty: 120 CAPSULE | Refills: 3 | Status: SHIPPED | OUTPATIENT
Start: 2023-06-28

## 2023-06-28 NOTE — PROGRESS NOTES
Assessment:  1  Chronic pain syndrome    2  Cervical radiculopathy    3  Myofascial pain syndrome    4  Radiculopathy, unspecified spinal region        Plan:  The patient is a 16-year-old male with a history of chronic pain secondary to neck pain, cervical disc disorder with radiculopathy who is status post C3-4 ACDF done on 01/26/2022 who presents to the office with worsening bilateral neck pain and pain that radiates into bilateral upper extremities that has worsened since his last office visit and worsened since renovating a 1300 spoke with patient  At this time, I will order an MRI of his cervical spine to evaluate for any pathology causing his worsening neck pain with radiculopathy  The patient is status post fusion done in January 2022  I instructed patient I will call once I receive the results  I will also increase his Lyrica 75mg to 2 tablets in the morning and 2 tablets in the evening with hopes to provide relief of his pain  For now, I will provide him with a Medrol Dosepak to complete, as the patient is likely experiencing an increase in inflammation while renovating a basement  I instructed patient to follow directions on the package and avoid NSAIDs  My impressions and treatment recommendations were discussed in detail with the patient who verbalized understanding and had no further questions  Discharge instructions were provided  I personally saw and examined the patient and I agree with the above discussed plan of care  Orders Placed This Encounter   Procedures   • MRI cervical spine with and without contrast     Standing Status:   Future     Standing Expiration Date:   6/28/2027     Scheduling Instructions: There is no preparation for this test  Please leave your jewelry and valuables at home, wedding rings are the exception  All patients will be required to change into a hospital gown and pants  Street clothes are not permitted in the MRI    Magnetic nail polish must be removed prior to arrival for your test  Please bring your insurance cards, a form of photo ID and a list of your medications with you  Arrive 15 minutes prior to your appointment time in order to register  Please bring any prior CT or MRI studies of this area that were not performed at a [facility] facility  To schedule this appointment, please contact Central Scheduling at [phone]  Prior to your appointment, please make sure you complete the MRI Screening Form when you e-Check in for your appointment  This will be available starting 7 days before your appointment in [facility]  You may receive an e-mail with an activation code if you do not have a [employer] account  If you do not have access to a device, we will complete your screening at your appointment  Order Specific Question:   What is the patient's sedation requirement? If Medication for Claustrophobia is selected, order medication at this point  Answer:   No Sedation     Order Specific Question:   Does this procedure require the 3T MRI at [facility] or [facility]?     Answer:   No     Order Specific Question:   Release to patient through [facility]     Answer:   Immediate     Order Specific Question:   Is order priority selected as STAT? Answer:   No     Order Specific Question:   Reason for Exam (FREE TEXT)     Answer:   CERVICAL RADICULOPATHY, S/P CERVICAL FUSION     New Medications Ordered This Visit   Medications   • methylPREDNISolone 4 MG tablet therapy pack     Sig: Use as directed on package     Dispense:  1 each     Refill:  0   • pregabalin (LYRICA) 75 mg capsule     Sig: Take 2 tablets in the morning and 2 tablets in the evening     Dispense:  120 capsule     Refill:  3       History of Present Illness:  Narciso Collet is a 57 y o  male with a history of chronic pain secondary to neck pain, cervical disc disorder with radiculopathy who is status post C3-4 ACDF done on 01/26/2022    He was last seen on 10/17/2022 where a cervical epidural steroid injection was recommended, however patient did not want to move forward as he has a fear of needles  At that last office visit his Lyrica was also increased to 3 times a day  He presents to the office with worsening bilateral neck pain and pain that radiates into bilateral upper extremities  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 and has noticed his neck pain has increased  He states his pain is worse since the last office visit and constant  He rates the quality of his pain as dull/aching, sharp, cramping and stabbing and is currently rating his pain a 6/10 on a numeric scale  Current pain medications include Lyrica 75 mg 1 tablet 3 times a day, methocarbamol 750 mg as needed for muscle spasms  He states this medication regimen is providing him little relief of his pain  I have personally reviewed and/or updated the patient's past medical history, past surgical history, family history, social history, current medications, allergies, and vital signs today  Review of Systems   Respiratory: Negative for shortness of breath  Cardiovascular: Negative for chest pain  Gastrointestinal: Negative for constipation, diarrhea, nausea and vomiting  Musculoskeletal: Positive for joint swelling, neck pain and neck stiffness  Negative for arthralgias, gait problem and myalgias  Skin: Negative for rash  Neurological: Negative for dizziness, seizures and weakness  All other systems reviewed and are negative        Patient Active Problem List   Diagnosis   • Essential hypertension   • Lateral epicondylitis of right elbow   • Psoriasis   • Hyperlipidemia   • Moderate persistent asthma without complication   • Diverticulitis large intestine w/o perforation or abscess w/o bleeding   • Need for prophylactic vaccination and inoculation against influenza   • Status post colon resection   • Controlled type 2 diabetes mellitus without complication, without long-term current use of insulin (HCC)   • Left foot pain   • Sleep apnea   • Cervicalgia   • Radiculopathy   • Chronic pain syndrome   • Cervical disc disorder with radiculopathy of mid-cervical region   • Pre-operative clearance   • Cervical cord compression with myelopathy [facility])   • Erectile dysfunction   • Anxiety       Past Medical History:   Diagnosis Date   • Diverticulitis    • Hyperlipemia    • Hypertension    • Prediabetes    • Psoriasis        Past Surgical History:   Procedure Laterality Date   • ABDOMINAL SURGERY     • COLONOSCOPY     • IL ARTHRD ANT INTERBODY DECOMPRESS CERVICAL BELW C2 Bilateral 1/26/2022    Procedure: C3/4 Anterior Cervical Discectomy and Fusion (neuromonitoring); Surgeon: Romain Stock MD;  Location: [facility] Main OR;  Service: Neurosurgery   • IL COLONOSCOPY FLX DX W/COLLJ [facility] REHABILITATION WHEN PFRMD N/A 4/3/2019    Procedure: COLONOSCOPY;  Surgeon: Evelyn Medeiros MD;  Location: [facility]  GI LAB;   Service: Colorectal   • IL LAPAROSCOPY COLECTOMY PARTIAL W/ANASTOMOSIS N/A 1/8/2020    Procedure: RESECTION COLON SIGMOID LAPAROSCOPIC, LAPAROSCOPIC SPLENIC FLEXURE MOBILIZATION;  Surgeon: Evelyn Medeiros MD;  Location: [facility] MAIN OR;  Service: Colorectal   • IL SIGMOIDOSCOPY FLX DX W/COLLJ SPEC BR/WA IF PFRMD N/A 1/8/2020    Procedure: Varsha Lee;  Surgeon: Evelyn Medeiros MD;  Location: [facility] MAIN OR;  Service: Colorectal   • TENDON REPAIR      right elbow       Family History   Problem Relation Age of Onset   • Diabetes Maternal Grandmother    • Diabetes Paternal Grandfather    • Cancer Father    • Colon cancer Neg Hx        Social History     Occupational History   • Not on file   Tobacco Use   • Smoking status: Former     Packs/day: 2 00     Years: 30 00     Total pack years: 60 00     Types: Cigarettes   • Smokeless tobacco: Current     Types: Chew   • Tobacco comments:     Quit 20 months ago   Vaping Use   • Vaping Use: Never used   Substance and Sexual Activity   • Alcohol use: Yes     Comment: occasional alcohol use   • Drug use: Yes     Types: Marijuana     Comment: every couple weeks   • Sexual activity: Not Currently       Current Outpatient Medications on File Prior to Visit   Medication Sig   • Cholecalciferol (VITAMIN D) 2000 units CAPS Take 1 capsule by mouth daily   • losartan (COZAAR) 50 mg tablet TAKE 1 TABLET BY MOUTH EVERY DAY   • metFORMIN (GLUCOPHAGE) 500 mg tablet TAKE 1 TABLET BY MOUTH EVERY DAY WITH BREAKFAST   • methocarbamol (ROBAXIN) 750 mg tablet Take 1 tablet (750 mg total) by mouth every 6 (six) hours as needed for muscle spasms (neck pain)   • rosuvastatin (CRESTOR) 5 mg tablet TAKE 1 TABLET (5 MG TOTAL) BY MOUTH DAILY  • tadalafil (CIALIS) 20 MG tablet Take 1 tablet (20 mg total) by mouth daily as needed for erectile dysfunction   • [DISCONTINUED] pregabalin (LYRICA) 75 mg capsule Take 1 capsule (75 mg total) by mouth 3 (three) times a day   • acetaminophen (TYLENOL) 650 mg CR tablet Take 1 tablet (650 mg total) by mouth every 8 (eight) hours as needed for mild pain (Patient not taking: Reported on 1/5/2023)   • albuterol (PROVENTIL HFA,VENTOLIN HFA) 90 mcg/act inhaler Inhale 2 puffs every 6 (six) hours as needed for wheezing or shortness of breath (Patient not taking: Reported on 3/10/2022)   • ALPRAZolam (XANAX) 0 25 mg tablet Take 1 tablet (0 25 mg total) by mouth daily at bedtime as needed for anxiety (Patient not taking: Reported on 7/1/2022)   • budesonide-formoterol (SYMBICORT) 80-4 5 MCG/ACT inhaler Inhale 2 puffs 2 (two) times a day Rinse mouth after use   (Patient not taking: Reported on 3/10/2022)   • docusate sodium (COLACE) 100 mg capsule Take 1 capsule (100 mg total) by mouth 2 (two) times a day (Patient not taking: Reported on 3/10/2022)   • Hydrocortisone Butyrate 0 1 % CREA as needed (Patient not taking: Reported on 12/2/2022)   • methylPREDNISolone 4 MG tablet therapy pack Use as directed on package (Patient not taking: Reported on 3/10/2022) "  • Multiple Vitamins-Minerals (multivitamin with minerals) tablet Take 1 tablet by mouth daily (Patient not taking: Reported on 1/5/2023)   • Ustekinumab (STELARA SC) Inject under the skin every 3 (three) months (Patient not taking: Reported on 6/28/2023)     No current facility-administered medications on file prior to visit  No Known Allergies    Physical Exam:    /73   Pulse 63   Resp 16   Ht 5' 6\" (1 676 m)   Wt 102 kg (225 lb)   BMI 36 32 kg/m²     Constitutional:normal, well developed, well nourished, alert, in no distress and non-toxic and no overt pain behavior    Eyes:anicteric  HEENT:grossly intact  Neck:supple, symmetric, trachea midline and no masses   Pulmonary:even and unlabored  Cardiovascular:No edema or pitting edema present  Skin:Normal without rashes or lesions and well hydrated  Psychiatric:Mood and affect appropriate  Neurologic:Cranial Nerves II-XII grossly intact  Musculoskeletal:normal     Cervical Spine Exam    Appearance:  Normal lordosis  Palpation/Tenderness:  no tenderness or spasm  Sensory:  no sensory deficits noted  Range of Motion:  Flexion:  Minimally limited  with pain  Extension:  Minimally limited  with pain  Rotation - Left:  Minimally limited  with pain  Rotation - Right:  Minimally limited  with pain  Motor Strength:  Left Arm Flexion  5/5  Left Arm Extension  5/5  Right Arm Flexion  5/5  Right Arm Extension  5/5  Left    5/5  Right   5/5      Imaging    "

## 2023-06-29 ENCOUNTER — OFFICE VISIT (OUTPATIENT)
Dept: FAMILY MEDICINE CLINIC | Facility: CLINIC | Age: 48
End: 2023-06-29
Payer: COMMERCIAL

## 2023-06-29 VITALS
SYSTOLIC BLOOD PRESSURE: 130 MMHG | WEIGHT: 222 LBS | HEART RATE: 73 BPM | DIASTOLIC BLOOD PRESSURE: 80 MMHG | BODY MASS INDEX: 35.68 KG/M2 | HEIGHT: 66 IN | OXYGEN SATURATION: 98 %

## 2023-06-29 DIAGNOSIS — E78.5 HYPERLIPIDEMIA, UNSPECIFIED HYPERLIPIDEMIA TYPE: ICD-10-CM

## 2023-06-29 DIAGNOSIS — I10 ESSENTIAL HYPERTENSION: ICD-10-CM

## 2023-06-29 DIAGNOSIS — N52.9 ERECTILE DYSFUNCTION, UNSPECIFIED ERECTILE DYSFUNCTION TYPE: ICD-10-CM

## 2023-06-29 DIAGNOSIS — E11.9 CONTROLLED TYPE 2 DIABETES MELLITUS WITHOUT COMPLICATION, WITHOUT LONG-TERM CURRENT USE OF INSULIN (HCC): Primary | ICD-10-CM

## 2023-06-29 DIAGNOSIS — R53.83 OTHER FATIGUE: ICD-10-CM

## 2023-06-29 PROCEDURE — 99214 OFFICE O/P EST MOD 30 MIN: CPT | Performed by: FAMILY MEDICINE

## 2023-06-29 RX ORDER — ROSUVASTATIN CALCIUM 5 MG/1
5 TABLET, COATED ORAL DAILY
Qty: 90 TABLET | Refills: 1 | Status: SHIPPED | OUTPATIENT
Start: 2023-06-29

## 2023-06-29 RX ORDER — TADALAFIL 20 MG/1
20 TABLET ORAL DAILY PRN
Qty: 30 TABLET | Refills: 4 | Status: SHIPPED | OUTPATIENT
Start: 2023-06-29

## 2023-06-29 RX ORDER — TADALAFIL 20 MG/1
20 TABLET ORAL DAILY PRN
Qty: 30 TABLET | Refills: 4 | Status: SHIPPED | OUTPATIENT
Start: 2023-06-29 | End: 2023-06-29 | Stop reason: SDUPTHER

## 2023-06-29 RX ORDER — LOSARTAN POTASSIUM 50 MG/1
50 TABLET ORAL DAILY
Qty: 90 TABLET | Refills: 1 | Status: SHIPPED | OUTPATIENT
Start: 2023-06-29

## 2023-06-29 NOTE — ASSESSMENT & PLAN NOTE
Patient requesting to be screened for testosterone due to persisting fatigue  Has been trying to eat healthy and lose weight  Patient states that he is interested in starting testosterone replacement therapy if his levels  are low    He will be referred to urologist to discuss treatment

## 2023-06-29 NOTE — ASSESSMENT & PLAN NOTE
Lab Results   Component Value Date    HGBA1C 5 9 (H) 06/27/2023     Patient is currently on metformin 500 mg daily  Tolerating well without any symptoms of hypoglycemia  Advised to continue for now    Recommended metabolic labs/follow-up at 6-month interval

## 2023-06-29 NOTE — ASSESSMENT & PLAN NOTE
On Cialis 20 milligram   Tolerating without any side effects  Continue same  Requesting a refill  Patient requesting to be screened for testosterone deficiency due to persisting fatigue

## 2023-06-29 NOTE — PROGRESS NOTES
Subjective:      Patient ID: Rohini Husbands is a 57 y o  male  HPI    Patient is following up on his chronic medical problems  Has history of type 2 diabetes, hypertension, dyslipidemia  Patient has changed his diet, has lost a significant amount of weight  His A1c is 5 9  Denies any symptoms of hypoglycemia  Patient is interested in starting testosterone replacement therapy, requesting testosterone levels today  Past Medical History:   Diagnosis Date   • Diverticulitis    • Hyperlipemia    • Hypertension    • Prediabetes    • Psoriasis        Family History   Problem Relation Age of Onset   • Diabetes Maternal Grandmother    • Diabetes Paternal Grandfather    • Cancer Father    • Colon cancer Neg Hx        Past Surgical History:   Procedure Laterality Date   • ABDOMINAL SURGERY     • COLONOSCOPY     • IL ARTHRD ANT INTERBODY DECOMPRESS CERVICAL BELW C2 Bilateral 1/26/2022    Procedure: C3/4 Anterior Cervical Discectomy and Fusion (neuromonitoring); Surgeon: Dustin Dominguez MD;  Location[facility] Main OR;  Service: Neurosurgery   • IL COLONOSCOPY FLX DX W/COLLJ [facility] REHABILITATION WHEN PFRMD N/A 4/3/2019    Procedure: COLONOSCOPY;  Surgeon: Vicki Decker MD;  Location: [facility]  GI LAB; Service: Colorectal   • IL LAPAROSCOPY COLECTOMY PARTIAL W/ANASTOMOSIS N/A 1/8/2020    Procedure: RESECTION COLON SIGMOID LAPAROSCOPIC, LAPAROSCOPIC SPLENIC FLEXURE MOBILIZATION;  Surgeon: Vicki Decker MD;  Location[facility];  Service: Colorectal   • IL SIGMOIDOSCOPY FLX DX W/COLLJ SPEC BR/WA IF PFRMD N/A 1/8/2020    Procedure: SIGMOIDOSCOPY FLEXIBLE;  Surgeon: Vicki Decker MD;  [facility];  Service: Colorectal   • TENDON REPAIR      right elbow        reports that he has quit smoking  His smoking use included cigarettes  He has a 60 00 pack-year smoking history  His smokeless tobacco use includes chew  He reports current alcohol use  He reports current drug use  Drug: Marijuana        Current Outpatient Medications:   •  Cholecalciferol (VITAMIN D) 2000 units CAPS, Take 1 capsule by mouth daily, Disp: , Rfl:   •  losartan (COZAAR) 50 mg tablet, Take 1 tablet (50 mg total) by mouth daily, Disp: 90 tablet, Rfl: 1  •  metFORMIN (GLUCOPHAGE) 500 mg tablet, Take 1 tablet (500 mg total) by mouth daily with breakfast, Disp: 90 tablet, Rfl: 1  •  methocarbamol (ROBAXIN) 750 mg tablet, Take 1 tablet (750 mg total) by mouth every 6 (six) hours as needed for muscle spasms (neck pain), Disp: 30 tablet, Rfl: 3  •  pregabalin (LYRICA) 75 mg capsule, Take 2 tablets in the morning and 2 tablets in the evening, Disp: 120 capsule, Rfl: 3  •  rosuvastatin (CRESTOR) 5 mg tablet, Take 1 tablet (5 mg total) by mouth daily, Disp: 90 tablet, Rfl: 1  •  tadalafil (CIALIS) 20 MG tablet, Take 1 tablet (20 mg total) by mouth daily as needed for erectile dysfunction, Disp: 30 tablet, Rfl: 4  •  acetaminophen (TYLENOL) 650 mg CR tablet, Take 1 tablet (650 mg total) by mouth every 8 (eight) hours as needed for mild pain (Patient not taking: Reported on 1/5/2023), Disp: 30 tablet, Rfl: 0  •  albuterol (PROVENTIL HFA,VENTOLIN HFA) 90 mcg/act inhaler, Inhale 2 puffs every 6 (six) hours as needed for wheezing or shortness of breath (Patient not taking: Reported on 3/10/2022), Disp: 1 Inhaler, Rfl: 0  •  ALPRAZolam (XANAX) 0 25 mg tablet, Take 1 tablet (0 25 mg total) by mouth daily at bedtime as needed for anxiety (Patient not taking: Reported on 7/1/2022), Disp: 10 tablet, Rfl: 0  •  budesonide-formoterol (SYMBICORT) 80-4 5 MCG/ACT inhaler, Inhale 2 puffs 2 (two) times a day Rinse mouth after use   (Patient not taking: Reported on 3/10/2022), Disp: 1 Inhaler, Rfl: 2  •  docusate sodium (COLACE) 100 mg capsule, Take 1 capsule (100 mg total) by mouth 2 (two) times a day (Patient not taking: Reported on 3/10/2022), Disp: 20 capsule, Rfl: 0  •  Hydrocortisone Butyrate 0 1 % CREA, as needed (Patient not taking: Reported on 12/2/2022), Disp: , Rfl: 3  • "methylPREDNISolone 4 MG tablet therapy pack, Use as directed on package (Patient not taking: Reported on 3/10/2022), Disp: 21 each, Rfl: 0  •  methylPREDNISolone 4 MG tablet therapy pack, Use as directed on package (Patient not taking: Reported on 6/29/2023), Disp: 1 each, Rfl: 0  •  Multiple Vitamins-Minerals (multivitamin with minerals) tablet, Take 1 tablet by mouth daily (Patient not taking: Reported on 1/5/2023), Disp: , Rfl:   •  Ustekinumab ([employer] Pouch SC), Inject under the skin every 3 (three) months (Patient not taking: Reported on 6/28/2023), Disp: , Rfl:     The following portions of the patient's history were reviewed and updated as appropriate: allergies, current medications, past family history, past medical history, past social history, past surgical history and problem list     Review of Systems   Constitutional: Positive for fatigue  Respiratory: Negative  Cardiovascular: Negative  Objective:    /80   Pulse 73   Ht 5' 6\" (1 676 m)   Wt 101 kg (222 lb)   SpO2 98%   BMI 35 83 kg/m²      Physical Exam  Constitutional:       Appearance: Normal appearance  Cardiovascular:      Heart sounds: Normal heart sounds  Pulmonary:      Breath sounds: Normal breath sounds  Musculoskeletal:      Right lower leg: No edema  Left lower leg: No edema  Neurological:      Mental Status: He is oriented to person, place, and time     Psychiatric:         Mood and Affect: Mood normal            Recent Results (from the past 1008 hour(s))   Lipid panel    Collection Time: 06/27/23  9:29 AM   Result Value Ref Range    Total Cholesterol 143 <200 mg/dL    HDL 38 (L) > OR = 40 mg/dL    Triglycerides 148 <150 mg/dL    LDL Calculated 80 mg/dL (calc)    Chol HDLC Ratio 3 8 <5 0 (calc)    Non-HDL Cholesterol 105 <130 mg/dL (calc)   Comprehensive metabolic panel    Collection Time: 06/27/23  9:29 AM   Result Value Ref Range    Glucose, Random 101 (H) 65 - 99 mg/dL    BUN 16 7 - 25 mg/dL    " Creatinine 0 91 0 60 - 1 29 mg/dL    eGFR 105 > OR = 60 mL/min/1 73m2    SL AMB BUN/CREATININE RATIO NOT APPLICABLE 6 - 22 (calc)    Sodium 141 135 - 146 mmol/L    Potassium 4 4 3 5 - 5 3 mmol/L    Chloride 106 98 - 110 mmol/L    CO2 28 20 - 32 mmol/L    Calcium 9 5 8 6 - 10 3 mg/dL    Protein, Total 6 8 6 1 - 8 1 g/dL    Albumin 4 4 3 6 - 5 1 g/dL    Globulin 2 4 1 9 - 3 7 g/dL (calc)    Albumin/Globulin Ratio 1 8 1 0 - 2 5 (calc)    TOTAL BILIRUBIN 0 3 0 2 - 1 2 mg/dL    Alkaline Phosphatase 87 36 - 130 U/L    AST 18 10 - 40 U/L    ALT 31 9 - 46 U/L   Hemoglobin A1c (w/out EAG)    Collection Time: 06/27/23  9:29 AM   Result Value Ref Range    Hemoglobin A1C 5 9 (H) <5 7 % of total Hgb       Assessment/Plan:    No problem-specific Assessment & Plan notes found for this encounter  Problem List Items Addressed This Visit        Endocrine    Controlled type 2 diabetes mellitus without complication, without long-term current use of insulin ([facility] ) - Primary       Lab Results   Component Value Date    HGBA1C 5 9 (H) 06/27/2023     Patient is currently on metformin 500 mg daily  Tolerating well without any symptoms of hypoglycemia  Advised to continue for now  Recommended metabolic labs/follow-up at 6-month interval          Relevant Medications    metFORMIN (GLUCOPHAGE) 500 mg tablet    Other Relevant Orders    Albumin / creatinine urine ratio    Comprehensive metabolic panel    Hemoglobin A1C       Cardiovascular and Mediastinum    Essential hypertension     Blood pressure is at goal   Continue losartan 50 milligram          Relevant Medications    losartan (COZAAR) 50 mg tablet       Other    Hyperlipidemia     Cholesterol panel improved significantly  Continue Crestor 5 milligram         Relevant Medications    rosuvastatin (CRESTOR) 5 mg tablet    Other Relevant Orders    Lipid panel    Erectile dysfunction     On Cialis 20 milligram   Tolerating without any side effects  Continue same     Requesting a refill  Patient requesting to be screened for testosterone deficiency due to persisting fatigue  Relevant Medications    tadalafil (CIALIS) 20 MG tablet    Other fatigue     Patient requesting to be screened for testosterone due to persisting fatigue  Has been trying to eat healthy and lose weight  Patient states that he is interested in starting testosterone replacement therapy if his levels  are low    He will be referred to urologist to discuss treatment         Relevant Orders    Testosterone, free, total

## 2023-11-17 ENCOUNTER — OFFICE VISIT (OUTPATIENT)
Dept: URGENT CARE | Facility: CLINIC | Age: 48
End: 2023-11-17
Payer: COMMERCIAL

## 2023-11-17 VITALS
OXYGEN SATURATION: 99 % | RESPIRATION RATE: 14 BRPM | BODY MASS INDEX: 36.96 KG/M2 | DIASTOLIC BLOOD PRESSURE: 88 MMHG | WEIGHT: 230 LBS | TEMPERATURE: 98.2 F | SYSTOLIC BLOOD PRESSURE: 133 MMHG | HEART RATE: 76 BPM | HEIGHT: 66 IN

## 2023-11-17 DIAGNOSIS — S46.209A INJURY OF TENDON OF BICEPS: Primary | ICD-10-CM

## 2023-11-17 DIAGNOSIS — M54.10 RADICULOPATHY, UNSPECIFIED SPINAL REGION: ICD-10-CM

## 2023-11-17 PROCEDURE — 99213 OFFICE O/P EST LOW 20 MIN: CPT | Performed by: NURSE PRACTITIONER

## 2023-11-17 RX ORDER — PREGABALIN 75 MG/1
CAPSULE ORAL
Qty: 120 CAPSULE | Refills: 5 | Status: SHIPPED | OUTPATIENT
Start: 2023-11-17 | End: 2023-11-21 | Stop reason: SDUPTHER

## 2023-11-17 NOTE — PATIENT INSTRUCTIONS
--Motrin, ice/heat as needed  --No heavy lifting  --Follow-up with PCP and ortho ASAP as you will likely need ultrasound or MRI to evaluate further.   Go to ER if unable to secure timely appointment and symptoms ongoing/worse

## 2023-11-17 NOTE — PROGRESS NOTES
[facility]        NAME: Shayy Kraus is a 57 y.o. male  : 1975    MRN: [id number]  DATE: 2023  TIME: 12:59 PM    Assessment and Plan   Injury of tendon of biceps [S46.209A]  1. Injury of tendon of biceps  Ambulatory referral to Orthopedic Surgery        --Suspected partial tear of distal biceps tendon. Advised that he will need ASAP imaging for confirmatory diagnosis, then possible  PT vs.  surgery. Address per below. Patient Instructions     --Motrin, ice/heat as needed  --No heavy lifting  --Follow-up with PCP and ortho ASAP as you will likely need ultrasound or MRI to evaluate further. Go to ER if unable to secure timely appointment and symptoms ongoing/worse    Chief Complaint     Chief Complaint   Patient presents with    Elbow Injury     Left elbow injury occurred 1 week ago. Pt is concerned for bicep or tendon injury. History of Present Illness       Here with complaints of right arm injury. Occurred a week ago while at work. Tried to catch heavy piece of concrete with right arm outstretched. Felt abrupt mild pain in antecubital area/distal biceps. No pain, swelling, bruising, popping, tearing. Arm has remained painful and weak since then. No N/T. Rates pain 6/10. Radiates to forearm. Applying heat. No OTC analgesics. Right hand dominant. Right tennis elbow surgery 2 years ago. No other arm issuese. Review of Systems   Review of Systems   Musculoskeletal:         Per HPI   Skin:  Negative for color change.          Current Medications       Current Outpatient Medications:     acetaminophen (TYLENOL) 650 mg CR tablet, Take 1 tablet (650 mg total) by mouth every 8 (eight) hours as needed for mild pain (Patient not taking: Reported on 2023), Disp: 30 tablet, Rfl: 0    albuterol (PROVENTIL HFA,VENTOLIN HFA) 90 mcg/act inhaler, Inhale 2 puffs every 6 (six) hours as needed for wheezing or shortness of breath (Patient not taking: Reported on 3/10/2022), Disp: 1 Inhaler, Rfl: 0    ALPRAZolam (XANAX) 0.25 mg tablet, Take 1 tablet (0.25 mg total) by mouth daily at bedtime as needed for anxiety (Patient not taking: Reported on 7/1/2022), Disp: 10 tablet, Rfl: 0    budesonide-formoterol (SYMBICORT) 80-4.5 MCG/ACT inhaler, Inhale 2 puffs 2 (two) times a day Rinse mouth after use.  (Patient not taking: Reported on 3/10/2022), Disp: 1 Inhaler, Rfl: 2    Cholecalciferol (VITAMIN D) 2000 units CAPS, Take 1 capsule by mouth daily, Disp: , Rfl:     docusate sodium (COLACE) 100 mg capsule, Take 1 capsule (100 mg total) by mouth 2 (two) times a day (Patient not taking: Reported on 3/10/2022), Disp: 20 capsule, Rfl: 0    Hydrocortisone Butyrate 0.1 % CREA, as needed (Patient not taking: Reported on 12/2/2022), Disp: , Rfl: 3    losartan (COZAAR) 50 mg tablet, Take 1 tablet (50 mg total) by mouth daily, Disp: 90 tablet, Rfl: 1    metFORMIN (GLUCOPHAGE) 500 mg tablet, Take 1 tablet (500 mg total) by mouth daily with breakfast, Disp: 90 tablet, Rfl: 1    methocarbamol (ROBAXIN) 750 mg tablet, Take 1 tablet (750 mg total) by mouth every 6 (six) hours as needed for muscle spasms (neck pain), Disp: 30 tablet, Rfl: 3    methylPREDNISolone 4 MG tablet therapy pack, Use as directed on package (Patient not taking: Reported on 3/10/2022), Disp: 21 each, Rfl: 0    methylPREDNISolone 4 MG tablet therapy pack, Use as directed on package (Patient not taking: Reported on 6/29/2023), Disp: 1 each, Rfl: 0    Multiple Vitamins-Minerals (multivitamin with minerals) tablet, Take 1 tablet by mouth daily (Patient not taking: Reported on 1/5/2023), Disp: , Rfl:     pregabalin (LYRICA) 75 mg capsule, Take 2 tablets in the morning and 2 tablets in the evening, Disp: 120 capsule, Rfl: 3    rosuvastatin (CRESTOR) 5 mg tablet, Take 1 tablet (5 mg total) by mouth daily, Disp: 90 tablet, Rfl: 1    tadalafil (CIALIS) 20 MG tablet, Take 1 tablet (20 mg total) by mouth daily as needed for erectile dysfunction, Disp: 30 tablet, Rfl: 4    Ustekinumab (STELARA SC), Inject under the skin every 3 (three) months (Patient not taking: Reported on 6/28/2023), Disp: , Rfl:     Current Allergies     Allergies as of 11/17/2023    (No Known Allergies)            The following portions of the patient's history were reviewed and updated as appropriate: allergies, current medications, past family history, past medical history, past social history, past surgical history and problem list.     Past Medical History:   Diagnosis Date    Diverticulitis     Hyperlipemia     Hypertension     Prediabetes     Psoriasis        Past Surgical History:   Procedure Laterality Date    ABDOMINAL SURGERY      COLONOSCOPY      [place] ARTHRD ANT INTERBODY DECOMPRESS CERVICAL BELW C2 Bilateral 1/26/2022    Procedure: C3/4 Anterior Cervical Discectomy and Fusion (neuromonitoring); Surgeon: Ree Aase, MD;  Location: [facility];  Service: Neurosurgery    [place] COLONOSCOPY FLX DX W/COLLJ [facility] WHEN PFRMD N/A 4/3/2019    Procedure: COLONOSCOPY;  Surgeon: Otilia Soler MD;  Location: [facility]  GI LAB; Service: Colorectal    [place] LAPAROSCOPY COLECTOMY PARTIAL W/ANASTOMOSIS N/A 1/8/2020    Procedure: RESECTION COLON SIGMOID LAPAROSCOPIC, LAPAROSCOPIC SPLENIC FLEXURE MOBILIZATION;  Surgeon: Otilia Soler MD;  Location: [facility] MAIN OR;  Service: Colorectal    [place] SIGMOIDOSCOPY FLX DX W/COLLJ SPEC BR/WA IF PFRMD N/A 1/8/2020    Procedure: Gerhardt Jacqueline;  Surgeon: Otilia Soler MD;  Location: [facility];  Service: Colorectal    TENDON REPAIR      right elbow       Family History   Problem Relation Age of Onset    Diabetes Maternal Grandmother     Diabetes Paternal Grandfather     Cancer Father     Colon cancer Neg Hx          Medications have been verified.         Objective   /88   Pulse 76   Temp 98.2 °F (36.8 °C)   Resp 14   Ht 5' 6" (1.676 m)   Wt 104 kg (230 lb)   SpO2 99%   BMI 37.12 kg/m²   No LMP for male patient. Physical Exam     Physical Exam  Musculoskeletal:         General: Tenderness and deformity present. Normal range of motion. Comments: Right arm with TTP overlying distal biceps tendon with associated mild deformity, swelling. No bruising. Remainder of arm non-tender with normal appearance. Negative Hook test.  4/5 biceps strength with pain. Normal elbow AROM. Sensation intact. Skin:     Findings: No bruising. Neurological:      General: No focal deficit present. Mental Status: He is alert.    Psychiatric:         Mood and Affect: Mood normal.

## 2023-11-20 ENCOUNTER — HOSPITAL ENCOUNTER (OUTPATIENT)
Dept: RADIOLOGY | Age: 48
Discharge: HOME | End: 2023-11-20
Payer: COMMERCIAL

## 2023-11-20 ENCOUNTER — OFFICE VISIT (OUTPATIENT)
Dept: FAMILY MEDICINE CLINIC | Facility: CLINIC | Age: 48
End: 2023-11-20
Payer: COMMERCIAL

## 2023-11-20 ENCOUNTER — HOSPITAL ENCOUNTER (OUTPATIENT)
Dept: RADIOLOGY | Facility: HOSPITAL | Age: 48
Discharge: HOME | End: 2023-11-20
Payer: COMMERCIAL

## 2023-11-20 ENCOUNTER — OFFICE VISIT (OUTPATIENT)
Dept: OBGYN CLINIC | Facility: CLINIC | Age: 48
End: 2023-11-20
Payer: COMMERCIAL

## 2023-11-20 VITALS
OXYGEN SATURATION: 98 % | WEIGHT: 240 LBS | HEIGHT: 66 IN | SYSTOLIC BLOOD PRESSURE: 132 MMHG | HEART RATE: 83 BPM | BODY MASS INDEX: 38.57 KG/M2 | DIASTOLIC BLOOD PRESSURE: 78 MMHG

## 2023-11-20 VITALS — HEIGHT: 66 IN | HEART RATE: 79 BPM | WEIGHT: 238 LBS | BODY MASS INDEX: 38.25 KG/M2 | OXYGEN SATURATION: 99 %

## 2023-11-20 DIAGNOSIS — M25.521 PAIN IN RIGHT ELBOW: ICD-10-CM

## 2023-11-20 DIAGNOSIS — M25.522 PAIN IN LEFT ELBOW: Primary | ICD-10-CM

## 2023-11-20 DIAGNOSIS — S46.209A INJURY OF TENDON OF BICEPS: ICD-10-CM

## 2023-11-20 DIAGNOSIS — N52.9 ERECTILE DYSFUNCTION, UNSPECIFIED ERECTILE DYSFUNCTION TYPE: ICD-10-CM

## 2023-11-20 DIAGNOSIS — M25.522 PAIN IN LEFT ELBOW: ICD-10-CM

## 2023-11-20 DIAGNOSIS — I10 ESSENTIAL HYPERTENSION: ICD-10-CM

## 2023-11-20 DIAGNOSIS — R06.83 SNORING: Primary | ICD-10-CM

## 2023-11-20 PROCEDURE — 73221 MRI JOINT UPR EXTREM W/O DYE: CPT

## 2023-11-20 PROCEDURE — 99213 OFFICE O/P EST LOW 20 MIN: CPT | Performed by: FAMILY MEDICINE

## 2023-11-20 PROCEDURE — G1004 CDSM NDSC: HCPCS

## 2023-11-20 PROCEDURE — 73080 X-RAY EXAM OF ELBOW: CPT

## 2023-11-20 PROCEDURE — 99213 OFFICE O/P EST LOW 20 MIN: CPT | Performed by: PHYSICIAN ASSISTANT

## 2023-11-20 RX ORDER — TADALAFIL 20 MG/1
20 TABLET ORAL DAILY PRN
Qty: 30 TABLET | Refills: 4 | Status: SHIPPED | OUTPATIENT
Start: 2023-11-20

## 2023-11-20 NOTE — PROGRESS NOTES
Subjective:      Patient ID: Robbie Chopra is a 57 y.o. male. HPI    Patient is here requesting a sleep study to be evaluated for sleep apnea. Patient reports partner complains about snoring and witnessed apneas during sleep. Patient does mention about unrefreshing sleep, dry mouth and morning headaches. Patient has history of type 2 diabetes, hypertension, dyslipidemia. He is due for metabolic labs and follow-up. Also requesting refill of Cialis which she takes for erectile dysfunction. Has been tolerating medication without any side effects. Past Medical History:   Diagnosis Date    Diverticulitis     Hyperlipemia     Hypertension     Prediabetes     Psoriasis        Family History   Problem Relation Age of Onset    Diabetes Maternal Grandmother     Diabetes Paternal Grandfather     Cancer Father     Colon cancer Neg Hx        Past Surgical History:   Procedure Laterality Date    ABDOMINAL SURGERY      COLONOSCOPY      [place] ARTHRD ANT INTERBODY DECOMPRESS CERVICAL BELW C2 Bilateral 1/26/2022    Procedure: C3/4 Anterior Cervical Discectomy and Fusion (neuromonitoring); Surgeon: Stefan Santana MD;  Location: [facility];  Service: Neurosurgery    [place] COLONOSCOPY FLX DX W/COLLJ [facility] WHEN PFRMD N/A 4/3/2019    Procedure: COLONOSCOPY;  Surgeon: Judith Yost MD;  Location: [facility] GI LAB; Service: Colorectal    [place] LAPAROSCOPY COLECTOMY PARTIAL W/ANASTOMOSIS N/A 1/8/2020    Procedure: RESECTION COLON SIGMOID LAPAROSCOPIC, LAPAROSCOPIC SPLENIC FLEXURE MOBILIZATION;  Surgeon: Judith Yost MD;  Location: [facility] MA[facility] OR;  Service: Colorectal    [place] SIGMOIDOSCOPY FLX DX W/COLLJ SPEC BR/WA IF PFRMD N/A 1/8/2020    Procedure: SIGMOIDOSCOPY FLEXIBLE;  Surgeon: Judith Yost MD;  Location: [facility];  Service: Colorectal    TENDON REPAIR      right elbow        reports that he has quit smoking. His smoking use included cigarettes. He has a 60.00 pack-year smoking history.  His smokeless tobacco use includes chew. He reports current alcohol use. He reports current drug use. Drug: Marijuana. Current Outpatient Medications:     Cholecalciferol (VITAMIN D) 2000 units CAPS, Take 1 capsule by mouth daily, Disp: , Rfl:     losartan (COZAAR) 50 mg tablet, Take 1 tablet (50 mg total) by mouth daily, Disp: 90 tablet, Rfl: 1    metFORMIN (GLUCOPHAGE) 500 mg tablet, Take 1 tablet (500 mg total) by mouth daily with breakfast, Disp: 90 tablet, Rfl: 1    methocarbamol (ROBAXIN) 750 mg tablet, Take 1 tablet (750 mg total) by mouth every 6 (six) hours as needed for muscle spasms (neck pain), Disp: 30 tablet, Rfl: 3    pregabalin (LYRICA) 75 mg capsule, Take 2 tablets in the morning and 2 tablets in the evening, Disp: 120 capsule, Rfl: 5    rosuvastatin (CRESTOR) 5 mg tablet, Take 1 tablet (5 mg total) by mouth daily, Disp: 90 tablet, Rfl: 1    tadalafil (CIALIS) 20 MG tablet, Take 1 tablet (20 mg total) by mouth daily as needed for erectile dysfunction, Disp: 30 tablet, Rfl: 4    acetaminophen (TYLENOL) 650 mg CR tablet, Take 1 tablet (650 mg total) by mouth every 8 (eight) hours as needed for mild pain (Patient not taking: Reported on 1/5/2023), Disp: 30 tablet, Rfl: 0    albuterol (PROVENTIL HFA,VENTOLIN HFA) 90 mcg/act inhaler, Inhale 2 puffs every 6 (six) hours as needed for wheezing or shortness of breath (Patient not taking: Reported on 3/10/2022), Disp: 1 Inhaler, Rfl: 0    ALPRAZolam (XANAX) 0.25 mg tablet, Take 1 tablet (0.25 mg total) by mouth daily at bedtime as needed for anxiety (Patient not taking: Reported on 7/1/2022), Disp: 10 tablet, Rfl: 0    budesonide-formoterol (SYMBICORT) 80-4.5 MCG/ACT inhaler, Inhale 2 puffs 2 (two) times a day Rinse mouth after use.  (Patient not taking: Reported on 3/10/2022), Disp: 1 Inhaler, Rfl: 2    docusate sodium (COLACE) 100 mg capsule, Take 1 capsule (100 mg total) by mouth 2 (two) times a day (Patient not taking: Reported on 3/10/2022), Disp: 20 capsule, Rfl: 0 Hydrocortisone Butyrate 0.1 % CREA, as needed (Patient not taking: Reported on 12/2/2022), Disp: , Rfl: 3    methylPREDNISolone 4 MG tablet therapy pack, Use as directed on package (Patient not taking: Reported on 3/10/2022), Disp: 21 each, Rfl: 0    methylPREDNISolone 4 MG tablet therapy pack, Use as directed on package (Patient not taking: Reported on 6/29/2023), Disp: 1 each, Rfl: 0    Multiple Vitamins-Minerals (multivitamin with minerals) tablet, Take 1 tablet by mouth daily (Patient not taking: Reported on 1/5/2023), Disp: , Rfl:     Ustekinumab (STELARA SC), Inject under the skin every 3 (three) months (Patient not taking: Reported on 6/28/2023), Disp: , Rfl:     The following portions of the patient's history were reviewed and updated as appropriate: allergies, current medications, past family history, past medical history, past social history, past surgical history and problem list.    Review of Systems   Constitutional: Negative. Respiratory: Negative. Cardiovascular: Negative. Objective:    /78   Pulse 83   Ht 5' 6" (1.676 m)   Wt 109 kg (240 lb)   SpO2 98%   BMI 38.74 kg/m²      Physical Exam  Constitutional:       Appearance: Normal appearance. Cardiovascular:      Pulses: no weak pulses     Heart sounds: Normal heart sounds. Pulmonary:      Breath sounds: Normal breath sounds. Feet:      Right foot:      Skin integrity: No ulcer, skin breakdown, erythema, warmth, callus or dry skin. Left foot:      Skin integrity: No ulcer, skin breakdown, erythema, warmth, callus or dry skin. Neurological:      Mental Status: He is oriented to person, place, and time. Psychiatric:         Mood and Affect: Mood normal.       Patient's shoes and socks removed. Right Foot/Ankle   Right Foot Inspection  Skin Exam: skin normal and skin intact. No dry skin, no warmth, no callus, no erythema, no maceration, no abnormal color, no pre-ulcer, no ulcer and no callus.      Toe Exam: ROM and strength within normal limits. Sensory   Vibration: intact  Proprioception: intact  Monofilament testing: intact    Vascular  Capillary refills: < 3 seconds      Left Foot/Ankle  Left Foot Inspection  Skin Exam: skin normal and skin intact. No dry skin, no warmth, no erythema, no maceration, normal color, no pre-ulcer, no ulcer and no callus. Toe Exam: ROM and strength within normal limits. Sensory   Vibration: intact  Proprioception: intact  Monofilament testing: intact    Vascular  Capillary refills: < 3 seconds      Assign Risk Category  No deformity present  No loss of protective sensation  No weak pulses  Risk: 0      No results found for this or any previous visit (from the past 1008 hour(s)). Assessment/Plan:    No problem-specific Assessment & Plan notes found for this encounter. Problem List Items Addressed This Visit          Cardiovascular and Mediastinum    Essential hypertension     Blood pressure is at goal.  Continue losartan 50 milligram.         Relevant Orders    Diagnostic Sleep Study       Other    Erectile dysfunction     On Cialis 20 milligram.  Tolerating without any side effects. Continue same. Requesting a refill. Patient requesting to be screened for testosterone deficiency due to persisting fatigue. Relevant Medications    tadalafil (CIALIS) 20 MG tablet    Snoring - Primary     Advised sleep study with h/o snoring, witnessed gasping. He has type 2 diabetes, hypertension, dyslipidemia, BMI of 38.            Relevant Orders    Diagnostic Sleep Study

## 2023-11-20 NOTE — ASSESSMENT & PLAN NOTE
Advised sleep study with h/o snoring, witnessed gasping. He has type 2 diabetes, hypertension, dyslipidemia, BMI of 38.

## 2023-11-20 NOTE — PROGRESS NOTES
Assessment/Plan   Diagnoses and all orders for this visit:    Pain in left elbow    Suspected acute rupture of distal biceps tendon, 3 weeks ago    - MRI  - Ice as needed  - Follow up with Dr. Simon Han in [facility] in the next few days          Subjective   Patient ID: Judy Vargas is a 57 y.o. male. Vitals:    11/20/23 1106   Pulse: 79   SpO2: 99%     49yo male comes in for an evaluation of his left elbow. He was injured about 3 weeks ago, when he had a sudden, sharp pain in the antecubital fossa when trying to lift an 80lb bag of concrete. Since then, he has been having continued pain and weakness. The pain is now dull in character, moderate in severity, pain does not radiate and is not associated with numbness. The following portions of the patient's history were reviewed and updated as appropriate: allergies, current medications, past family history, past medical history, past social history, past surgical history, and problem list.    Review of Systems  Ortho Exam  Past Medical History:   Diagnosis Date    Diverticulitis     Hyperlipemia     Hypertension     Prediabetes     Psoriasis      Past Surgical History:   Procedure Laterality Date    ABDOMINAL SURGERY      COLONOSCOPY      [place] ARTHRD ANT INTERBODY DECOMPRESS CERVICAL BELW C2 Bilateral 1/26/2022    Procedure: C3/4 Anterior Cervical Discectomy and Fusion (neuromonitoring); Surgeon: Torin Ruelas MD;  Location: [facility];  Service: Neurosurgery    [place] COLONOSCOPY FLX DX W/COLLJ [facility] WHEN PFRMD N/A 4/3/2019    Procedure: COLONOSCOPY;  Surgeon: Ivis Olivo MD;  Location: [facility]  GI LAB;   Service: Colorectal    [place] LAPAROSCOPY COLECTOMY PARTIAL W/ANASTOMOSIS N/A 1/8/2020    Procedure: RESECTION COLON SIGMOID LAPAROSCOPIC, LAPAROSCOPIC SPLENIC FLEXURE MOBILIZATION;  Surgeon: Ivis Olivo MD;  Location: [facility];  Service: Colorectal    [place] SIGMOIDOSCOPY FLX DX W/COLLJ SPEC BR/WA IF PFRMD N/A 1/8/2020    Procedure: SIGMOIDOSCOPY FLEXIBLE;  Surgeon: Buffy Lockhart MD;  Location: [facility];  Service: Colorectal    TENDON REPAIR      right elbow     Family History   Problem Relation Age of Onset    Diabetes Maternal Grandmother     Diabetes Paternal Grandfather     Cancer Father     Colon cancer Neg Hx      Social History     Occupational History    Not on file   Tobacco Use    Smoking status: Former     Packs/day: 2.00     Years: 30.00     Total pack years: 60.00     Types: Cigarettes    Smokeless tobacco: Current     Types: Chew    Tobacco comments:     Quit 20 months ago   Vaping Use    Vaping Use: Never used   Substance and Sexual Activity    Alcohol use: Yes     Comment: occasional alcohol use    Drug use: Yes     Types: Marijuana     Comment: every couple weeks    Sexual activity: Not Currently       Review of Systems   Constitutional: Negative. HENT: Negative. Eyes: Negative. Respiratory: Negative. Cardiovascular: Negative. Gastrointestinal: Negative. Endocrine: Negative. Genitourinary: Negative. Musculoskeletal: As below. .   Allergic/Immunologic: Negative. Neurological: Negative. Hematological: Negative. Psychiatric/Behavioral: Negative. Objective   Physical Exam    Xrays  3 views of the left elbow were done in the office today  I have personally reviewed pertinent films in PACS and my interpretation is no acute displaced fracture or effusion on xray. Radiologist reading pending.     Constitutional: Awake, Alert, Oriented  Eyes: EOMI  Psych: Mood and affect appropriate  Heart: regular rate   Lungs: No audible wheezing  Abdomen: No guarding  Lymph: no lymphedema      left Elbow:  Appearance  No swelling, discoloration, deformity, or ecchymosis  Palpation  + Tenderness: Anterior elbow  ROM  Extension: 5 and Flexion: 120  Motor  5- on 5 flexion, 5 of 5 extension, 5/5 pronation  3/5 supination  Special Tests  No instability with valgus / varus stress   NVI distally

## 2023-11-20 NOTE — ASSESSMENT & PLAN NOTE
Lab Results   Component Value Date    HGBA1C 5.9 (H) 06/27/2023     Patient is currently on metformin 500 mg daily. Tolerating well without any symptoms of hypoglycemia. Advised to continue for now.   Recommended metabolic labs/follow-up at 6-month interval.  Lab Results   Component Value Date    HGBA1C 5.9 (H) 06/27/2023

## 2023-11-20 NOTE — ASSESSMENT & PLAN NOTE
On Cialis 20 milligram.  Tolerating without any side effects. Continue same. Requesting a refill. Patient requesting to be screened for testosterone deficiency due to persisting fatigue.

## 2023-11-21 ENCOUNTER — TELEPHONE (OUTPATIENT)
Age: 48
End: 2023-11-21

## 2023-11-21 DIAGNOSIS — M54.10 RADICULOPATHY, UNSPECIFIED SPINAL REGION: ICD-10-CM

## 2023-11-21 RX ORDER — PREGABALIN 150 MG/1
CAPSULE ORAL
Qty: 60 CAPSULE | Refills: 5 | Status: SHIPPED | OUTPATIENT
Start: 2023-11-21

## 2023-11-21 NOTE — TELEPHONE ENCOUNTER
Patient called Stating that in order for his insurance to cover his Pregabalin it needs to be changed   pregabalin 150mg 1 capsule in the morning and 1 capsule in the evening. Please review and send a new scirpt to the pharmacy.

## 2023-11-21 NOTE — TELEPHONE ENCOUNTER
S/w pt, advised of the same. Pt verbalized understanding and appreciation. Advised to cb with any further questions or concerns.

## 2023-11-22 ENCOUNTER — OFFICE VISIT (OUTPATIENT)
Dept: OBGYN CLINIC | Facility: CLINIC | Age: 48
End: 2023-11-22
Payer: COMMERCIAL

## 2023-11-22 VITALS
DIASTOLIC BLOOD PRESSURE: 113 MMHG | BODY MASS INDEX: 38.31 KG/M2 | SYSTOLIC BLOOD PRESSURE: 173 MMHG | WEIGHT: 238.4 LBS | HEIGHT: 66 IN | HEART RATE: 75 BPM

## 2023-11-22 DIAGNOSIS — S46.209A INJURY OF TENDON OF BICEPS: Primary | ICD-10-CM

## 2023-11-22 PROCEDURE — 99214 OFFICE O/P EST MOD 30 MIN: CPT | Performed by: ORTHOPAEDIC SURGERY

## 2023-11-22 NOTE — PROGRESS NOTES
[address]  [address] [place] [address] Road [address] [phone]       [facility]  [id number]  1975    ORTHOPAEDIC SURGERY OUTPATIENT NOTE  11/22/2023      HISTORY:  57 y.o. male  presents for initial evaluation of his right elbow. He reports an injury 3 weeks ago lifting heavy bags of concrete. No pop or bruising but he had pain. He was seen by Sima Khalil PA-C and had an MRI to evaluate for biceps tear. He is RHD. He works in construction. He has rested for 2 weeks. He has use ice. No NSAID otc. Past Medical History:   Diagnosis Date    Diverticulitis     Hyperlipemia     Hypertension     Prediabetes     Psoriasis        Past Surgical History:   Procedure Laterality Date    ABDOMINAL SURGERY      COLONOSCOPY      LA ARTHRD ANT INTERBODY DECOMPRESS CERVICAL BELW C2 Bilateral 1/26/2022    Procedure: C3/4 Anterior Cervical Discectomy and Fusion (neuromonitoring); Surgeon: Courtney Larson MD;  [facility];  Service: Neurosurgery    LA COLONOSCOPY FLX DX W/COLLJ [facility] WHEN PFRMD N/A 4/3/2019    Procedure: COLONOSCOPY;  Surgeon: Miah Montoya MD;  Location: [facility];   Service: Colorectal    LA LAPAROSCOPY COLECTOMY PARTIAL W/ANASTOMOSIS N/A 1/8/2020    Procedure: RESECTION COLON SIGMOID LAPAROSCOPIC, LAPAROSCOPIC SPLENIC FLEXURE MOBILIZATION;  Surgeon: Miah Montoya MD;  [facility];  Service: Colorectal    LA SIGMOIDOSCOPY FLX DX W/COLLJ SPEC BR/WA IF PFRMD N/A 1/8/2020    Procedure: Clementina Concepcion;  Surgeon: Miah Montoya MD;  [facility];  Service: Colorectal    TENDON REPAIR      right elbow       Social History     Socioeconomic History    Marital status: Legally      Spouse name: Not on file    Number of children: Not on file    Years of education: Not on file    Highest education level: Not on file   Occupational History    Not on file   Tobacco Use    Smoking status: Former     Packs/day: 2.00     Years: 30.00     Total pack years: 60.00     Types: Cigarettes    Smokeless tobacco: Current     Types: Chew    Tobacco comments:     Quit 20 months ago   Vaping Use    Vaping Use: Never used   Substance and Sexual Activity    Alcohol use: Yes     Comment: occasional alcohol use    Drug use: Yes     Types: Marijuana     Comment: every couple weeks    Sexual activity: Not Currently   Other Topics Concern    Not on file   Social History Narrative    Not on file     Social Determinants of Health     Financial Resource Strain: Not on file   Food Insecurity: Not on file   Transportation Needs: Not on file   Physical Activity: Not on file   Stress: Not on file   Social Connections: Not on file   Intimate Partner Violence: Not on file   Housing Stability: Not on file       Family History   Problem Relation Age of Onset    Diabetes Maternal Grandmother     Diabetes Paternal Grandfather     Cancer Father     Colon cancer Neg Hx         Patient's Medications   New Prescriptions    No medications on file   Previous Medications    ACETAMINOPHEN (TYLENOL) 650 MG CR TABLET    Take 1 tablet (650 mg total) by mouth every 8 (eight) hours as needed for mild pain    ALBUTEROL (PROVENTIL HFA,VENTOLIN HFA) 90 MCG/ACT INHALER    Inhale 2 puffs every 6 (six) hours as needed for wheezing or shortness of breath    ALPRAZOLAM (XANAX) 0.25 MG TABLET    Take 1 tablet (0.25 mg total) by mouth daily at bedtime as needed for anxiety    BUDESONIDE-FORMOTEROL (SYMBICORT) 80-4.5 MCG/ACT INHALER    Inhale 2 puffs 2 (two) times a day Rinse mouth after use.     CHOLECALCIFEROL (VITAMIN D) 2000 UNITS CAPS    Take 1 capsule by mouth daily    DOCUSATE SODIUM (COLACE) 100 MG CAPSULE    Take 1 capsule (100 mg total) by mouth 2 (two) times a day    HYDROCORTISONE BUTYRATE 0.1 % CREA    as needed    LOSARTAN (COZAAR) 50 MG TABLET    Take 1 tablet (50 mg total) by mouth daily    METFORMIN (GLUCOPHAGE) 500 MG TABLET    Take 1 tablet (500 mg total) by mouth daily with breakfast    METHOCARBAMOL (ROBAXIN) 750 MG TABLET    Take 1 tablet (750 mg total) by mouth every 6 (six) hours as needed for muscle spasms (neck pain)    METHYLPREDNISOLONE 4 MG TABLET THERAPY PACK    Use as directed on package    METHYLPREDNISOLONE 4 MG TABLET THERAPY PACK    Use as directed on package    MULTIPLE VITAMINS-MINERALS (MULTIVITAMIN WITH MINERALS) TABLET    Take 1 tablet by mouth daily    PREGABALIN (LYRICA) 150 MG CAPSULE    Take 1 tablet in the morning and 1 tablet in the evening    ROSUVASTATIN (CRESTOR) 5 MG TABLET    Take 1 tablet (5 mg total) by mouth daily    TADALAFIL (CIALIS) 20 MG TABLET    Take 1 tablet (20 mg total) by mouth daily as needed for erectile dysfunction    USTEKINUMAB (STELARA SC)    Inject under the skin every 3 (three) months   Modified Medications    No medications on file   Discontinued Medications    No medications on file       No Known Allergies     BP (!) 173/113 (BP Location: Left arm, Patient Position: Sitting, Cuff Size: Standard)   Pulse 75   Ht 5' 6" (1.676 m)   Wt 108 kg (238 lb 6.4 oz)   BMI 38.48 kg/m²      REVIEW OF SYSTEMS:  Constitutional: Negative. HEENT: Negative. Respiratory: Negative. Skin: Negative. Neurological: Negative. Psychiatric/Behavioral: Negative.   Musculoskeletal: Negative except for that mentioned in the HPI.    BP (!) 173/113 (BP Location: Left arm, Patient Position: Sitting, Cuff Size: Standard)   Pulse 75   Ht 5' 6" (1.676 m)   Wt 108 kg (238 lb 6.4 oz)   BMI 38.48 kg/m²   Gen: No acute distress, resting comfortably in bed  HEENT: Eyes clear, moist mucus membranes, hearing intact  Respiratory: No audible wheezing or stridor  Cardiovascular: Well Perfused peripherally, 2+ distal pulse  Abdomen: nondistended, no peritoneal signs     PHYSICAL EXAM:    RIGHT ELBOW:    Appearance: no ecchymosis or defect    Flexion: 140 degrees  Extension: 0 degrees  Pronation: 80 degrees  Supination: 80 degrees    TTP Lateral Epicondyle: negative  TTP Medial Epicondyle: negative  TTP Olecranon: negative  TTP Radial Head: negative  TTP Biceps Tendon: positive    Strength:  Flexion: 5/5  Extension: 5/5  Pronation: 5/5  Supination: 5/5    Hook test negative    Cubital tunnel Tinel's: negative    Radial/median/ulnar nerve intact    <2 sec cap refill        IMAGING:  MRI of the right elbow as reviewed, this demonstrates interstitial tearing of the distal biceps tendon without full thickness tearing. ASSESSMENT AND PLAN:  57 y.o. male  with right distal biceps strain, interstitial tearing tendon without full thickness or high grade partial thickness tear. We discussed he has intact strength with supination. We recommend conservative treatment with NSAIDs otc and physical therapy. He will perform 6 weeks of PT and return to us if not improving.      Scribe Attestation      I,:  Yoandy Caro PA-C am acting as a scribe while in the presence of the attending physician.:       I,:  Emiliano Billings personally performed the services described in this documentation    as scribed in my presence.:

## 2023-11-29 ENCOUNTER — EVALUATION (OUTPATIENT)
Dept: PHYSICAL THERAPY | Facility: MEDICAL CENTER | Age: 48
End: 2023-11-29
Attending: PHYSICIAN ASSISTANT
Payer: COMMERCIAL

## 2023-11-29 DIAGNOSIS — S46.209A INJURY OF TENDON OF BICEPS: ICD-10-CM

## 2023-11-29 PROCEDURE — 97110 THERAPEUTIC EXERCISES: CPT | Performed by: PHYSICAL THERAPIST

## 2023-11-29 PROCEDURE — 97161 PT EVAL LOW COMPLEX 20 MIN: CPT | Performed by: PHYSICAL THERAPIST

## 2023-11-29 NOTE — PROGRESS NOTES
PT Evaluation     Today's date: 2023  Patient name: Dara Vega  : 1975  MRN: [id number]  Referring provider: Vik Seay PA-C  Dx:   Encounter Diagnosis     ICD-10-CM    1. Injury of tendon of biceps  S46.209A Ambulatory referral to Physical Therapy          Start Time: 715  Stop Time: 7953  Total time in clinic (min): 40 minutes    Assessment  Assessment details: Pt is a 57 y.o. male who presents to OP PT with increased R elbow pain,  R elbow ROM, decreased R elbow strength and decreased activity tolerance. These impairments limit the patient from participating in work related activities such as lifting, carrying, and turning a screw , decreased tolerance to performing house hold duties and decreased tolerance to participating in the community. Pt educated about symptoms at present and plan for PT. Pt given HEP today consisting of gentle AA and AROM of the R elbow. We will follow up with patient next week with tentative plan to begin gentle strengthening. I believe this patient is a good candidate for and will benefit from skilled physical therapy for manual therapy to the R elbow, ROM exercises, strengthening exercises and mechanics training to improve limitations and assist the patient to return to OF. Thank you for the opportunity to participate in [facility] care.     Positive Prognostic Indicators: desire to improve    Negative Prognostic Indicators: PMH       Impairments: abnormal muscle tone, abnormal or restricted ROM, abnormal movement, activity intolerance, impaired physical strength, lacks appropriate home exercise program and pain with function    Symptom irritability: lowUnderstanding of Dx/Px/POC: good   Prognosis: good    Goals  STGs: 4 weeks  1) Pt will have SPR decrease of 2 units at worst  2) pt will have improved R elbow flexion strength to 5/5  3) pt will have improved foto score of 10 points    LTGs: 8 weeks  1) pt will be independent with HEP by D/C  2) pt will be independent with symptom management by D/C  3) pt will demonstrate ability to turn screw  with RUE with no more than 1/10 pain in the R elbow in order to demonstrate ability to perform work related activity by DC. Plan  Patient would benefit from: skilled physical therapy  Planned modality interventions: cryotherapy  Planned therapy interventions: joint mobilization, manual therapy, neuromuscular re-education, patient education, postural training, strengthening, stretching, therapeutic activities, therapeutic exercise, home exercise program, graded activity and functional ROM exercises  Frequency: 2x week  Duration in weeks: 12  Plan of Care beginning date: 11/29/2023  Plan of Care expiration date: 2/21/2024  Treatment plan discussed with: patient        Subjective Evaluation    History of Present Illness  Mechanism of injury: DOO: 3-4 weeks  FRANSISCA:      Subjective Comments: pt reports that he was moving bags of concrete around. He reports one almost slipped and he grabbed it with elbow pain. Pt reports that the past couple days he has noted improvement but he has not been doing anything. Pt points to the anterior elbow as location of pain. Pain is shooting at times. He does not note pain with elbow extension but has pain with full flexion. He notes decrease in elbow strength. RHD. Pt has increased difficulty with door knobs. Pain   Rest: 4-5/10   Best: 4-5/10   Worst: 8/10    Relieving Factors: ibuprofen, ice, soaking in warm water    Exacerbating Factors: picking something up, flexing bicep    Sleeping: independent     Home Set-up: compensates with LUE    ADLs: independent     Work/Hobbies: construction, kitchen and bathroom remodel    Previous Treatment: n/a    Goals:  go back to work               Objective     Palpation     Additional Palpation Details  No tenderness throughout musculature    Tenderness     Right Elbow   Tenderness in the distal biceps tendon.      Right Wrist/Hand   Tenderness in the distal biceps tendon. Active Range of Motion     Left Elbow   Flexion: 130 degrees   Extension: 0 degrees   Forearm supination: 60 degrees   Forearm pronation: 68 degrees     Right Elbow   Flexion: 125 degrees with pain  Extension: 0 degrees   Forearm supination: 60 degrees   Forearm pronation: 90 degrees     Additional Active Range of Motion Details  WFL R shoulder AROM with some pain with IR BHB    Passive Range of Motion     Right Elbow   Flexion: 135 degrees   Extension: 0 degrees   Forearm supination: 65 degrees   Forearm pronation: 90 degrees     Strength/Myotome Testing     Left Elbow   Flexion: 5  Extension: 5  Forearm supination: 5  Forearm pronation: 5    Right Elbow   Flexion: 4+  Extension: 5  Forearm supination: 4+  Forearm pronation: 5             Precautions: DM, asthma, HTN, Hx cervical spine fusion C3/4.       Manuals 11/29            IASTM R distal bicep tendon NV                                                   Neuro Re-Ed             Band rows             Band ext             No monies                                                                 Ther Ex             pulleys             Elbow flexion AA x10            Wrist supination X10 AROM elbow flexed            Shoulder extension stretch Cane palms away 3"x10            Wall slides             IR towel assist                                       Ther Activity                                       Gait Training                                       Modalities

## 2023-12-04 ENCOUNTER — TELEPHONE (OUTPATIENT)
Dept: SLEEP CENTER | Facility: CLINIC | Age: 48
End: 2023-12-04

## 2023-12-04 NOTE — TELEPHONE ENCOUNTER
----- Message from Yolie Luke MD sent at 12/1/2023  3:14 PM EST -----  approved  ----- Message -----  From: Senia Miranda  Sent: 11/28/2023  10:10 AM EST  To: Sleep Medicine [facility] Provider    This Diagnostic sleep study needs approval.     If approved please sign and return to clerical pool. If denied please include reasons why. Also provide alternative testing if warranted. Please sign and return to clerical pool.

## 2024-01-08 DIAGNOSIS — E78.5 HYPERLIPIDEMIA, UNSPECIFIED HYPERLIPIDEMIA TYPE: ICD-10-CM

## 2024-01-08 DIAGNOSIS — I10 ESSENTIAL HYPERTENSION: ICD-10-CM

## 2024-01-08 DIAGNOSIS — E11.9 CONTROLLED TYPE 2 DIABETES MELLITUS WITHOUT COMPLICATION, WITHOUT LONG-TERM CURRENT USE OF INSULIN (HCC): ICD-10-CM

## 2024-01-09 RX ORDER — ROSUVASTATIN CALCIUM 5 MG/1
5 TABLET, COATED ORAL DAILY
Qty: 90 TABLET | Refills: 1 | Status: SHIPPED | OUTPATIENT
Start: 2024-01-09

## 2024-01-09 RX ORDER — LOSARTAN POTASSIUM 50 MG/1
50 TABLET ORAL DAILY
Qty: 90 TABLET | Refills: 1 | Status: SHIPPED | OUTPATIENT
Start: 2024-01-09

## 2024-01-22 ENCOUNTER — APPOINTMENT (OUTPATIENT)
Dept: LAB | Facility: CLINIC | Age: 49
End: 2024-01-22
Payer: COMMERCIAL

## 2024-01-22 DIAGNOSIS — R53.83 OTHER FATIGUE: ICD-10-CM

## 2024-01-22 DIAGNOSIS — E11.9 CONTROLLED TYPE 2 DIABETES MELLITUS WITHOUT COMPLICATION, WITHOUT LONG-TERM CURRENT USE OF INSULIN (HCC): ICD-10-CM

## 2024-01-22 DIAGNOSIS — E78.5 HYPERLIPIDEMIA, UNSPECIFIED HYPERLIPIDEMIA TYPE: ICD-10-CM

## 2024-01-22 LAB
ALBUMIN SERPL BCP-MCNC: 4.5 G/DL (ref 3.5–5)
ALP SERPL-CCNC: 77 U/L (ref 34–104)
ALT SERPL W P-5'-P-CCNC: 40 U/L (ref 7–52)
ANION GAP SERPL CALCULATED.3IONS-SCNC: 8 MMOL/L
AST SERPL W P-5'-P-CCNC: 21 U/L (ref 13–39)
BILIRUB SERPL-MCNC: 0.37 MG/DL (ref 0.2–1)
BUN SERPL-MCNC: 14 MG/DL (ref 5–25)
CALCIUM SERPL-MCNC: 9.4 MG/DL (ref 8.4–10.2)
CHLORIDE SERPL-SCNC: 102 MMOL/L (ref 96–108)
CHOLEST SERPL-MCNC: 168 MG/DL
CO2 SERPL-SCNC: 28 MMOL/L (ref 21–32)
CREAT SERPL-MCNC: 0.94 MG/DL (ref 0.6–1.3)
CREAT UR-MCNC: 131.7 MG/DL
EST. AVERAGE GLUCOSE BLD GHB EST-MCNC: 148 MG/DL
GFR SERPL CREATININE-BSD FRML MDRD: 96 ML/MIN/1.73SQ M
GLUCOSE P FAST SERPL-MCNC: 121 MG/DL (ref 65–99)
HBA1C MFR BLD: 6.8 %
HDLC SERPL-MCNC: 40 MG/DL
LDLC SERPL CALC-MCNC: 101 MG/DL (ref 0–100)
MICROALBUMIN UR-MCNC: 7.6 MG/L
MICROALBUMIN/CREAT 24H UR: 6 MG/G CREATININE (ref 0–30)
NONHDLC SERPL-MCNC: 128 MG/DL
POTASSIUM SERPL-SCNC: 4.4 MMOL/L (ref 3.5–5.3)
PROT SERPL-MCNC: 7.2 G/DL (ref 6.4–8.4)
SODIUM SERPL-SCNC: 138 MMOL/L (ref 135–147)
TRIGL SERPL-MCNC: 135 MG/DL

## 2024-01-22 PROCEDURE — 36415 COLL VENOUS BLD VENIPUNCTURE: CPT

## 2024-01-22 PROCEDURE — 80053 COMPREHEN METABOLIC PANEL: CPT

## 2024-01-22 PROCEDURE — 83036 HEMOGLOBIN GLYCOSYLATED A1C: CPT

## 2024-01-22 PROCEDURE — 80061 LIPID PANEL: CPT

## 2024-01-23 ENCOUNTER — OFFICE VISIT (OUTPATIENT)
Dept: FAMILY MEDICINE CLINIC | Facility: CLINIC | Age: 49
End: 2024-01-23
Payer: COMMERCIAL

## 2024-01-23 VITALS
HEIGHT: 66 IN | DIASTOLIC BLOOD PRESSURE: 80 MMHG | RESPIRATION RATE: 16 BRPM | SYSTOLIC BLOOD PRESSURE: 130 MMHG | HEART RATE: 74 BPM | BODY MASS INDEX: 39.05 KG/M2 | OXYGEN SATURATION: 98 % | WEIGHT: 243 LBS

## 2024-01-23 DIAGNOSIS — N52.9 ERECTILE DYSFUNCTION, UNSPECIFIED ERECTILE DYSFUNCTION TYPE: ICD-10-CM

## 2024-01-23 DIAGNOSIS — I10 ESSENTIAL HYPERTENSION: ICD-10-CM

## 2024-01-23 DIAGNOSIS — E11.9 CONTROLLED TYPE 2 DIABETES MELLITUS WITHOUT COMPLICATION, WITHOUT LONG-TERM CURRENT USE OF INSULIN (HCC): Primary | ICD-10-CM

## 2024-01-23 DIAGNOSIS — E78.5 HYPERLIPIDEMIA, UNSPECIFIED HYPERLIPIDEMIA TYPE: ICD-10-CM

## 2024-01-23 PROCEDURE — 99214 OFFICE O/P EST MOD 30 MIN: CPT | Performed by: FAMILY MEDICINE

## 2024-01-23 RX ORDER — TADALAFIL 20 MG/1
20 TABLET ORAL DAILY PRN
Qty: 30 TABLET | Refills: 4 | Status: SHIPPED | OUTPATIENT
Start: 2024-01-23

## 2024-01-23 NOTE — PROGRESS NOTES
Subjective:      Patient ID: Gonzalo Mai is a 48 y.o. male.    HPI    Patient is here for routine 6-month follow-up.  Has history of type 2 diabetes, hypertension, dyslipidemia, erectile dysfunction.  Metabolic labs reviewed with patient today.  Noted to have an increase in his A1c to 6.8, LDL is borderline.  Patient is on metformin 500 mg daily.  Reports difficulty controlling his appetite.  Unable to exercise due to chronic back pain.  Patient is interested in starting Ozempic.      Past Medical History:   Diagnosis Date    Diverticulitis     Hyperlipemia     Hypertension     Prediabetes     Psoriasis        Family History   Problem Relation Age of Onset    Diabetes Maternal Grandmother     Diabetes Paternal Grandfather     Cancer Father     Colon cancer Neg Hx        Past Surgical History:   Procedure Laterality Date    ABDOMINAL SURGERY      COLONOSCOPY      NV ARTHRD ANT INTERBODY DECOMPRESS CERVICAL BELW C2 Bilateral 1/26/2022    Procedure: C3/4 Anterior Cervical Discectomy and Fusion (neuromonitoring);  Surgeon: Agapito Mathur MD;  [facility];  Service: Neurosurgery    NV COLONOSCOPY FLX DX W/COLLJ SPEC WHEN PFRMD N/A 4/3/2019    Procedure: COLONOSCOPY;  Surgeon: Ramin Donovan MD;  [facility];  Service: Colorectal    NV LAPAROSCOPY COLECTOMY PARTIAL W/ANASTOMOSIS N/A 1/8/2020    Procedure: RESECTION COLON SIGMOID LAPAROSCOPIC, LAPAROSCOPIC SPLENIC FLEXURE MOBILIZATION;  Surgeon: Ramin Donovan MD;  [facility];  Service: Colorectal    NV SIGMOIDOSCOPY FLX DX W/COLLJ SPEC BR/WA IF PFRMD N/A 1/8/2020    Procedure: SIGMOIDOSCOPY FLEXIBLE;  Surgeon: Ramin Donovan MD;  [facility];  Service: Colorectal    TENDON REPAIR      right elbow        reports that he has quit smoking. His smoking use included cigarettes. He has a 60.0 pack-year smoking history. His smokeless tobacco use includes chew. He reports current alcohol use. He reports current drug use.  Drug: Marijuana.      Current Outpatient Medications:     Cholecalciferol (VITAMIN D) 2000 units CAPS, Take 1 capsule by mouth daily, Disp: , Rfl:     losartan (COZAAR) 50 mg tablet, TAKE 1 TABLET BY MOUTH EVERY DAY, Disp: 90 tablet, Rfl: 1    metFORMIN (GLUCOPHAGE) 500 mg tablet, TAKE 1 TABLET BY MOUTH EVERY DAY WITH BREAKFAST, Disp: 90 tablet, Rfl: 0    methocarbamol (ROBAXIN) 750 mg tablet, Take 1 tablet (750 mg total) by mouth every 6 (six) hours as needed for muscle spasms (neck pain), Disp: 30 tablet, Rfl: 3    Multiple Vitamins-Minerals (multivitamin with minerals) tablet, Take 1 tablet by mouth daily, Disp: , Rfl:     pregabalin (LYRICA) 150 mg capsule, Take 1 tablet in the morning and 1 tablet in the evening, Disp: 60 capsule, Rfl: 5    rosuvastatin (CRESTOR) 5 mg tablet, TAKE 1 TABLET (5 MG TOTAL) BY MOUTH DAILY., Disp: 90 tablet, Rfl: 1    acetaminophen (TYLENOL) 650 mg CR tablet, Take 1 tablet (650 mg total) by mouth every 8 (eight) hours as needed for mild pain (Patient not taking: Reported on 1/5/2023), Disp: 30 tablet, Rfl: 0    albuterol (PROVENTIL HFA,VENTOLIN HFA) 90 mcg/act inhaler, Inhale 2 puffs every 6 (six) hours as needed for wheezing or shortness of breath (Patient not taking: Reported on 3/10/2022), Disp: 1 Inhaler, Rfl: 0    ALPRAZolam (XANAX) 0.25 mg tablet, Take 1 tablet (0.25 mg total) by mouth daily at bedtime as needed for anxiety (Patient not taking: Reported on 7/1/2022), Disp: 10 tablet, Rfl: 0    budesonide-formoterol (SYMBICORT) 80-4.5 MCG/ACT inhaler, Inhale 2 puffs 2 (two) times a day Rinse mouth after use. (Patient not taking: Reported on 3/10/2022), Disp: 1 Inhaler, Rfl: 2    docusate sodium (COLACE) 100 mg capsule, Take 1 capsule (100 mg total) by mouth 2 (two) times a day (Patient not taking: Reported on 3/10/2022), Disp: 20 capsule, Rfl: 0    Hydrocortisone Butyrate 0.1 % CREA, as needed (Patient not taking: Reported on 12/2/2022), Disp: , Rfl: 3    methylPREDNISolone 4 MG  "tablet therapy pack, Use as directed on package (Patient not taking: Reported on 3/10/2022), Disp: 21 each, Rfl: 0    methylPREDNISolone 4 MG tablet therapy pack, Use as directed on package (Patient not taking: Reported on 6/29/2023), Disp: 1 each, Rfl: 0    tadalafil (CIALIS) 20 MG tablet, Take 1 tablet (20 mg total) by mouth daily as needed for erectile dysfunction, Disp: 30 tablet, Rfl: 4    Ustekinumab (STELARA SC), Inject under the skin every 3 (three) months (Patient not taking: Reported on 6/28/2023), Disp: , Rfl:     The following portions of the patient's history were reviewed and updated as appropriate: allergies, current medications, past family history, past medical history, past social history, past surgical history and problem list.    Review of Systems   Constitutional: Negative.    Respiratory: Negative.     Cardiovascular: Negative.            Objective:    /88   Pulse 74   Resp 16   Ht 5' 6\" (1.676 m)   Wt 110 kg (243 lb)   SpO2 98%   BMI 39.22 kg/m²     Repeat blood pressure is 130/80.      Physical Exam  Constitutional:       Appearance: Normal appearance.   Cardiovascular:      Heart sounds: Normal heart sounds.   Pulmonary:      Breath sounds: Normal breath sounds.           Recent Results (from the past 1008 hour(s))   Albumin / creatinine urine ratio    Collection Time: 01/22/24  9:29 AM   Result Value Ref Range    Creatinine, Ur 131.7 Reference range not established. mg/dL    Albumin,U,Random 7.6 <20.0 mg/L    Albumin Creat Ratio 6 0 - 30 mg/g creatinine   Comprehensive metabolic panel    Collection Time: 01/22/24  9:29 AM   Result Value Ref Range    Sodium 138 135 - 147 mmol/L    Potassium 4.4 3.5 - 5.3 mmol/L    Chloride 102 96 - 108 mmol/L    CO2 28 21 - 32 mmol/L    ANION GAP 8 mmol/L    BUN 14 5 - 25 mg/dL    Creatinine 0.94 0.60 - 1.30 mg/dL    Glucose, Fasting 121 (H) 65 - 99 mg/dL    Calcium 9.4 8.4 - 10.2 mg/dL    AST 21 13 - 39 U/L    ALT 40 7 - 52 U/L    Alkaline " Phosphatase 77 34 - 104 U/L    Total Protein 7.2 6.4 - 8.4 g/dL    Albumin 4.5 3.5 - 5.0 g/dL    Total Bilirubin 0.37 0.20 - 1.00 mg/dL    eGFR 96 ml/min/1.73sq m   Hemoglobin A1C    Collection Time: 01/22/24  9:29 AM   Result Value Ref Range    Hemoglobin A1C 6.8 (H) Normal 4.0-5.6%; PreDiabetic 5.7-6.4%; Diabetic >=6.5%; Glycemic control for adults with diabetes <7.0% %     mg/dl   Lipid panel    Collection Time: 01/22/24  9:29 AM   Result Value Ref Range    Cholesterol 168 See Comment mg/dL    Triglycerides 135 See Comment mg/dL    HDL, Direct 40 >=40 mg/dL    LDL Calculated 101 (H) 0 - 100 mg/dL    Non-HDL-Chol (CHOL-HDL) 128 mg/dl       Assessment/Plan:    No problem-specific Assessment & Plan notes found for this encounter.           Problem List Items Addressed This Visit          Endocrine    Controlled type 2 diabetes mellitus without complication, without long-term current use of insulin ([facility]) - Primary       Lab Results   Component Value Date    HGBA1C 6.8 (H) 01/22/2024     Patient is currently on metformin 500 mg daily.  Reports increased appetite and difficulty with portion control.  Patient states that he is unable to exercise due to issues with his neck and back.  Patient interested in starting Ozempic.  Denies any personal or family history of thyroid cancer.  Patient started on a low-dose of Ozempic.  Encouraged to continue with low-carb diet/exercise, frequent small meals.  Recommended to repeat metabolic panel at 3 months interval.         Relevant Medications    semaglutide, 0.25 or 0.5 mg/dose, (Ozempic, 0.25 or 0.5 MG/DOSE,) 2 mg/3 mL injection pen    Other Relevant Orders    Comprehensive metabolic panel    Hemoglobin A1c (w/out EAG) ([facility] ONLY)       Cardiovascular and Mediastinum    Essential hypertension     Blood pressure is at goal.  Continue losartan 50 milligram.            Other    Hyperlipidemia     Cholesterol panel improved significantly.  Continue Crestor 5 milligram          Relevant Orders    Lipid Panel with Direct LDL reflex    Erectile dysfunction     On Cialis 20 milligram.  Tolerating without any side effects.  Continue same.   Requesting a refill.   Patient requesting to be screened for testosterone deficiency due to persisting fatigue.         Relevant Medications    tadalafil (CIALIS) 20 MG tablet

## 2024-01-23 NOTE — ASSESSMENT & PLAN NOTE
Lab Results   Component Value Date    HGBA1C 6.8 (H) 01/22/2024     Patient is currently on metformin 500 mg daily.  Reports increased appetite and difficulty with portion control.  Patient states that he is unable to exercise due to issues with his neck and back.  Patient interested in starting Ozempic.  Denies any personal or family history of thyroid cancer.  Patient started on a low-dose of Ozempic.  Encouraged to continue with low-carb diet/exercise, frequent small meals.  Recommended to repeat metabolic panel at 3 months interval.

## 2024-01-31 ENCOUNTER — EVALUATION (OUTPATIENT)
Dept: PHYSICAL THERAPY | Facility: CLINIC | Age: 49
End: 2024-01-31
Attending: PHYSICIAN ASSISTANT
Payer: COMMERCIAL

## 2024-01-31 DIAGNOSIS — M25.521 RIGHT ELBOW PAIN: ICD-10-CM

## 2024-01-31 DIAGNOSIS — S46.211A BICEPS MUSCLE TEAR, RIGHT, INITIAL ENCOUNTER: Primary | ICD-10-CM

## 2024-01-31 PROCEDURE — 97162 PT EVAL MOD COMPLEX 30 MIN: CPT

## 2024-01-31 NOTE — PROGRESS NOTES
PT Evaluation     Today's date: 2024  Patient name: Gonzalo Mai  : 1975  MRN: [id number]  Referring provider: Kyrie Everett PA-C  Dx:   Encounter Diagnosis     ICD-10-CM    1. Biceps muscle tear, right, initial encounter  S46.211A       2. Right elbow pain  M25.521           Start Time: 1215  Stop Time: 1300  Total time in clinic (min): 45 minutes    Assessment  Assessment details: Gonzalo Mai is a pleasant 48 y.o. male who presents with partial R bicep tear.  The patient's greatest concerns are worry over not knowing what's wrong, concern at no signs of improvement, wanting to avoid surgery, fear of not being able to keep active, and future ill health (and wanting to prevent it).      No further referral appears necessary at this time based upon examination results.    Primary movement impairment diagnosis of decreased strength and activity tolerance resulting in pathoanatomical symptoms of decreased ROM and limiting his ability to carry, exercise or recreation, go to work, lift, perform household chores, perform yard work, reach overhead, sleep, and wash behind the back.    Primary Impairments:  1) decreased flexibility and ROM  2) decreased strength and WB intolerance    Etiologic factors include recent heavy lifting.    Impairments: abnormal muscle firing, abnormal or restricted ROM, activity intolerance, impaired physical strength, lacks appropriate home exercise program, pain with function and weight-bearing intolerance    Symptom irritability: moderateUnderstanding of Dx/Px/POC: good   Prognosis: good  Prognosis details: Positive prognostic indicators include positive attitude toward recovery and good understanding of diagnosis and treatment plan options.  Negative prognostic indicators include chronicity of symptoms, high symptom irritability, and minimal changes in pain with movement.      Goals  STG: to be achieved in 4-6 weeks  1)Reduce pain by 50% in R elbow  2)Improve pain free  with  turn/push strength by symmetrical bilaterally  3)Improve flexibility of bicep to WNL   4) Patient will be able to turn doorknob with decreased pain     LTG: to be achieved at discharge (6-8 weeks)  1)Improve FOTO to greater than or equal to predicted outcome  2)Improve ADL's in related activities to maximal level of function  3)Improve  strength to pain <3/10 with addition of pressure during drilling activities      Plan  Patient would benefit from: skilled physical therapy  Planned modality interventions: Modalities PRN.  Other planned modality interventions: epat  Planned therapy interventions: activity modification, manual therapy, neuromuscular re-education, patient education, therapeutic activities, therapeutic exercise, graded activity, home exercise program, behavior modification, self care, IASTM, joint mobilization and balance/weight bearing training  Frequency: 2x week  Duration in weeks: 8  Treatment plan discussed with: patient      Subjective Evaluation    History of Present Illness  Mechanism of injury: WORK/SCHOOL: contractor, lifting, heavy, hammering, sawing, etc  HOME LIFE: with family,   HOBBIES/EXERCISE: yuri, ATV, etc.  PLOF:  tennis elbow R side in past   HISTORY OF CURRENT INJURY:  Pt report that he had a pain come on when he quickly moved to catch a bag of concrete. Pt reports it happened around October.  He reports he took some time off since then to help it heal.  He continues with trouble into end range flexion.  RHD  PAIN LOCATION/DESCRIPTORS: Distal bicep, will shoot down forearm.    AGGRAVATING FACTORS:  Lifting, bending, holding something for period of time, lifting things, drilling,   EASES: ice, soaking in hot water, topical cream, pain medication,   DAY PATTERN: no time of day pattern  IMAGING: MRI  Gonzalo denies a new onset of Bladder incontinence, Bowel dysfunction, Dizziness, Dysphagia, Dysarthria, Drop attacks, Diplopia, Nausea, Ataxia, Recent unexplained weight loss, Clumsy  or unsteadiness, Constant night pain, History of cancer, Tingling, Numbness, and Saddle anesthesia .  PATIENT GOALS: back to normal,     Patient Goals  Patient goals for therapy: return to work, return to sport/leisure activities, independence with ADLs/IADLs, increased strength, increased motion, improved balance and decreased edema    Pain  Current pain ratin  At best pain ratin  At worst pain ratin  Quality: discomfort, radiating and throbbing  Relieving factors: ice, relaxation, rest, heat and medications  Aggravating factors: overhead activity, lifting and keyboarding        Objective     Palpation     Right   Tenderness of the biceps, brachialis and brachioradialis.     Neurological Testing     Additional Neurological Details  No neurological changes to note    Active Range of Motion     Left Elbow   Flexion: 130 degrees   Extension: 0 degrees   Forearm supination: WFL  Forearm pronation: WFL    Right Elbow   Flexion: 125 degrees with pain  Extension: 0 degrees   Forearm supination: WFL  Forearm pronation: WFL    Joint Play     Left Elbow   Joints within functional limits are the humeroulnar joint, humeroradial joint, proximal radioulnar joint and distal radioulnar joint.     Right Elbow   Joints within functional limits are the humeroulnar joint, humeroradial joint, proximal radioulnar joint and distal radioulnar joint.     Strength/Myotome Testing     Left Elbow   Flexion: 4+  Extension: 5  Forearm supination: 4  Forearm pronation: 4    Right Elbow   Flexion: 4-  Extension: 5  Forearm supination: 4  Forearm pronation: 4    Additional Strength Details  Pain with flexion R    General Comments:      Elbow Comments   Mild localized swelling noted superficial to medial epicondyle/medial aspect of distal bicep             Diagnosis: partial R bicep tear   Precautions:    Primary Goals: ROM, strength   *asterisks by exercise = given for HEP   Manuals        PROM        IASTM                                 There Ex        Bicep stretch doorway        Bicep stretch supine        Wrist flexor stretch        Wrist extensor stretch                                        UBE        Neuro Re-Ed        Wrist isometrics        Wrist extension        Wrist flexion        Supination         Pronation        Bicep curl        Hammer curl                                                 Re-evaluation              Ther Act              Education                           Modalities

## 2024-02-02 ENCOUNTER — OFFICE VISIT (OUTPATIENT)
Dept: PHYSICAL THERAPY | Facility: CLINIC | Age: 49
End: 2024-02-02
Attending: PHYSICIAN ASSISTANT
Payer: COMMERCIAL

## 2024-02-02 DIAGNOSIS — M25.521 RIGHT ELBOW PAIN: ICD-10-CM

## 2024-02-02 DIAGNOSIS — S46.211A BICEPS MUSCLE TEAR, RIGHT, INITIAL ENCOUNTER: Primary | ICD-10-CM

## 2024-02-02 PROCEDURE — 97110 THERAPEUTIC EXERCISES: CPT

## 2024-02-02 PROCEDURE — 97112 NEUROMUSCULAR REEDUCATION: CPT

## 2024-02-02 PROCEDURE — 97140 MANUAL THERAPY 1/> REGIONS: CPT

## 2024-02-02 NOTE — PROGRESS NOTES
"Daily Note     Today's date: 2024  Patient name: Gonzalo Mai  : 1975  MRN: [id number]  Referring provider: Kyrie Everett PA-C  Dx:   Encounter Diagnosis     ICD-10-CM    1. Biceps muscle tear, right, initial encounter  S46.211A       2. Right elbow pain  M25.521           Start Time: 0700  Stop Time: 0745  Total time in clinic (min): 45 minutes    Subjective: Pt reports that he feels about the same after his initial evaluation.        Objective: See treatment diary below      Assessment: Tolerated treatment well. Treatment today began with IASTM to distal bicep as well as lateral and medical epicondyle musculature.  Pt presented with some trigger points throughout musculature with good tolerance.  Pt continues with most pain during flexion past 90 deg.  Added stability as well as strengthening exercises with good tolerance. Patient would benefit from continued PT      Plan: Continue per plan of care.      Diagnosis: partial R bicep tear   Precautions:    Primary Goals: ROM, strength   *asterisks by exercise = given for HEP   Manuals 2/2       PROM        IASTM AK       Eccentric loading AK                       There Ex        Bicep stretch doorway        Bicep stretch supine        Wrist flexor stretch        Wrist extensor stretch 10x10\"                                       UBE 2'/2'       Neuro Re-Ed        Wrist isometrics        Wrist extension        Wrist flexion        Supination         Pronation        Bicep curl        Hammer curl        TRX Pulls 2x10        Shoulder taps 2x10 elevated surface       Hammer curls c isometric 2x10 10#       Walking unstable 2 laps 10#                                Re-evaluation              Ther Act              Education                           Modalities                                                                                          "

## 2024-02-07 ENCOUNTER — OFFICE VISIT (OUTPATIENT)
Dept: PHYSICAL THERAPY | Facility: CLINIC | Age: 49
End: 2024-02-07
Attending: PHYSICIAN ASSISTANT
Payer: COMMERCIAL

## 2024-02-07 DIAGNOSIS — S46.211A BICEPS MUSCLE TEAR, RIGHT, INITIAL ENCOUNTER: Primary | ICD-10-CM

## 2024-02-07 DIAGNOSIS — M25.521 RIGHT ELBOW PAIN: ICD-10-CM

## 2024-02-07 PROCEDURE — 97112 NEUROMUSCULAR REEDUCATION: CPT

## 2024-02-07 PROCEDURE — 97110 THERAPEUTIC EXERCISES: CPT

## 2024-02-07 PROCEDURE — 97140 MANUAL THERAPY 1/> REGIONS: CPT

## 2024-02-07 NOTE — PROGRESS NOTES
"Daily Note     Today's date: 2024  Patient name: Gonzalo Mai  : 1975  MRN: [id number]  Referring provider: Kyrie Everett PA-C  Dx:   Encounter Diagnosis     ICD-10-CM    1. Biceps muscle tear, right, initial encounter  S46.211A       2. Right elbow pain  M25.521           Start Time: 1015  Stop Time: 1100  Total time in clinic (min): 45 minutes    Subjective: Pt reports that he felt good after last treatment session but pain had returned that evening.        Objective: See treatment diary below      Assessment: Tolerated treatment well. Began with EPAT machine to address distal bicep.  Moved into stretching activities for wrist flexors and extensors with good tolerance.  Moved onto bicep curls, hammer curls, as well as tricep extension with emphasis on eccentric contraction.  Added bicep stretch as well with good tolerance. Patient would benefit from continued PT      Plan: Continue per plan of care.      Diagnosis: partial R bicep tear   Precautions:    Primary Goals: ROM, strength   *asterisks by exercise = given for HEP   Manuals       PROM        IASTM AK       Eccentric loading AK       Gapping mobs  AK              There Ex        Bicep stretch doorway  10x10\"      Bicep stretch supine        Wrist flexor stretch  10x10\"      Wrist extensor stretch 10x10\" 10x10\"                                      UBE 2'/2'       Neuro Re-Ed        Wrist isometrics        Wrist extension        Wrist flexion        Supination         Pronation        Bicep curl  1x10 kieser 15#      Hammer curl  1x10 kieser 8#      TRX Pulls 2x10        Shoulder taps 2x10 elevated surface       Hammer curls c isometric 2x10 10#       Walking unstable 2 laps 10#       Tricep extension   1x10 kieser 12#                       Re-evaluation              Ther Act              Education                           Modalities             EPAT  AK 2 rounds                                                                             "

## 2024-02-09 ENCOUNTER — OFFICE VISIT (OUTPATIENT)
Dept: PHYSICAL THERAPY | Facility: CLINIC | Age: 49
End: 2024-02-09
Attending: PHYSICIAN ASSISTANT
Payer: COMMERCIAL

## 2024-02-09 DIAGNOSIS — M25.521 RIGHT ELBOW PAIN: ICD-10-CM

## 2024-02-09 DIAGNOSIS — S46.211A BICEPS MUSCLE TEAR, RIGHT, INITIAL ENCOUNTER: Primary | ICD-10-CM

## 2024-02-09 PROCEDURE — 97110 THERAPEUTIC EXERCISES: CPT

## 2024-02-09 PROCEDURE — 97140 MANUAL THERAPY 1/> REGIONS: CPT

## 2024-02-09 NOTE — PROGRESS NOTES
"Daily Note     Today's date: 2024  Patient name: Gonzalo Mai  : 1975  MRN: [id number]  Referring provider: Kyrie Everett PA-C  Dx:   Encounter Diagnosis     ICD-10-CM    1. Biceps muscle tear, right, initial encounter  S46.211A       2. Right elbow pain  M25.521           Start Time: 1030  Stop Time: 2315  Total time in clinic (min): 765 minutes    Subjective: Pt reports that things have been feeling better for longer periods of time, though continues with some discomfort.        Objective: See treatment diary below      Assessment: Tolerated treatment well. Today's focus was on stretching as well as manual interventions to decrease pain and discomfort that comes on with supination, as well as end range flexion.  Added mobilizations in supine for elbow gapping today as well as supination mobilizations with good tolerance.  Pt presented with decreased tightness within bicep muscle today as compared to last treatment session.  Patient would benefit from continued PT      Plan: Continue per plan of care.      Diagnosis: partial R bicep tear   Precautions:    Primary Goals: ROM, strength   *asterisks by exercise = given for HEP   Manuals      PROM        IASTM AK  AK     Eccentric loading AK       Gapping mobs  AK AK     Supine gapping   AK     Proximal supination   AK             There Ex        Bicep stretch doorway  10x10\" 10x10\"     Bicep stretch supine        Wrist flexor stretch  10x10\"      Wrist extensor stretch 10x10\" 10x10\"      Supination stretch   10x10\"     Pronation stretch   10x10\"                     UBE 2'/2'  2'/2'     Neuro Re-Ed        Wrist isometrics        Wrist extension        Wrist flexion        Supination         Pronation        Bicep curl  1x10 kieser 15#      Hammer curl  1x10 kieser 8#      TRX Pulls 2x10        Shoulder taps 2x10 elevated surface       Hammer curls c isometric 2x10 10#       Walking unstable 2 laps 10#       Tricep extension   1x10 kieser 12#    "                    Re-evaluation              Ther Act              Education      self gapping technique                     Modalities             EPAT  AK 2 rounds

## 2024-02-14 ENCOUNTER — OFFICE VISIT (OUTPATIENT)
Dept: PHYSICAL THERAPY | Facility: CLINIC | Age: 49
End: 2024-02-14
Attending: PHYSICIAN ASSISTANT
Payer: COMMERCIAL

## 2024-02-14 DIAGNOSIS — S46.211A BICEPS MUSCLE TEAR, RIGHT, INITIAL ENCOUNTER: Primary | ICD-10-CM

## 2024-02-14 PROCEDURE — 97140 MANUAL THERAPY 1/> REGIONS: CPT

## 2024-02-14 PROCEDURE — 97110 THERAPEUTIC EXERCISES: CPT

## 2024-02-14 PROCEDURE — 97112 NEUROMUSCULAR REEDUCATION: CPT

## 2024-02-14 NOTE — PROGRESS NOTES
"Daily Note     Today's date: 2024  Patient name: Gonzalo Mai  : 1975  MRN: [id number]  Referring provider: Kyrie Everett PA-C  Dx:   Encounter Diagnosis     ICD-10-CM    1. Biceps muscle tear, right, initial encounter  S46.211A           Start Time: 1015  Stop Time: 1100  Total time in clinic (min): 45 minutes    Subjective: Pt reports that he had been helping his family with moving some boxes over the weekend as well as shoveling some.       Objective: See treatment diary below      Assessment: Tolerated treatment well. Pt began with some pain discomfort beginning treatment session.  Had warm up and followed up with epat with good tolerance.  Stretching tolerated well as well as strengthening.  Pt presented with increased  Patient would benefit from continued PT      Plan: Continue per plan of care.      Diagnosis: partial R bicep tear   Precautions:    Primary Goals: ROM, strength   *asterisks by exercise = given for HEP   Manuals     PROM        IASTM AK  AK     Eccentric loading AK       Gapping mobs  AK AK AK    Supine gapping   AK     Proximal supination   AK             There Ex        Bicep stretch doorway  10x10\" 10x10\" 10x10\"    Bicep stretch supine        Wrist flexor stretch  10x10\"  10x10\"    Wrist extensor stretch 10x10\" 10x10\"  10x10\"    Supination stretch   10x10\" 10x10\"    Pronation stretch   10x10\" 10x10\"                    UBE 2'/2'  2'/2' 2.5'/2.5'    Neuro Re-Ed        Wrist isometrics        Wrist extension        Wrist flexion        Supination         Pronation        Bicep curl  1x10 kieser 15#      Hammer curl  1x10 kieser 8#  2x10 kieser 5#    TRX Pulls 2x10        Shoulder taps 2x10 elevated surface       Hammer curls c isometric 2x10 10#       Walking unstable 2 laps 10#       Tricep extension   1x10 kieser 12#  2x10 kieser 10#, eccentric control                     Re-evaluation              Ther Act              Education      self gapping technique        "              Modalities             EPAT  AK 2 rounds  AK 2 rounds

## 2024-02-16 ENCOUNTER — OFFICE VISIT (OUTPATIENT)
Dept: PHYSICAL THERAPY | Facility: CLINIC | Age: 49
End: 2024-02-16
Attending: PHYSICIAN ASSISTANT
Payer: COMMERCIAL

## 2024-02-16 DIAGNOSIS — S46.211A BICEPS MUSCLE TEAR, RIGHT, INITIAL ENCOUNTER: Primary | ICD-10-CM

## 2024-02-16 DIAGNOSIS — M25.521 RIGHT ELBOW PAIN: ICD-10-CM

## 2024-02-16 PROCEDURE — 97140 MANUAL THERAPY 1/> REGIONS: CPT

## 2024-02-16 PROCEDURE — 97112 NEUROMUSCULAR REEDUCATION: CPT

## 2024-02-16 PROCEDURE — 97110 THERAPEUTIC EXERCISES: CPT

## 2024-02-16 NOTE — PROGRESS NOTES
"Daily Note     Today's date: 2024  Patient name: Gonzalo Mai  : 1975  MRN: [id number]  Referring provider: Kyrie Everett PA-C  Dx:   Encounter Diagnosis     ICD-10-CM    1. Biceps muscle tear, right, initial encounter  S46.211A       2. Right elbow pain  M25.521           Start Time: 1115  Stop Time: 1200  Total time in clinic (min): 45 minutes    Subjective: Pt has been feeling good after treatments, but pain returns afterward by the next day.       Objective: See treatment diary below      Assessment: Tolerated treatment well. After discussions of benefits and risks, precautions and contraindications, patient elected to participate in personalized blood flow restriction training. BFR set up and performed under the direct supervision of (PT that is certified).  Red cuff used for BFR.   BFR:   197 mmHg LOP, 86 mmHg PTP for skull crushers and hammer curl exercise. Added stretching as well.  Pt tolerated treatment well.    Patient would benefit from continued PT      Plan: Continue per plan of care.      Diagnosis: partial R bicep tear   Precautions:    Primary Goals: ROM, strength   *asterisks by exercise = given for HEP   Manuals    PROM        IASTM AK  AK  AK   Eccentric loading AK       Gapping mobs  AK AK AK    Supine gapping   AK     Proximal supination   AK             There Ex        Bicep stretch doorway  10x10\" 10x10\" 10x10\" 10x10\"   Bicep stretch supine        Wrist flexor stretch  10x10\"  10x10\" 10x10\"   Wrist extensor stretch 10x10\" 10x10\"  10x10\" 10x10\"   Supination stretch   10x10\" 10x10\" 10x10\"   Pronation stretch   10x10\" 10x10\" 10x10\"                   UBE 2'/2'  2'/2' 2.5'/2.5' 2.5'/2.5'   Neuro Re-Ed        Wrist isometrics        Wrist extension        Wrist flexion        Supination         Pronation        Bicep curl  1x10 kieser 15#      Hammer curl  1x10 kieser 8#  2x10 kieser 5# 4x10 BFR 10#   TRX Pulls 2x10        Shoulder taps 2x10 elevated surface     "   Hammer curls c isometric 2x10 10#       Walking unstable 2 laps 10#       Tricep extension   1x10 kieser 12#  2x10 kieser 10#, eccentric control    Skull crushers     4x15 10#            Re-evaluation              Ther Act              Education      self gapping technique                     Modalities             EPAT  AK 2 rounds  AK 2 rounds

## 2024-02-21 ENCOUNTER — OFFICE VISIT (OUTPATIENT)
Dept: PHYSICAL THERAPY | Facility: CLINIC | Age: 49
End: 2024-02-21
Attending: PHYSICIAN ASSISTANT
Payer: COMMERCIAL

## 2024-02-21 DIAGNOSIS — M25.521 RIGHT ELBOW PAIN: ICD-10-CM

## 2024-02-21 DIAGNOSIS — S46.211A BICEPS MUSCLE TEAR, RIGHT, INITIAL ENCOUNTER: Primary | ICD-10-CM

## 2024-02-21 PROCEDURE — 97112 NEUROMUSCULAR REEDUCATION: CPT

## 2024-02-21 PROCEDURE — 97140 MANUAL THERAPY 1/> REGIONS: CPT

## 2024-02-21 PROCEDURE — 97110 THERAPEUTIC EXERCISES: CPT

## 2024-02-21 NOTE — PROGRESS NOTES
"Daily Note     Today's date: 2024  Patient name: Gonzalo Mai  : 1975  MRN: [id number]  Referring provider: Kyrie Everett PA-C  Dx:   Encounter Diagnosis     ICD-10-CM    1. Biceps muscle tear, right, initial encounter  S46.211A       2. Right elbow pain  M25.521           Start Time: 0800  Stop Time: 0845  Total time in clinic (min): 45 minutes    Subjective: Pt reports that he had mainly muscular fatigue and discomfort with having put in walter.       Objective: See treatment diary below      Assessment: Tolerated treatment well. Began with warm up followed by manual interventions.  Pt responded well to epat machine today.  Continued strengthening with kettle bell to increase moment arm, working on  strength as well.  Pt responded well to exercises today with some muscular fatigue noted at end of treatment session.  Patient would benefit from continued PT      Plan: Continue per plan of care.      Diagnosis: partial R bicep tear   Precautions:    Primary Goals: ROM, strength   *asterisks by exercise = given for HEP   Manuals    PROM        IASTM   AK  AK   Eccentric loading        Gapping mobs   AK AK    Supine gapping   AK     Proximal supination   AK             There Ex        Bicep stretch doorway 10x10\"  10x10\" 10x10\" 10x10\"   Bicep stretch supine        Wrist flexor stretch 10x10\"   10x10\" 10x10\"   Wrist extensor stretch 10x10\"   10x10\" 10x10\"   Supination stretch 10x10\"  10x10\" 10x10\" 10x10\"   Pronation stretch 10x10\"  10x10\" 10x10\" 10x10\"                   UBE 2.5'/2.5'  2'/2' 2.5'/2.5' 2.5'/2.5'   Neuro Re-Ed        Wrist isometrics        Wrist extension 2x20 5# kettle bell       Wrist flexion 2x20 5# kettle bell       Supination  2x20 10# kettle bell       Pronation 2x20 10# kettle bell       Bicep curl        Hammer curl    2x10 kieser 5# 4x10 BFR 10#   TRX Pulls        Shoulder taps        Hammer curls c isometric        Walking unstable        Tricep extension    "  2x10 kieser 10#, eccentric control    Skull crushers     4x15 10#            Re-evaluation            Ther Act            Education    self gapping technique                   Modalities           EPAT AK 2 rounds   AK 2 rounds

## 2024-02-23 ENCOUNTER — OFFICE VISIT (OUTPATIENT)
Dept: PHYSICAL THERAPY | Facility: CLINIC | Age: 49
End: 2024-02-23
Attending: PHYSICIAN ASSISTANT
Payer: COMMERCIAL

## 2024-02-23 DIAGNOSIS — S46.211A BICEPS MUSCLE TEAR, RIGHT, INITIAL ENCOUNTER: Primary | ICD-10-CM

## 2024-02-23 DIAGNOSIS — M25.521 RIGHT ELBOW PAIN: ICD-10-CM

## 2024-02-23 DIAGNOSIS — N52.9 ERECTILE DYSFUNCTION, UNSPECIFIED ERECTILE DYSFUNCTION TYPE: ICD-10-CM

## 2024-02-23 PROCEDURE — 97110 THERAPEUTIC EXERCISES: CPT

## 2024-02-23 PROCEDURE — 97112 NEUROMUSCULAR REEDUCATION: CPT

## 2024-02-23 RX ORDER — TADALAFIL 20 MG/1
20 TABLET ORAL DAILY PRN
Qty: 30 TABLET | Refills: 4 | Status: SHIPPED | OUTPATIENT
Start: 2024-02-23

## 2024-02-23 NOTE — TELEPHONE ENCOUNTER
Pt stopped in the office an dis requesting [facility]

## 2024-02-23 NOTE — PROGRESS NOTES
"Daily Note     Today's date: 2024  Patient name: Gonzalo Mai  : 1975  MRN: [id number]  Referring provider: Kyrie Everett PA-C  Dx:   Encounter Diagnosis     ICD-10-CM    1. Biceps muscle tear, right, initial encounter  S46.211A       2. Right elbow pain  M25.521           Start Time: 1115  Stop Time: 1200  Total time in clinic (min): 45 minutes    Subjective: Pt reports that he continues with some pain in the bicep though decreased intensity.       Objective: See treatment diary below      Assessment: Tolerated treatment well. Pt tolerated treatment well today.  After warm up, added IASTM to musculature as well as stretching.  After discussions of benefits and risks, precautions and contraindications, patient elected to participate in personalized blood flow restriction training. BFR set up and performed under the direct supervision of (PT that is certified).  Red cuff used for BFR.   BFR:   171 mmHg LOP, 86 mmHg PTP for supination/pronation, wrist extension, bodyblade ulnar/radial deviation exercise  Patient demonstrated fatigue post treatment      Plan: Continue per plan of care.      Diagnosis: partial R bicep tear   Precautions:    Primary Goals: ROM, strength   *asterisks by exercise = given for HEP   Manuals    PROM        IASTM  AK   AK   Eccentric loading        Gapping mobs    AK    Supine gapping        Proximal supination                There Ex        Bicep stretch doorway 10x10\" 10x10\"  10x10\" 10x10\"   Bicep stretch supine        Wrist flexor stretch 10x10\" 10x10\"  10x10\" 10x10\"   Wrist extensor stretch 10x10\" 10x10\"  10x10\" 10x10\"   Supination stretch 10x10\" 10x10\"  10x10\" 10x10\"   Pronation stretch 10x10\" 10x10\"  10x10\" 10x10\"                   UBE 2.5'/2.5' 2.5'/2.5'  2.5'/2.5' 2.5'/2.5'   Neuro Re-Ed        Wrist isometrics        Wrist extension 2x20 5# kettle bell 2x20 5# kettle bell      Wrist flexion 2x20 5# kettle bell 2x20 5# kettle bell      Supination  2x20 " 10# kettle bell 2x20 10# kettle bell      Pronation 2x20 10# kettle bell 2x20 10# kettle bell      Bicep curl        Hammer curl    2x10 kieser 5# 4x10 BFR 10#   TRX Pulls        Shoulder taps        Hammer curls c isometric        Walking unstable        Tricep extension     2x10 kieser 10#, eccentric control    Skull crushers     4x15 10#   Body blade  Ulnar/radial deviation 4x1'               Re-evaluation           Ther Act           Education                     Modalities          EPAT AK 2 rounds   AK 2 rounds

## 2024-02-28 ENCOUNTER — OFFICE VISIT (OUTPATIENT)
Dept: PHYSICAL THERAPY | Facility: CLINIC | Age: 49
End: 2024-02-28
Attending: PHYSICIAN ASSISTANT
Payer: COMMERCIAL

## 2024-02-28 DIAGNOSIS — M25.521 RIGHT ELBOW PAIN: ICD-10-CM

## 2024-02-28 DIAGNOSIS — S46.211A BICEPS MUSCLE TEAR, RIGHT, INITIAL ENCOUNTER: Primary | ICD-10-CM

## 2024-02-28 PROCEDURE — 97112 NEUROMUSCULAR REEDUCATION: CPT

## 2024-02-28 PROCEDURE — 97140 MANUAL THERAPY 1/> REGIONS: CPT

## 2024-02-28 NOTE — PROGRESS NOTES
"Daily Note     Today's date: 2024  Patient name: Gonzalo Mai  : 1975  MRN: [id number]  Referring provider: Kyrie Everett PA-C  Dx:   Encounter Diagnosis     ICD-10-CM    1. Biceps muscle tear, right, initial encounter  S46.211A       2. Right elbow pain  M25.521           Start Time: 2215  Stop Time: 2300  Total time in clinic (min): 45 minutes    Subjective: Pt reports that when his discomfort comes on with elbow flexion activities, but it decreases after stretching most of the time now.        Objective: See treatment diary below      Assessment: Tolerated treatment well. Treatment today began with warm up on bike.  Followed up with ept with good tolerance.  Able to add in stretching for bicep and wrist musculature.  Pt able to perform skull crushers today with increased weight with good tolerance.   Patient would benefit from continued PT      Plan: Continue per plan of care.      Diagnosis: partial R bicep tear   Precautions:    Primary Goals: ROM, strength   *asterisks by exercise = given for HEP   Manuals    PROM        IASTM  AK   AK   Eccentric loading        Gapping mobs   AK     Supine gapping        Proximal supination                There Ex        Bicep stretch doorway 10x10\" 10x10\" 10x10\"  10x10\"   Bicep stretch supine        Wrist flexor stretch 10x10\" 10x10\" 10x10\"  10x10\"   Wrist extensor stretch 10x10\" 10x10\" 10x10\"  10x10\"   Supination stretch 10x10\" 10x10\" 10x10\"  10x10\"   Pronation stretch 10x10\" 10x10\" 10x10\"  10x10\"                   UBE 2.5'/2.5' 2.5'/2.5' 2.5'/2.5'  2.5'/2.5'   Neuro Re-Ed        Wrist isometrics        Wrist extension 2x20 5# kettle bell 2x20 5# kettle bell 2x20 5# kettle bell     Wrist flexion 2x20 5# kettle bell 2x20 5# kettle bell 2x20 5# kettle bell     Radial deviation   2x20 5# kettle bell     Supination  2x20 10# kettle bell 2x20 10# kettle bell      Pronation 2x20 10# kettle bell 2x20 10# kettle bell      Bicep curl        Hammer " curl     4x10 BFR 10#   TRX Pulls        Shoulder taps        Hammer curls c isometric        Walking unstable        Tricep extension         Skull crushers     4x15 10#   Body blade  Ulnar/radial deviation 4x1'      Skull crushers   3x10 10#     Hammer to bicep curl   3x10 10#              Re-evaluation          Ther Act          Education                   Modalities         EPAT AK 2 rounds  AK 2 rounds 2.4

## 2024-03-01 ENCOUNTER — OFFICE VISIT (OUTPATIENT)
Dept: PHYSICAL THERAPY | Facility: CLINIC | Age: 49
End: 2024-03-01
Attending: PHYSICIAN ASSISTANT
Payer: COMMERCIAL

## 2024-03-01 DIAGNOSIS — M25.521 RIGHT ELBOW PAIN: ICD-10-CM

## 2024-03-01 DIAGNOSIS — S46.211A BICEPS MUSCLE TEAR, RIGHT, INITIAL ENCOUNTER: Primary | ICD-10-CM

## 2024-03-01 PROCEDURE — 97110 THERAPEUTIC EXERCISES: CPT

## 2024-03-01 PROCEDURE — 97112 NEUROMUSCULAR REEDUCATION: CPT

## 2024-03-01 NOTE — PROGRESS NOTES
"Daily Note     Today's date: 3/1/2024  Patient name: Gonzalo Mai  : 1975  MRN: [id number]  Referring provider: Kyrie Everett PA-C  Dx:   Encounter Diagnosis     ICD-10-CM    1. Biceps muscle tear, right, initial encounter  S46.211A       2. Right elbow pain  M25.521           Start Time: 1115  Stop Time: 1200  Total time in clinic (min): 45 minutes    Subjective: Pt reports that he has some muscle soreness coming in today, but feels it is more of having worked it rather than his typical pain.       Objective: See treatment diary below      Assessment: Tolerated treatment well. Patient would benefit from continued PTBegan with warm up on UBE followed by manual interventions and stretching afterward. After discussions of benefits and risks, precautions and contraindications, patient elected to participate in personalized blood flow restriction training. BFR set up and performed under the direct supervision of (PT that is certified).  Red cuff used for BFR.   BFR:   179 mmHg LOP, 87 mmHg PTP for hammer curl exercise.    Pt had some lightheadedness and needed a rest break, though his BP presented at 135/78, and lightheadedness decreased after rest break and water.  Continued with stabilization activities with some fatigue noted.  Added wrist extensor and flexor strengthening again as well.          Plan: Continue per plan of care.      Diagnosis: partial R bicep tear   Precautions:    Primary Goals: ROM, strength   *asterisks by exercise = given for HEP   Manuals 2/21 2/23 2/28 3/1    PROM        IASTM  AK  AK    Eccentric loading        Gapping mobs   AK     Supine gapping        Proximal supination                There Ex        Bicep stretch doorway 10x10\" 10x10\" 10x10\" 10x10\"    Bicep stretch supine        Wrist flexor stretch 10x10\" 10x10\" 10x10\" 10x10\"    Wrist extensor stretch 10x10\" 10x10\" 10x10\" 10x10\"    Supination stretch 10x10\" 10x10\" 10x10\" 10x10\"    Pronation stretch 10x10\" 10x10\" 10x10\" 10x10\"  "                   UBE 2.5'/2.5' 2.5'/2.5' 2.5'/2.5' 2.5'/2.5'    Neuro Re-Ed        Wrist isometrics        Wrist extension 2x20 5# kettle bell 2x20 5# kettle bell 2x20 5# kettle bell 2x20 5# kettle bell    Wrist flexion 2x20 5# kettle bell 2x20 5# kettle bell 2x20 5# kettle bell 2x20 5# kettle bell    Radial deviation   2x20 5# kettle bell 2x20 5# kettle bell    Supination  2x20 10# kettle bell 2x20 10# kettle bell  2x20 5# kettle bell    Pronation 2x20 10# kettle bell 2x20 10# kettle bell  2x20 5# kettle bell    Bicep curl        Hammer curl        TRX Pulls        Shoulder taps        Hammer curls c isometric        Walking unstable        Tricep extension         Skull crushers        Body blade  Ulnar/radial deviation 4x1'  Ulnar/radial deviation 4x1'    Skull crushers   3x10 10#     Hammer to bicep curl   3x10 10# 3x10 10# BFR             Re-evaluation         Ther Act         Education                 Modalities        EPAT AK 2 rounds  AK 2 rounds 2.4

## 2024-03-06 ENCOUNTER — OFFICE VISIT (OUTPATIENT)
Dept: PHYSICAL THERAPY | Facility: CLINIC | Age: 49
End: 2024-03-06
Attending: PHYSICIAN ASSISTANT
Payer: COMMERCIAL

## 2024-03-06 DIAGNOSIS — M25.521 RIGHT ELBOW PAIN: ICD-10-CM

## 2024-03-06 DIAGNOSIS — S46.211A BICEPS MUSCLE TEAR, RIGHT, INITIAL ENCOUNTER: Primary | ICD-10-CM

## 2024-03-06 PROCEDURE — 97112 NEUROMUSCULAR REEDUCATION: CPT

## 2024-03-06 NOTE — PROGRESS NOTES
"Daily Note     Today's date: 3/6/2024  Patient name: Gonzalo Mai  : 1975  MRN: [id number]  Referring provider: Kyrie Everett PA-C  Dx:   Encounter Diagnosis     ICD-10-CM    1. Biceps muscle tear, right, initial encounter  S46.211A       2. Right elbow pain  M25.521           Start Time: 1730  Stop Time: 1815  Total time in clinic (min): 45 minutes    Subjective: Pt reports that he had done very well at work.  He reports that he has been watching at and giving it rest breaks, but that even with the more challenging activities at work, he hasn't had as significant and intense of pain that he had been having.        Objective: See treatment diary below      Assessment: Tolerated treatment well. Began with warm up followed by stretching.  Added epat machine with good tolerance, pt having decreased intensity of pain throughout.  Continued to strengthening as well with good tolerance.  Pt had decrease stiffness and soreness today. Patient would benefit from continued PT      Plan: Continue per plan of care.      Diagnosis: partial R bicep tear   Precautions:    Primary Goals: ROM, strength   *asterisks by exercise = given for HEP   Manuals 2/21 2/23 2/28 3/1 3/6   PROM        IASTM  AK  AK    Eccentric loading        Gapping mobs   AK     Supine gapping        Proximal supination                There Ex        Bicep stretch doorway 10x10\" 10x10\" 10x10\" 10x10\" 10x10\"   Bicep stretch supine        Wrist flexor stretch 10x10\" 10x10\" 10x10\" 10x10\" 10x10\"   Wrist extensor stretch 10x10\" 10x10\" 10x10\" 10x10\" 10x10\"   Supination stretch 10x10\" 10x10\" 10x10\" 10x10\" 10x10\"   Pronation stretch 10x10\" 10x10\" 10x10\" 10x10\" 10x10\"                   UBE 2.5'/2.5' 2.5'/2.5' 2.5'/2.5' 2.5'/2.5' 2.5'/2.5'   Neuro Re-Ed        Wrist isometrics        Wrist extension 2x20 5# kettle bell 2x20 5# kettle bell 2x20 5# kettle bell 2x20 5# kettle bell 2x20 5# kettle bell   Wrist flexion 2x20 5# kettle bell 2x20 5# kettle bell 2x20 5# " kettle bell 2x20 5# kettle bell 2x20 5# kettle bell   Radial deviation   2x20 5# kettle bell 2x20 5# kettle bell 2x20 5# kettle bell   Supination  2x20 10# kettle bell 2x20 10# kettle bell  2x20 5# kettle bell 2x20 5# kettle bell   Pronation 2x20 10# kettle bell 2x20 10# kettle bell  2x20 5# kettle bell 2x20 5# kettle bell   Bicep curl        Hammer curl        TRX Pulls        Shoulder taps        Hammer curls c isometric        Walking unstable        Tricep extension      X10# 2x10 c scap squeeze   Skull crushers        Body blade  Ulnar/radial deviation 4x1'  Ulnar/radial deviation 4x1'    Skull crushers   3x10 10#     Hammer to bicep curl   3x10 10# 3x10 10# BFR             Re-evaluation         Ther Act         Education                 Modalities        EPAT AK 2 rounds  AK 2 rounds 2.4  AK 2 rounds 2.4

## 2024-03-13 ENCOUNTER — APPOINTMENT (OUTPATIENT)
Dept: PHYSICAL THERAPY | Facility: CLINIC | Age: 49
End: 2024-03-13
Attending: PHYSICIAN ASSISTANT
Payer: COMMERCIAL

## 2024-03-18 ENCOUNTER — OFFICE VISIT (OUTPATIENT)
Dept: PHYSICAL THERAPY | Facility: CLINIC | Age: 49
End: 2024-03-18
Attending: PHYSICIAN ASSISTANT
Payer: COMMERCIAL

## 2024-03-18 DIAGNOSIS — S46.211A BICEPS MUSCLE TEAR, RIGHT, INITIAL ENCOUNTER: Primary | ICD-10-CM

## 2024-03-18 DIAGNOSIS — M25.521 RIGHT ELBOW PAIN: ICD-10-CM

## 2024-03-18 PROCEDURE — 97140 MANUAL THERAPY 1/> REGIONS: CPT

## 2024-03-18 PROCEDURE — 97110 THERAPEUTIC EXERCISES: CPT

## 2024-03-18 NOTE — PROGRESS NOTES
"Daily Note     Today's date: 3/18/2024  Patient name: Gonzalo Mai  : 1975  MRN: [id number]  Referring provider: Kyrie Everett PA-C  Dx:   Encounter Diagnosis     ICD-10-CM    1. Biceps muscle tear, right, initial encounter  S46.211A       2. Right elbow pain  M25.521           Start Time: 181  Stop Time: 1900  Total time in clinic (min): 45 minutes    Subjective: Pt reports that he believes he hurt himself again during work, it has been feeling better since he had a rest break for the last week.       Objective: See treatment diary below      Assessment: Tolerated treatment well. Treatment today consisted primarily of pain modulation.  Added IASTM to musculature of bicep with good tolerance.  Added epat today with good tolerance as well though some noted tightness. Added stretching and ended with ice.  Patient would benefit from continued PT      Plan: Continue per plan of care.      Diagnosis: partial R bicep tear   Precautions:    Primary Goals: ROM, strength   *asterisks by exercise = given for HEP   Manuals 3/18 2/23 2/28 3/1 3/6   PROM        IASTM AK AK  AK    Eccentric loading        Gapping mobs   AK     Supine gapping        Proximal supination                There Ex        Bicep stretch doorway 10x10\" 10x10\" 10x10\" 10x10\" 10x10\"   Bicep stretch supine        Wrist flexor stretch 10x10\" 10x10\" 10x10\" 10x10\" 10x10\"   Wrist extensor stretch 10x10\" 10x10\" 10x10\" 10x10\" 10x10\"   Supination stretch 10x10\" 10x10\" 10x10\" 10x10\" 10x10\"   Pronation stretch 10x10\" 10x10\" 10x10\" 10x10\" 10x10\"                   UBE  2.5'/2.5' 2.5'/2.5' 2.5'/2.5' 2.5'/2.5'   Neuro Re-Ed        Wrist isometrics        Wrist extension  2x20 5# kettle bell 2x20 5# kettle bell 2x20 5# kettle bell 2x20 5# kettle bell   Wrist flexion  2x20 5# kettle bell 2x20 5# kettle bell 2x20 5# kettle bell 2x20 5# kettle bell   Radial deviation   2x20 5# kettle bell 2x20 5# kettle bell 2x20 5# kettle bell   Supination   2x20 10# kettle bell  " 2x20 5# kettle bell 2x20 5# kettle bell   Pronation  2x20 10# kettle bell  2x20 5# kettle bell 2x20 5# kettle bell   Bicep curl        Hammer curl        TRX Pulls        Shoulder taps        Hammer curls c isometric        Walking unstable        Tricep extension      X10# 2x10 c scap squeeze   Skull crushers        Body blade  Ulnar/radial deviation 4x1'  Ulnar/radial deviation 4x1'    Skull crushers   3x10 10#     Hammer to bicep curl   3x10 10# 3x10 10# BFR             Re-evaluation         Ther Act         Education                 Modalities        EPAT AK 2 rounds 2.4  AK 2 rounds 2.4  AK 2 rounds 2.4

## 2024-03-19 ENCOUNTER — APPOINTMENT (OUTPATIENT)
Dept: RADIOLOGY | Facility: CLINIC | Age: 49
End: 2024-03-19
Payer: COMMERCIAL

## 2024-03-19 ENCOUNTER — OFFICE VISIT (OUTPATIENT)
Dept: URGENT CARE | Facility: CLINIC | Age: 49
End: 2024-03-19
Payer: COMMERCIAL

## 2024-03-19 VITALS
RESPIRATION RATE: 15 BRPM | HEART RATE: 62 BPM | WEIGHT: 230 LBS | HEIGHT: 67 IN | DIASTOLIC BLOOD PRESSURE: 86 MMHG | OXYGEN SATURATION: 97 % | BODY MASS INDEX: 36.1 KG/M2 | TEMPERATURE: 98.1 F | SYSTOLIC BLOOD PRESSURE: 169 MMHG

## 2024-03-19 DIAGNOSIS — M25.474 SWELLING OF FIRST METATARSOPHALANGEAL (MTP) JOINT OF RIGHT FOOT: Primary | ICD-10-CM
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
## 2024-03-19 PROCEDURE — 73630 X-RAY EXAM OF FOOT: CPT

## 2024-03-19 PROCEDURE — G0382 LEV 3 HOSP TYPE B ED VISIT: HCPCS | Performed by: STUDENT IN AN ORGANIZED HEALTH CARE EDUCATION/TRAINING PROGRAM
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
## 2024-03-20 DIAGNOSIS — E11.9 CONTROLLED TYPE 2 DIABETES MELLITUS WITHOUT COMPLICATION, WITHOUT LONG-TERM CURRENT USE OF INSULIN (HCC): ICD-10-CM

## 2024-03-20 RX ORDER — SEMAGLUTIDE 0.68 MG/ML
INJECTION, SOLUTION SUBCUTANEOUS
Qty: 3 ML | Refills: 1 | Status: SHIPPED | OUTPATIENT
Start: 2024-03-20

## 2024-03-25 ENCOUNTER — OFFICE VISIT (OUTPATIENT)
Dept: PHYSICAL THERAPY | Facility: CLINIC | Age: 49
End: 2024-03-25
Attending: PHYSICIAN ASSISTANT
Payer: COMMERCIAL

## 2024-03-25 DIAGNOSIS — S46.211A BICEPS MUSCLE TEAR, RIGHT, INITIAL ENCOUNTER: Primary | ICD-10-CM

## 2024-03-25 DIAGNOSIS — M25.521 RIGHT ELBOW PAIN: ICD-10-CM

## 2024-03-25 PROCEDURE — 97140 MANUAL THERAPY 1/> REGIONS: CPT

## 2024-03-25 PROCEDURE — 97110 THERAPEUTIC EXERCISES: CPT

## 2024-03-25 NOTE — PROGRESS NOTES
"Daily Note     Today's date: 3/25/2024  Patient name: Gonzalo Mai  : 1975  MRN: [id number]  Referring provider: Kyrie Everett PA-C  Dx:   Encounter Diagnosis     ICD-10-CM    1. Biceps muscle tear, right, initial encounter  S46.211A       2. Right elbow pain  M25.521           Start Time: 181  Stop Time: 1900  Total time in clinic (min): 45 minutes    Subjective: Pt reports that the pain is feeling much better since he had to take a break from work.        Objective: See treatment diary below      Assessment: Tolerated treatment well. Began today with warm up on bike followed by manual interventions.  Added graston as well as epat today to decrease tightness.  Pt tolerated manual interventions well today.  Was able to move into stretching afterward with good tolerance. Added bicep strengthening activities including some stability components as well.   Patient would benefit from continued PT      Plan: Continue per plan of care.      Diagnosis: partial R bicep tear   Precautions:    Primary Goals: ROM, strength   *asterisks by exercise = given for HEP   Manuals 3/18 3/25 2/28 3/1 3/6   PROM        IASTM AK AK  AK    Eccentric loading        Gapping mobs   AK     Supine gapping        Proximal supination                There Ex        Bicep stretch doorway 10x10\" 10x10\" 10x10\" 10x10\" 10x10\"   Bicep stretch supine        Wrist flexor stretch 10x10\" 10x10\" 10x10\" 10x10\" 10x10\"   Wrist extensor stretch 10x10\" 10x10\" 10x10\" 10x10\" 10x10\"   Supination stretch 10x10\" 10x10\" 10x10\" 10x10\" 10x10\"   Pronation stretch 10x10\" 10x10\" 10x10\" 10x10\" 10x10\"                   UBE  2.5'/2.5' 2.5'/2.5' 2.5'/2.5' 2.5'/2.5'   Neuro Re-Ed        Wrist isometrics        Wrist extension   2x20 5# kettle bell 2x20 5# kettle bell 2x20 5# kettle bell   Wrist flexion   2x20 5# kettle bell 2x20 5# kettle bell 2x20 5# kettle bell   Radial deviation   2x20 5# kettle bell 2x20 5# kettle bell 2x20 5# kettle bell   Supination     2x20 5# " kettle bell 2x20 5# kettle bell   Pronation    2x20 5# kettle bell 2x20 5# kettle bell   Bicep curl        Hammer curl        TRX Pulls        Shoulder taps        Hammer curls c isometric        Walking unstable        Tricep extension      X10# 2x10 c scap squeeze   Skull crushers        Body blade    Ulnar/radial deviation 4x1'    Skull crushers  2x10 15# 3x10 10#     Hammer to bicep curl   3x10 10# 3x10 10# BFR    Bicep curl  2x10 15#      Shoulder press  2x10 15#, 1x10 10#               Re-evaluation         Ther Act         Education                 Modalities        EPAT AK 2 rounds 2.4 AK 2 rounds 2.4 AK 2 rounds 2.4  AK 2 rounds 2.4

## 2024-04-03 ENCOUNTER — OFFICE VISIT (OUTPATIENT)
Dept: PHYSICAL THERAPY | Facility: CLINIC | Age: 49
End: 2024-04-03
Attending: PHYSICIAN ASSISTANT
Payer: COMMERCIAL

## 2024-04-03 DIAGNOSIS — S46.211A BICEPS MUSCLE TEAR, RIGHT, INITIAL ENCOUNTER: Primary | ICD-10-CM

## 2024-04-03 DIAGNOSIS — M25.521 RIGHT ELBOW PAIN: ICD-10-CM

## 2024-04-03 PROCEDURE — 97112 NEUROMUSCULAR REEDUCATION: CPT | Performed by: PHYSICAL THERAPIST

## 2024-04-03 PROCEDURE — 97110 THERAPEUTIC EXERCISES: CPT

## 2024-04-03 PROCEDURE — 97140 MANUAL THERAPY 1/> REGIONS: CPT | Performed by: PHYSICAL THERAPIST

## 2024-04-03 NOTE — PROGRESS NOTES
"Daily Note     Today's date: 4/3/2024  Patient name: Gonzalo Mai  : 1975  MRN: [id number]  Referring provider: Kyrie Everett PA-C  Dx:   Encounter Diagnosis     ICD-10-CM    1. Biceps muscle tear, right, initial encounter  S46.211A       2. Right elbow pain  M25.521                      Subjective: Patient states he feels that his Biceps is improving, states he just feels weak with the Biceps at this time.      Objective: See treatment diary below      Assessment: Treatment today began with epat to pt's tolerance.  Continued to IASTM to the bicep with good tolearnce.  Added strengthening for bicep as well as tricep.  Pt was able to tolerate increased weight today during strenghtening activities.  Pt would continue to benefit from skilled physical therapy.        Plan: Continue per plan of care.      Diagnosis: partial R bicep tear   Precautions:    Primary Goals: ROM, strength   *asterisks by exercise = given for HEP   Manuals 3/18 3/25 4/3 3/1 3/6   PROM        IASTM AK AK KK AK    Eccentric loading        Gapping mobs        Supine gapping        Proximal supination                There Ex        Bicep stretch doorway 10x10\" 10x10\"  10x10\" 10x10\"   Bicep stretch supine        Wrist flexor stretch 10x10\" 10x10\" 10x10\" 10x10\" 10x10\"   Wrist extensor stretch 10x10\" 10x10\" 10x10\" 10x10\" 10x10\"   Supination stretch 10x10\" 10x10\" 10x10\" 10x10\" 10x10\"   Pronation stretch 10x10\" 10x10\" 10x10\" 10x10\" 10x10\"                   UBE  2.5'/2.5'  2.5'/2.5' 2.5'/2.5'   Neuro Re-Ed        Wrist isometrics        Wrist extension    2x20 5# kettle bell 2x20 5# kettle bell   Wrist flexion    2x20 5# kettle bell 2x20 5# kettle bell   Radial deviation    2x20 5# kettle bell 2x20 5# kettle bell   Supination     2x20 5# kettle bell 2x20 5# kettle bell   Pronation    2x20 5# kettle bell 2x20 5# kettle bell   Bicep curl        Hammer curl        TRX Pulls        Shoulder taps        Hammer curls c isometric        Walking unstable "        Tricep extension      X10# 2x10 c scap squeeze   Skull crushers        Body blade    Ulnar/radial deviation 4x1'    Skull crushers  2x10 15# 20# 3x10     Hammer to bicep curl   20# 3x10 3x10 10# BFR    Bicep curl  2x10 15# 20# 3x10     Shoulder press  2x10 15#, 1x10 10#               Re-evaluation         Ther Act         Education                 Modalities        EPAT AK 2 rounds 2.4 AK 2 rounds 2.4 KK 2 rounds 2.4  AK 2 rounds 2.4

## 2024-04-10 ENCOUNTER — APPOINTMENT (OUTPATIENT)
Dept: PHYSICAL THERAPY | Facility: CLINIC | Age: 49
End: 2024-04-10
Attending: PHYSICIAN ASSISTANT
Payer: COMMERCIAL

## 2024-04-17 ENCOUNTER — APPOINTMENT (OUTPATIENT)
Dept: PHYSICAL THERAPY | Facility: CLINIC | Age: 49
End: 2024-04-17
Attending: PHYSICIAN ASSISTANT
Payer: COMMERCIAL

## 2024-04-26 ENCOUNTER — OFFICE VISIT (OUTPATIENT)
Dept: URGENT CARE | Facility: CLINIC | Age: 49
End: 2024-04-26
Payer: COMMERCIAL

## 2024-04-26 VITALS
HEIGHT: 67 IN | RESPIRATION RATE: 12 BRPM | TEMPERATURE: 97 F | WEIGHT: 230 LBS | DIASTOLIC BLOOD PRESSURE: 86 MMHG | OXYGEN SATURATION: 99 % | SYSTOLIC BLOOD PRESSURE: 146 MMHG | BODY MASS INDEX: 36.1 KG/M2 | HEART RATE: 94 BPM

## 2024-04-26 DIAGNOSIS — K12.2 UVULITIS: Primary | ICD-10-CM

## 2024-04-26 LAB — S PYO AG THROAT QL: NEGATIVE

## 2024-04-26 PROCEDURE — 87070 CULTURE OTHR SPECIMN AEROBIC: CPT | Performed by: PHYSICIAN ASSISTANT

## 2024-04-26 PROCEDURE — 99213 OFFICE O/P EST LOW 20 MIN: CPT | Performed by: PHYSICIAN ASSISTANT

## 2024-04-26 PROCEDURE — 87880 STREP A ASSAY W/OPTIC: CPT | Performed by: PHYSICIAN ASSISTANT

## 2024-04-26 RX ORDER — PREDNISONE 10 MG/1
TABLET ORAL
Qty: 12 TABLET | Refills: 0 | Status: SHIPPED | OUTPATIENT
Start: 2024-04-26

## 2024-04-26 RX ORDER — AZITHROMYCIN 250 MG/1
TABLET, FILM COATED ORAL
Qty: 6 TABLET | Refills: 0 | Status: SHIPPED | OUTPATIENT
Start: 2024-04-26 | End: 2024-04-30

## 2024-04-26 NOTE — PROGRESS NOTES
"[facility]        NAME: Gonzalo Mai is a 49 y.o. male  : 1975    MRN: [id number]  DATE: 2024  TIME: 11:51 AM    /86   Pulse 94   Temp (!) 97 °F (36.1 °C)   Resp 12   Ht 5' 6.5\" (1.689 m)   Wt 104 kg (230 lb)   SpO2 99%   BMI 36.57 kg/m²     Assessment and Plan   Uvulitis [K12.2]  1. Uvulitis  POCT rapid ANTIGEN strepA    Throat culture    azithromycin (ZITHROMAX) 250 mg tablet    predniSONE 10 mg tablet            Patient Instructions       Follow up with PCP in 3-5 days.  Proceed to  ER if symptoms worsen.    Chief Complaint     Chief Complaint   Patient presents with    Sore Throat     Sore throat x 3 days          History of Present Illness       Pt with sore throat and uvula swelling for several days    Sore Throat         Review of Systems   Review of Systems   Constitutional: Negative.    HENT:  Positive for sore throat.    Eyes: Negative.    Respiratory: Negative.     Cardiovascular: Negative.    Gastrointestinal: Negative.    Endocrine: Negative.    Genitourinary: Negative.    Musculoskeletal: Negative.    Skin: Negative.    Allergic/Immunologic: Negative.    Neurological: Negative.    Hematological: Negative.    Psychiatric/Behavioral: Negative.     All other systems reviewed and are negative.        Current Medications       Current Outpatient Medications:     azithromycin (ZITHROMAX) 250 mg tablet, Take 2 tablets today then 1 tablet daily x 4 days, Disp: 6 tablet, Rfl: 0    predniSONE 10 mg tablet, 3 tabs po qd x 2 days then 2 tabs po qd x 2 days then 1 tab po qd x 2 days, Disp: 12 tablet, Rfl: 0    acetaminophen (TYLENOL) 650 mg CR tablet, Take 1 tablet (650 mg total) by mouth every 8 (eight) hours as needed for mild pain (Patient not taking: Reported on 2023), Disp: 30 tablet, Rfl: 0    albuterol (PROVENTIL HFA,VENTOLIN HFA) 90 mcg/act inhaler, Inhale 2 puffs every 6 (six) hours as needed for wheezing or shortness of breath (Patient not taking: Reported on " 3/10/2022), Disp: 1 Inhaler, Rfl: 0    ALPRAZolam (XANAX) 0.25 mg tablet, Take 1 tablet (0.25 mg total) by mouth daily at bedtime as needed for anxiety (Patient not taking: Reported on 7/1/2022), Disp: 10 tablet, Rfl: 0    budesonide-formoterol (SYMBICORT) 80-4.5 MCG/ACT inhaler, Inhale 2 puffs 2 (two) times a day Rinse mouth after use. (Patient not taking: Reported on 3/10/2022), Disp: 1 Inhaler, Rfl: 2    Cholecalciferol (VITAMIN D) 2000 units CAPS, Take 1 capsule by mouth daily, Disp: , Rfl:     docusate sodium (COLACE) 100 mg capsule, Take 1 capsule (100 mg total) by mouth 2 (two) times a day (Patient not taking: Reported on 3/10/2022), Disp: 20 capsule, Rfl: 0    Hydrocortisone Butyrate 0.1 % CREA, as needed (Patient not taking: Reported on 12/2/2022), Disp: , Rfl: 3    losartan (COZAAR) 50 mg tablet, TAKE 1 TABLET BY MOUTH EVERY DAY, Disp: 90 tablet, Rfl: 1    metFORMIN (GLUCOPHAGE) 500 mg tablet, TAKE 1 TABLET BY MOUTH EVERY DAY WITH BREAKFAST, Disp: 90 tablet, Rfl: 0    methocarbamol (ROBAXIN) 750 mg tablet, Take 1 tablet (750 mg total) by mouth every 6 (six) hours as needed for muscle spasms (neck pain), Disp: 30 tablet, Rfl: 3    methylPREDNISolone 4 MG tablet therapy pack, Use as directed on package (Patient not taking: Reported on 3/10/2022), Disp: 21 each, Rfl: 0    methylPREDNISolone 4 MG tablet therapy pack, Use as directed on package (Patient not taking: Reported on 6/29/2023), Disp: 1 each, Rfl: 0    Multiple Vitamins-Minerals (multivitamin with minerals) tablet, Take 1 tablet by mouth daily, Disp: , Rfl:     pregabalin (LYRICA) 150 mg capsule, Take 1 tablet in the morning and 1 tablet in the evening, Disp: 60 capsule, Rfl: 5    rosuvastatin (CRESTOR) 5 mg tablet, TAKE 1 TABLET (5 MG TOTAL) BY MOUTH DAILY., Disp: 90 tablet, Rfl: 1    semaglutide, 0.25 or 0.5 mg/dose, (Ozempic, 0.25 or 0.5 MG/DOSE,) 2 mg/3 mL injection pen, INJECT 0.375 ML (0.25 MG TOTAL) UNDER THE SKIN EVERY 7 DAYS FOR 4 DOSES (28  DAYS), THEN 0.5 MG UNDER THE SKIN EVERY 7 DAYS, Disp: 3 mL, Rfl: 1    tadalafil (CIALIS) 20 MG tablet, Take 1 tablet (20 mg total) by mouth daily as needed for erectile dysfunction, Disp: 30 tablet, Rfl: 4    Ustekinumab (STELARA SC), Inject under the skin every 3 (three) months (Patient not taking: Reported on 6/28/2023), Disp: , Rfl:     Current Allergies     Allergies as of 04/26/2024    (No Known Allergies)            The following portions of the patient's history were reviewed and updated as appropriate: allergies, current medications, past family history, past medical history, past social history, past surgical history and problem list.     Past Medical History:   Diagnosis Date    Diverticulitis     Hyperlipemia     Hypertension     Prediabetes     Psoriasis        Past Surgical History:   Procedure Laterality Date    ABDOMINAL SURGERY      COLONOSCOPY      NM ARTHRD ANT INTERBODY DECOMPRESS CERVICAL BELW C2 Bilateral 1/26/2022    Procedure: C3/4 Anterior Cervical Discectomy and Fusion (neuromonitoring);  Surgeon: Agapito Mathur MD;  [facility];  Service: Neurosurgery    NM COLONOSCOPY FLX DX W/COLLJ SPEC WHEN PFRMD N/A 4/3/2019    Procedure: COLONOSCOPY;  Surgeon: Ramin Donovan MD;  [facility]: [facility];  Service: Colorectal    NM LAPAROSCOPY COLECTOMY PARTIAL W/ANASTOMOSIS N/A 1/8/2020    Procedure: RESECTION COLON SIGMOID LAPAROSCOPIC, LAPAROSCOPIC SPLENIC FLEXURE MOBILIZATION;  Surgeon: Ramin Donovan MD;  [facility];  Service: Colorectal    NM SIGMOIDOSCOPY FLX DX W/COLLJ SPEC BR/WA IF PFRMD N/A 1/8/2020    Procedure: SIGMOIDOSCOPY FLEXIBLE;  Surgeon: Ramin Donovan MD;  [facility];  Service: Colorectal    TENDON REPAIR      right elbow       Family History   Problem Relation Age of Onset    Diabetes Maternal Grandmother     Diabetes Paternal Grandfather     Cancer Father     Colon cancer Neg Hx          Medications have been verified.        Objective  "  /86   Pulse 94   Temp (!) 97 °F (36.1 °C)   Resp 12   Ht 5' 6.5\" (1.689 m)   Wt 104 kg (230 lb)   SpO2 99%   BMI 36.57 kg/m²        Physical Exam     Physical Exam  Vitals and nursing note reviewed.   Constitutional:       Appearance: He is well-developed and normal weight.   HENT:      Head: Normocephalic and atraumatic.      Right Ear: Tympanic membrane and ear canal normal.      Left Ear: Tympanic membrane and ear canal normal.      Mouth/Throat:      Pharynx: Uvula midline. Posterior oropharyngeal erythema and uvula swelling present. No pharyngeal swelling or oropharyngeal exudate.      Tonsils: No tonsillar exudate.   Eyes:      Conjunctiva/sclera: Conjunctivae normal.      Pupils: Pupils are equal, round, and reactive to light.   Cardiovascular:      Rate and Rhythm: Normal rate and regular rhythm.      Heart sounds: Normal heart sounds.   Pulmonary:      Effort: Pulmonary effort is normal.      Breath sounds: Normal breath sounds.   Abdominal:      General: Bowel sounds are normal.      Palpations: Abdomen is soft.   Musculoskeletal:      Cervical back: Normal range of motion and neck supple.   Lymphadenopathy:      Cervical: Cervical adenopathy present.   Skin:     General: Skin is warm.   Neurological:      Mental Status: He is alert and oriented to person, place, and time.                     "

## 2024-04-28 LAB — BACTERIA THROAT CULT: NORMAL

## 2024-05-06 ENCOUNTER — OFFICE VISIT (OUTPATIENT)
Dept: FAMILY MEDICINE CLINIC | Facility: CLINIC | Age: 49
End: 2024-05-06
Payer: COMMERCIAL

## 2024-05-06 ENCOUNTER — TELEPHONE (OUTPATIENT)
Dept: FAMILY MEDICINE CLINIC | Facility: CLINIC | Age: 49
End: 2024-05-06

## 2024-05-06 VITALS
HEART RATE: 83 BPM | DIASTOLIC BLOOD PRESSURE: 78 MMHG | HEIGHT: 67 IN | BODY MASS INDEX: 36.73 KG/M2 | OXYGEN SATURATION: 97 % | WEIGHT: 234 LBS | SYSTOLIC BLOOD PRESSURE: 130 MMHG | RESPIRATION RATE: 16 BRPM

## 2024-05-06 DIAGNOSIS — E78.5 HYPERLIPIDEMIA, UNSPECIFIED HYPERLIPIDEMIA TYPE: ICD-10-CM

## 2024-05-06 DIAGNOSIS — J45.40 MODERATE PERSISTENT ASTHMA WITHOUT COMPLICATION: ICD-10-CM

## 2024-05-06 DIAGNOSIS — Z00.00 ANNUAL PHYSICAL EXAM: ICD-10-CM

## 2024-05-06 DIAGNOSIS — N52.9 ERECTILE DYSFUNCTION, UNSPECIFIED ERECTILE DYSFUNCTION TYPE: ICD-10-CM

## 2024-05-06 DIAGNOSIS — G95.20 CERVICAL CORD COMPRESSION WITH MYELOPATHY (HCC): ICD-10-CM

## 2024-05-06 DIAGNOSIS — I10 ESSENTIAL HYPERTENSION: ICD-10-CM

## 2024-05-06 DIAGNOSIS — Z00.00 PREVENTATIVE HEALTH CARE: ICD-10-CM

## 2024-05-06 DIAGNOSIS — M79.674 PAIN OF RIGHT GREAT TOE: ICD-10-CM

## 2024-05-06 DIAGNOSIS — E11.9 CONTROLLED TYPE 2 DIABETES MELLITUS WITHOUT COMPLICATION, WITHOUT LONG-TERM CURRENT USE OF INSULIN (HCC): Primary | ICD-10-CM

## 2024-05-06 DIAGNOSIS — Z12.5 SCREENING FOR PROSTATE CANCER: ICD-10-CM

## 2024-05-06 PROBLEM — F41.9 ANXIETY: Status: RESOLVED | Noted: 2022-03-25 | Resolved: 2024-05-06

## 2024-05-06 LAB
LEFT EYE DIABETIC RETINOPATHY: NORMAL
LEFT EYE IMAGE QUALITY: NORMAL
LEFT EYE MACULAR EDEMA: NORMAL
LEFT EYE OTHER RETINOPATHY: NORMAL
RIGHT EYE DIABETIC RETINOPATHY: NORMAL
RIGHT EYE IMAGE QUALITY: NORMAL
RIGHT EYE MACULAR EDEMA: NORMAL
RIGHT EYE OTHER RETINOPATHY: NORMAL
SEVERITY (EYE EXAM): NORMAL

## 2024-05-06 PROCEDURE — 99396 PREV VISIT EST AGE 40-64: CPT | Performed by: FAMILY MEDICINE

## 2024-05-06 PROCEDURE — 99214 OFFICE O/P EST MOD 30 MIN: CPT | Performed by: FAMILY MEDICINE

## 2024-05-06 RX ORDER — ROSUVASTATIN CALCIUM 5 MG/1
5 TABLET, COATED ORAL DAILY
Qty: 90 TABLET | Refills: 1 | Status: SHIPPED | OUTPATIENT
Start: 2024-05-06

## 2024-05-06 RX ORDER — LOSARTAN POTASSIUM 50 MG/1
50 TABLET ORAL DAILY
Qty: 90 TABLET | Refills: 1 | Status: SHIPPED | OUTPATIENT
Start: 2024-05-06

## 2024-05-06 NOTE — ASSESSMENT & PLAN NOTE
Symptoms have improved significantly since patient quit smoking.  Patient is not using Symbicort or albuterol.  Denies any symptoms of productive cough or shortness of breath.  Patient still has inhalers at home, will call the office if he needs refills..  Recommended follow-up if symptoms change.
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
## 2024-05-06 NOTE — ASSESSMENT & PLAN NOTE
Lab Results   Component Value Date    HGBA1C 6.8 (H) 01/22/2024     Patient is currently on metformin 500 once daily.  Was unable to tolerate Ozempic due to severe GI side effects.  Requesting to be started on Mounjaro.  Medication sent to pharmacy.  Patient advised to get metabolic labs 4 weeks after he starts Mounjaro.  And then next set will be at 4 months interval.
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
## 2024-05-06 NOTE — TELEPHONE ENCOUNTER
----- Message from Malia Trejo MD sent at 5/6/2024  1:52 PM EDT -----  Please call patient with normal IRIS results.

## 2024-05-06 NOTE — ASSESSMENT & PLAN NOTE
On Cialis 20 milligram.  Tolerating without any side effects.  Continue same.   Requesting a refill.

## 2024-06-11 ENCOUNTER — TELEPHONE (OUTPATIENT)
Age: 49
End: 2024-06-11

## 2024-06-11 NOTE — TELEPHONE ENCOUNTER
PA for Mounjaro    Submitted via    [x]CMM-KEY [id number]  []Surescripts-Case ID #   []Faxed to plan   []Other website   []Phone call Case ID #     Office notes sent, clinical questions answered. Awaiting determination    Turnaround time for your insurance to make a decision on your Prior Authorization can take 7-21 business days.

## 2024-06-12 DIAGNOSIS — E11.9 CONTROLLED TYPE 2 DIABETES MELLITUS WITHOUT COMPLICATION, WITHOUT LONG-TERM CURRENT USE OF INSULIN (HCC): Primary | ICD-10-CM

## 2024-06-12 NOTE — TELEPHONE ENCOUNTER
PA for Mounjaro Denied    Reason:      Message sent to office clinical pool Yes    Denial letter scanned into Media Yes    Appeal started No     **Please follow up with your patient regarding denial and next steps**

## 2024-07-17 ENCOUNTER — APPOINTMENT (OUTPATIENT)
Dept: RADIOLOGY | Facility: CLINIC | Age: 49
End: 2024-07-17
Payer: COMMERCIAL

## 2024-07-17 ENCOUNTER — TRANSCRIBE ORDERS (OUTPATIENT)
Dept: LAB | Facility: CLINIC | Age: 49
End: 2024-07-17

## 2024-07-17 ENCOUNTER — APPOINTMENT (OUTPATIENT)
Dept: LAB | Facility: CLINIC | Age: 49
End: 2024-07-17
Payer: COMMERCIAL

## 2024-07-17 DIAGNOSIS — Z79.899 ENCOUNTER FOR LONG-TERM (CURRENT) USE OF OTHER MEDICATIONS: Primary | ICD-10-CM

## 2024-07-17 DIAGNOSIS — E78.5 HYPERLIPIDEMIA, UNSPECIFIED HYPERLIPIDEMIA TYPE: ICD-10-CM

## 2024-07-17 DIAGNOSIS — Z79.899 ENCOUNTER FOR LONG-TERM (CURRENT) USE OF OTHER MEDICATIONS: ICD-10-CM

## 2024-07-17 DIAGNOSIS — Z12.5 SCREENING FOR PROSTATE CANCER: ICD-10-CM

## 2024-07-17 DIAGNOSIS — L40.0 PSORIASIS VULGARIS: ICD-10-CM

## 2024-07-17 DIAGNOSIS — E11.9 CONTROLLED TYPE 2 DIABETES MELLITUS WITHOUT COMPLICATION, WITHOUT LONG-TERM CURRENT USE OF INSULIN (HCC): ICD-10-CM

## 2024-07-17 DIAGNOSIS — M79.674 PAIN OF RIGHT GREAT TOE: ICD-10-CM

## 2024-07-17 LAB
ALBUMIN SERPL BCG-MCNC: 4.2 G/DL (ref 3.5–5)
ALP SERPL-CCNC: 85 U/L (ref 34–104)
ALT SERPL W P-5'-P-CCNC: 56 U/L (ref 7–52)
ANION GAP SERPL CALCULATED.3IONS-SCNC: 10 MMOL/L (ref 4–13)
AST SERPL W P-5'-P-CCNC: 33 U/L (ref 13–39)
BASOPHILS # BLD AUTO: 0.09 THOUSANDS/ÂΜL (ref 0–0.1)
BASOPHILS NFR BLD AUTO: 2 % (ref 0–1)
BILIRUB SERPL-MCNC: 0.37 MG/DL (ref 0.2–1)
BUN SERPL-MCNC: 15 MG/DL (ref 5–25)
CALCIUM SERPL-MCNC: 9.2 MG/DL (ref 8.4–10.2)
CHLORIDE SERPL-SCNC: 102 MMOL/L (ref 96–108)
CHOLEST SERPL-MCNC: 153 MG/DL
CO2 SERPL-SCNC: 26 MMOL/L (ref 21–32)
CREAT SERPL-MCNC: 0.78 MG/DL (ref 0.6–1.3)
EOSINOPHIL # BLD AUTO: 0.14 THOUSAND/ÂΜL (ref 0–0.61)
EOSINOPHIL NFR BLD AUTO: 2 % (ref 0–6)
ERYTHROCYTE [DISTWIDTH] IN BLOOD BY AUTOMATED COUNT: 13.4 % (ref 11.6–15.1)
EST. AVERAGE GLUCOSE BLD GHB EST-MCNC: 140 MG/DL
GFR SERPL CREATININE-BSD FRML MDRD: 107 ML/MIN/1.73SQ M
GLUCOSE P FAST SERPL-MCNC: 115 MG/DL (ref 65–99)
HBA1C MFR BLD: 6.5 %
HCT VFR BLD AUTO: 43.2 % (ref 36.5–49.3)
HDLC SERPL-MCNC: 43 MG/DL
HGB BLD-MCNC: 14.5 G/DL (ref 12–17)
IMM GRANULOCYTES # BLD AUTO: 0.03 THOUSAND/UL (ref 0–0.2)
IMM GRANULOCYTES NFR BLD AUTO: 1 % (ref 0–2)
LDLC SERPL CALC-MCNC: 61 MG/DL (ref 0–100)
LYMPHOCYTES # BLD AUTO: 2.35 THOUSANDS/ÂΜL (ref 0.6–4.47)
LYMPHOCYTES NFR BLD AUTO: 39 % (ref 14–44)
MCH RBC QN AUTO: 29 PG (ref 26.8–34.3)
MCHC RBC AUTO-ENTMCNC: 33.6 G/DL (ref 31.4–37.4)
MCV RBC AUTO: 86 FL (ref 82–98)
MONOCYTES # BLD AUTO: 0.38 THOUSAND/ÂΜL (ref 0.17–1.22)
MONOCYTES NFR BLD AUTO: 6 % (ref 4–12)
NEUTROPHILS # BLD AUTO: 3.07 THOUSANDS/ÂΜL (ref 1.85–7.62)
NEUTS SEG NFR BLD AUTO: 50 % (ref 43–75)
NRBC BLD AUTO-RTO: 0 /100 WBCS
PLATELET # BLD AUTO: 254 THOUSANDS/UL (ref 149–390)
PMV BLD AUTO: 9.8 FL (ref 8.9–12.7)
POTASSIUM SERPL-SCNC: 4 MMOL/L (ref 3.5–5.3)
PROT SERPL-MCNC: 7.2 G/DL (ref 6.4–8.4)
PSA SERPL-MCNC: 0.77 NG/ML (ref 0–4)
RBC # BLD AUTO: 5 MILLION/UL (ref 3.88–5.62)
SODIUM SERPL-SCNC: 138 MMOL/L (ref 135–147)
TRIGL SERPL-MCNC: 247 MG/DL
URATE SERPL-MCNC: 5.1 MG/DL (ref 3.5–8.5)
WBC # BLD AUTO: 6.06 THOUSAND/UL (ref 4.31–10.16)

## 2024-07-17 PROCEDURE — 80053 COMPREHEN METABOLIC PANEL: CPT

## 2024-07-17 PROCEDURE — 36415 COLL VENOUS BLD VENIPUNCTURE: CPT

## 2024-07-17 PROCEDURE — 80061 LIPID PANEL: CPT

## 2024-07-17 PROCEDURE — G0103 PSA SCREENING: HCPCS

## 2024-07-17 PROCEDURE — 85025 COMPLETE CBC W/AUTO DIFF WBC: CPT

## 2024-07-17 PROCEDURE — 83036 HEMOGLOBIN GLYCOSYLATED A1C: CPT

## 2024-07-17 PROCEDURE — 86480 TB TEST CELL IMMUN MEASURE: CPT

## 2024-07-17 PROCEDURE — 84550 ASSAY OF BLOOD/URIC ACID: CPT

## 2024-07-18 ENCOUNTER — APPOINTMENT (OUTPATIENT)
Dept: RADIOLOGY | Facility: MEDICAL CENTER | Age: 49
End: 2024-07-18
Payer: COMMERCIAL
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
## 2024-07-18 LAB
GAMMA INTERFERON BACKGROUND BLD IA-ACNC: 0.03 IU/ML
M TB IFN-G BLD-IMP: NEGATIVE
M TB IFN-G CD4+ BCKGRND COR BLD-ACNC: 0.04 IU/ML
M TB IFN-G CD4+ BCKGRND COR BLD-ACNC: 0.05 IU/ML
MITOGEN IGNF BCKGRD COR BLD-ACNC: 9.97 IU/ML
TESTOST FREE SERPL-MCNC: 10.7 PG/ML (ref 6.8–21.5)
TESTOST SERPL-MCNC: 212 NG/DL (ref 264–916)

## 2024-07-18 PROCEDURE — 73630 X-RAY EXAM OF FOOT: CPT

## 2024-07-19 ENCOUNTER — APPOINTMENT (OUTPATIENT)
Dept: RADIOLOGY | Facility: MEDICAL CENTER | Age: 49
End: 2024-07-19
Payer: COMMERCIAL
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
## 2024-07-19 PROCEDURE — 73630 X-RAY EXAM OF FOOT: CPT

## 2024-07-30 NOTE — ASSESSMENT & PLAN NOTE
Blood pressure is at goal.  Continue losartan 50 milligram.   Quality 130: Documentation Of Current Medications In The Medical Record: Current Medications Documented Quality 431: Preventive Care And Screening: Unhealthy Alcohol Use - Screening: Patient did not receive brief counseling if identified as an unhealthy alcohol user Quality 226: Preventive Care And Screening: Tobacco Use: Screening And Cessation Intervention: Tobacco Screening not Performed Detail Level: Zone

## 2024-08-03 DIAGNOSIS — E34.9 TESTOSTERONE DEFICIENCY: Primary | ICD-10-CM

## 2024-08-05 ENCOUNTER — TELEPHONE (OUTPATIENT)
Dept: FAMILY MEDICINE CLINIC | Facility: CLINIC | Age: 49
End: 2024-08-05

## 2024-08-05 NOTE — TELEPHONE ENCOUNTER
----- Message from Malia Trejo MD sent at 8/3/2024 10:34 AM EDT -----  Please call the patient regarding his abnormal result.Testosterone level is low. I would recommend urology evaluation for further management. Order placed.

## 2024-08-16 ENCOUNTER — TELEPHONE (OUTPATIENT)
Age: 49
End: 2024-08-16

## 2024-08-16 DIAGNOSIS — M54.10 RADICULOPATHY, UNSPECIFIED SPINAL REGION: ICD-10-CM

## 2024-08-16 RX ORDER — PREGABALIN 150 MG/1
CAPSULE ORAL
Qty: 60 CAPSULE | Refills: 5 | Status: SHIPPED | OUTPATIENT
Start: 2024-08-16

## 2024-08-16 NOTE — TELEPHONE ENCOUNTER
Left vmom for pt to c/b. C/B # and OH provided.     *Pregabalin 150 mg twice a day was LP 11/21/23 w/ 5 RF. LOVS was 6/26/23, has upcoming ovs on 9/10 w/ [place].  *Is pt taking once a day or twice a day?

## 2024-08-16 NOTE — TELEPHONE ENCOUNTER
Pt contacted Call Center requested refill of their medication.      PT STATED HE STOPPED TAKING THE MEDICATION FOR A FEW WEEKS AND HIS PAIN HAS RETURNED      Doctor Name: Camila      Medication Name: pregabalin      Dosage of Med: 150 mg      Frequency of Med: 1 per day      Remaining Medication: 0      Pharmacy and Location: [facility]/pharmacy #[id number]  [place] [address] [phone]         Pt. Preferred Callback Phone Number: [phone]       Thank you.       PLEASE ADVISE PATIENTS:    REFILL REQUESTS WILL BE PROCESSED WITHIN 24-48 HOURS.

## 2024-08-16 NOTE — TELEPHONE ENCOUNTER
Gonzalo called the medication refill line returning a call from Arelis Gomez RN regarding the refill of Pregabalin 150 mg capsule    Gonzalo advised he was taking 1 capsule twice a day and he has not had any in the last few weeks, and pain has returned    Gonzalo is asking if medication can be sent  to   [facility]/pharmacy #[id number] - [place] - [address] [place] [address].     If any additional questions please call him at [phone]

## 2024-08-27 DIAGNOSIS — E11.9 CONTROLLED TYPE 2 DIABETES MELLITUS WITHOUT COMPLICATION, WITHOUT LONG-TERM CURRENT USE OF INSULIN (HCC): ICD-10-CM

## 2024-09-09 ENCOUNTER — OFFICE VISIT (OUTPATIENT)
Dept: FAMILY MEDICINE CLINIC | Facility: CLINIC | Age: 49
End: 2024-09-09
Payer: COMMERCIAL

## 2024-09-09 VITALS
BODY MASS INDEX: 38.45 KG/M2 | DIASTOLIC BLOOD PRESSURE: 80 MMHG | OXYGEN SATURATION: 98 % | HEART RATE: 79 BPM | WEIGHT: 245 LBS | SYSTOLIC BLOOD PRESSURE: 132 MMHG | HEIGHT: 67 IN

## 2024-09-09 DIAGNOSIS — I10 ESSENTIAL HYPERTENSION: ICD-10-CM

## 2024-09-09 DIAGNOSIS — E11.9 CONTROLLED TYPE 2 DIABETES MELLITUS WITHOUT COMPLICATION, WITHOUT LONG-TERM CURRENT USE OF INSULIN (HCC): Primary | ICD-10-CM

## 2024-09-09 DIAGNOSIS — N52.9 ERECTILE DYSFUNCTION, UNSPECIFIED ERECTILE DYSFUNCTION TYPE: ICD-10-CM

## 2024-09-09 DIAGNOSIS — E78.5 HYPERLIPIDEMIA, UNSPECIFIED HYPERLIPIDEMIA TYPE: ICD-10-CM

## 2024-09-09 PROCEDURE — 99214 OFFICE O/P EST MOD 30 MIN: CPT | Performed by: FAMILY MEDICINE

## 2024-09-09 RX ORDER — ROSUVASTATIN CALCIUM 5 MG/1
5 TABLET, COATED ORAL DAILY
Qty: 90 TABLET | Refills: 1 | Status: SHIPPED | OUTPATIENT
Start: 2024-09-09

## 2024-09-09 RX ORDER — TADALAFIL 20 MG/1
20 TABLET ORAL DAILY PRN
Qty: 30 TABLET | Refills: 4 | Status: SHIPPED | OUTPATIENT
Start: 2024-09-09

## 2024-09-09 RX ORDER — LOSARTAN POTASSIUM 50 MG/1
50 TABLET ORAL DAILY
Qty: 90 TABLET | Refills: 1 | Status: SHIPPED | OUTPATIENT
Start: 2024-09-09

## 2024-09-09 NOTE — PROGRESS NOTES
Subjective:      Patient ID: Gonazlo Mai is a 49 y.o. male.    HPI      Patient is here for routine 6-month follow-up.  Has history of type 2 diabetes, hypertension, dyslipidemia, erectile dysfunction.  Metabolic labs reviewed with patient today.  Noted to have improved glycemic control with A1c 6.5.  LDL is also at goal.  Triglycerides are borderline high.  Patient is trying to eat healthy and exercise but unable to do so due to foot pain.  I have recommended him to start swimming to improve his cardiovascular health.  Patient is on metformin 500 mg daily.  Patient has past history of diverticulitis s/p colectomy, unable to tolerate SGLT2 inhibitors due to GI side effects so he discontinued.  Patient is concerned about his weight.  Currently not monitoring his calorie intake.  I have advised him to consider low calorie diet, restricting intake of carbohydrates, hopefully that should help with weight loss.    Past Medical History:   Diagnosis Date    Diverticulitis     Hyperlipemia     Hypertension     Prediabetes     Psoriasis        Family History   Problem Relation Age of Onset    Diabetes Maternal Grandmother     Diabetes Paternal Grandfather     Cancer Father     Colon cancer Neg Hx        Past Surgical History:   Procedure Laterality Date    ABDOMINAL SURGERY      COLONOSCOPY      NM ARTHRD ANT INTERBODY DECOMPRESS CERVICAL BELW C2 Bilateral 1/26/2022    Procedure: C3/4 Anterior Cervical Discectomy and Fusion (neuromonitoring);  Surgeon: Agapito Mathur MD;  [facility];  Service: Neurosurgery    NM COLONOSCOPY FLX DX W/COLLJ SPEC WHEN PFRMD N/A 4/3/2019    Procedure: COLONOSCOPY;  Surgeon: Ramin Donovan MD;  [facility];  Service: Colorectal    NM LAPAROSCOPY COLECTOMY PARTIAL W/ANASTOMOSIS N/A 1/8/2020    Procedure: RESECTION COLON SIGMOID LAPAROSCOPIC, LAPAROSCOPIC SPLENIC FLEXURE MOBILIZATION;  Surgeon: Ramin Donovan MD;  [facility];  Service: Colorectal    NM  SIGMOIDOSCOPY FLX DX W/COLLJ SPEC BR/WA IF PFRMD N/A 1/8/2020    Procedure: SIGMOIDOSCOPY FLEXIBLE;  Surgeon: Ramin Donovan MD;  [facility];  Service: Colorectal    TENDON REPAIR      right elbow        reports that he quit smoking about 5 years ago. His smoking use included cigarettes. He has a 60 pack-year smoking history. His smokeless tobacco use includes chew. He reports current alcohol use. He reports current drug use. Drug: Marijuana.      Current Outpatient Medications:     Cholecalciferol (VITAMIN D) 2000 units CAPS, Take 1 capsule by mouth daily, Disp: , Rfl:     losartan (COZAAR) 50 mg tablet, Take 1 tablet (50 mg total) by mouth daily, Disp: 90 tablet, Rfl: 1    metFORMIN (GLUCOPHAGE) 500 mg tablet, TAKE 1 TABLET BY MOUTH EVERY DAY WITH BREAKFAST, Disp: 90 tablet, Rfl: 1    methocarbamol (ROBAXIN) 750 mg tablet, Take 1 tablet (750 mg total) by mouth every 6 (six) hours as needed for muscle spasms (neck pain), Disp: 30 tablet, Rfl: 3    Multiple Vitamins-Minerals (multivitamin with minerals) tablet, Take 1 tablet by mouth daily, Disp: , Rfl:     pregabalin (LYRICA) 150 mg capsule, Take 1 tablet in the morning and 1 tablet in the evening, Disp: 60 capsule, Rfl: 5    rosuvastatin (CRESTOR) 5 mg tablet, Take 1 tablet (5 mg total) by mouth daily, Disp: 90 tablet, Rfl: 1    tadalafil (CIALIS) 20 MG tablet, Take 1 tablet (20 mg total) by mouth daily as needed for erectile dysfunction, Disp: 30 tablet, Rfl: 4    ALPRAZolam (XANAX) 0.25 mg tablet, Take 1 tablet (0.25 mg total) by mouth daily at bedtime as needed for anxiety (Patient not taking: Reported on 7/1/2022), Disp: 10 tablet, Rfl: 0    Ustekinumab (STELARA SC), Inject under the skin every 3 (three) months (Patient not taking: Reported on 6/28/2023), Disp: , Rfl:     The following portions of the patient's history were reviewed and updated as appropriate: allergies, current medications, past family history, past medical history, past  "social history, past surgical history and problem list.    Review of Systems   Respiratory: Negative.     Cardiovascular: Negative.            Objective:    /80   Pulse 79   Ht 5' 6.5\" (1.689 m)   Wt 111 kg (245 lb)   SpO2 98%   BMI 38.95 kg/m²      Physical Exam  Cardiovascular:      Pulses: no weak pulses.           Dorsalis pedis pulses are 1+ on the right side and 1+ on the left side.        Posterior tibial pulses are 1+ on the right side and 1+ on the left side.      Heart sounds: Normal heart sounds.   Pulmonary:      Breath sounds: Normal breath sounds.   Feet:      Right foot:      Skin integrity: No ulcer, skin breakdown, erythema, warmth, callus or dry skin.      Left foot:      Skin integrity: No ulcer, skin breakdown, erythema, warmth, callus or dry skin.         Patient's shoes and socks removed.    Right Foot/Ankle   Right Foot Inspection  Skin Exam: skin normal and skin intact. No dry skin, no warmth, no callus, no erythema, no maceration, no abnormal color, no pre-ulcer, no ulcer and no callus.     Toe Exam: ROM and strength within normal limits.     Sensory   Vibration: intact  Proprioception: intact  Monofilament testing: intact    Vascular  Capillary refills: < 3 seconds  The right DP pulse is 1+. The right PT pulse is 1+.     Left Foot/Ankle  Left Foot Inspection  Skin Exam: skin normal and skin intact. No dry skin, no warmth, no erythema, no maceration, normal color, no pre-ulcer, no ulcer and no callus.     Toe Exam: ROM and strength within normal limits.     Sensory   Vibration: intact  Proprioception: intact  Monofilament testing: intact    Vascular  Capillary refills: < 3 seconds  The left DP pulse is 1+. The left PT pulse is 1+.     Assign Risk Category  No deformity present  No loss of protective sensation  No weak pulses  Risk: 0      No results found for this or any previous visit (from the past 1008 hour(s)).    Assessment/Plan:    No problem-specific Assessment & Plan notes " found for this encounter.           Problem List Items Addressed This Visit          Cardiovascular and Mediastinum    Essential hypertension     Blood pressure is at goal.  Continue losartan 50 milligram.         Relevant Medications    losartan (COZAAR) 50 mg tablet       Endocrine    Controlled type 2 diabetes mellitus without complication, without long-term current use of insulin ([facility]) - Primary       Lab Results   Component Value Date    HGBA1C 6.5 (H) 07/17/2024     Glycemic control has improved.  Patient is currently on metformin 500 once daily.    Patient unable to tolerate SGLT2 inhibitor due to GI side effects.  Has past history of diverticulitis.  I have advised him to start with a low calorie diet and improve physical activity.  Continue monitoring with metabolic labs at 4 months interval.         Relevant Orders    Diabetic foot exam    Comprehensive metabolic panel    Hemoglobin A1C       Other    Hyperlipidemia     Cholesterol panel improved significantly.  Continue Crestor 5 milligram         Relevant Medications    rosuvastatin (CRESTOR) 5 mg tablet    Other Relevant Orders    Lipid Panel with Direct LDL reflex    Erectile dysfunction     On Cialis 20 milligram.  Tolerating without any side effects.  Continue same.   Requesting a refill.          Relevant Medications    tadalafil (CIALIS) 20 MG tablet

## 2024-09-09 NOTE — ASSESSMENT & PLAN NOTE
Lab Results   Component Value Date    HGBA1C 6.5 (H) 07/17/2024     Glycemic control has improved.  Patient is currently on metformin 500 once daily.    Patient unable to tolerate SGLT2 inhibitor due to GI side effects.  Has past history of diverticulitis.  I have advised him to start with a low calorie diet and improve physical activity.  Continue monitoring with metabolic labs at 4 months interval.

## 2024-09-10 ENCOUNTER — OFFICE VISIT (OUTPATIENT)
Dept: PAIN MEDICINE | Facility: CLINIC | Age: 49
End: 2024-09-10
Payer: COMMERCIAL

## 2024-09-10 VITALS
SYSTOLIC BLOOD PRESSURE: 143 MMHG | BODY MASS INDEX: 39.33 KG/M2 | RESPIRATION RATE: 16 BRPM | HEIGHT: 66 IN | WEIGHT: 244.71 LBS | DIASTOLIC BLOOD PRESSURE: 94 MMHG | HEART RATE: 83 BPM

## 2024-09-10 DIAGNOSIS — G89.4 CHRONIC PAIN SYNDROME: ICD-10-CM

## 2024-09-10 DIAGNOSIS — M54.10 RADICULOPATHY, UNSPECIFIED SPINAL REGION: ICD-10-CM

## 2024-09-10 DIAGNOSIS — M79.18 MYOFASCIAL PAIN SYNDROME: ICD-10-CM

## 2024-09-10 DIAGNOSIS — M96.1 CERVICAL POSTLAMINECTOMY SYNDROME: ICD-10-CM

## 2024-09-10 DIAGNOSIS — M54.12 CERVICAL RADICULOPATHY: Primary | ICD-10-CM

## 2024-09-10 PROCEDURE — 99214 OFFICE O/P EST MOD 30 MIN: CPT

## 2024-09-10 RX ORDER — METHYLPREDNISOLONE 4 MG
TABLET, DOSE PACK ORAL
Qty: 1 EACH | Refills: 0 | Status: SHIPPED | OUTPATIENT
Start: 2024-09-10

## 2024-09-10 RX ORDER — PREGABALIN 200 MG/1
200 CAPSULE ORAL 2 TIMES DAILY
Qty: 180 CAPSULE | Refills: 1 | Status: SHIPPED | OUTPATIENT
Start: 2024-09-10
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
## 2024-09-18 ENCOUNTER — VBI (OUTPATIENT)
Dept: ADMINISTRATIVE | Facility: OTHER | Age: 49
End: 2024-09-18

## 2024-09-18 NOTE — TELEPHONE ENCOUNTER
09/18/24 8:50 AM     Chart reviewed for Diabetic Eye Exam was/were submitted to the patient's insurance.     Elio Farah MA   [facility]   Hypertension controlled.  Continue lisinopril 5 mg and hydrochlorothiazide 12.5 mg a day.  She feels like the metoprolol is causing some more shortness of breath so we are going to stop metoprolol and start carvedilol 6.25 twice daily.  If this continues to cause cause shortness of breath will consider Bystolic.

## 2025-01-13 ENCOUNTER — APPOINTMENT (OUTPATIENT)
Dept: RADIOLOGY | Facility: CLINIC | Age: 50
End: 2025-01-13
Payer: COMMERCIAL

## 2025-01-13 ENCOUNTER — OFFICE VISIT (OUTPATIENT)
Age: 50
End: 2025-01-13
Payer: COMMERCIAL

## 2025-01-13 VITALS — WEIGHT: 244 LBS | BODY MASS INDEX: 39.21 KG/M2 | HEIGHT: 66 IN
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
## 2025-01-13 DIAGNOSIS — S99.921S PLANTAR PLATE INJURY, RIGHT, SEQUELA: ICD-10-CM

## 2025-01-13 DIAGNOSIS — M79.673 PAIN OF FOOT, UNSPECIFIED LATERALITY: Primary | ICD-10-CM

## 2025-01-13 DIAGNOSIS — S93.521A TURF TOE OF RIGHT FOOT: ICD-10-CM

## 2025-01-13 DIAGNOSIS — M79.673 PAIN OF FOOT, UNSPECIFIED LATERALITY: ICD-10-CM

## 2025-01-13 PROCEDURE — 99204 OFFICE O/P NEW MOD 45 MIN: CPT | Performed by: STUDENT IN AN ORGANIZED HEALTH CARE EDUCATION/TRAINING PROGRAM

## 2025-01-13 PROCEDURE — 73630 X-RAY EXAM OF FOOT: CPT
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
## 2025-01-28 ENCOUNTER — HOSPITAL ENCOUNTER (OUTPATIENT)
Dept: RADIOLOGY | Age: 50
Discharge: HOME | End: 2025-01-28
Payer: COMMERCIAL

## 2025-01-28 DIAGNOSIS — S99.921S PLANTAR PLATE INJURY, RIGHT, SEQUELA: ICD-10-CM

## 2025-01-28 PROCEDURE — 73718 MRI LOWER EXTREMITY W/O DYE: CPT

## 2025-01-30 ENCOUNTER — RESULTS FOLLOW-UP (OUTPATIENT)
Age: 50
End: 2025-01-30

## 2025-04-11 ENCOUNTER — TELEPHONE (OUTPATIENT)
Age: 50
End: 2025-04-11

## 2025-04-11 DIAGNOSIS — M54.10 RADICULOPATHY, UNSPECIFIED SPINAL REGION: ICD-10-CM

## 2025-04-11 RX ORDER — PREGABALIN 200 MG/1
200 CAPSULE ORAL 2 TIMES DAILY
Qty: 180 CAPSULE | Refills: 1 | Status: SHIPPED | OUTPATIENT
Start: 2025-04-11

## 2025-04-11 NOTE — TELEPHONE ENCOUNTER
Pt contacted Call Center requested refill of their medication.        Doctor Name: Dr. PRESSLEY      Medication Name: Pregabalin      Dosage of Med: 200mg      Frequency of Med: 2 a day      Remaining Medication: 4 Tabs left      Pharmacy and Location:     [facility]/pharmacy #[id number] [facility] [place] - [address] [place] [address].     Pt. Preferred Callback Phone Number:       Thank you.       PLEASE ADVISE PATIENTS:    REFILL REQUESTS WILL BE PROCESSED WITHIN 24-48 HOURS.

## 2025-04-21 ENCOUNTER — OFFICE VISIT (OUTPATIENT)
Dept: PAIN MEDICINE | Facility: CLINIC | Age: 50
End: 2025-04-21
Payer: COMMERCIAL

## 2025-04-21 VITALS — HEIGHT: 66 IN | BODY MASS INDEX: 35.2 KG/M2 | WEIGHT: 219 LBS

## 2025-04-21 DIAGNOSIS — M54.12 CERVICAL RADICULOPATHY: Primary | ICD-10-CM

## 2025-04-21 DIAGNOSIS — G89.4 CHRONIC PAIN SYNDROME: ICD-10-CM

## 2025-04-21 PROCEDURE — 99213 OFFICE O/P EST LOW 20 MIN: CPT

## 2025-04-21 NOTE — PROGRESS NOTES
Assessment:  1. Cervical radiculopathy    2. Chronic pain syndrome        Plan:  The patient is a 50-year-old male with a history of chronic pain secondary to neck pain, cervical disc disorder with radiculopathy and status post ACDF who presents to the office with ongoing neck pain that is unchanged since his last office visit.    Overall his pain continues to be managed with taking Lyrica, therefore I will continue him on this medication as prescribed.  Refills were recently sent to the patient's pharmacy, therefore he does not require refills at this time.  We will follow-up in 5 months for medication reevaluation and refills, or sooner if necessary.    My impressions and treatment recommendations were discussed in detail with the patient who verbalized understanding and had no further questions.  Discharge instructions were provided. I personally saw and examined the patient and I agree with the above discussed plan of care.    No orders of the defined types were placed in this encounter.    No orders of the defined types were placed in this encounter.      History of Present Illness:  Gonzalo Mai is a 50 y.o. male  with a history of chronic pain secondary to neck pain, cervical disc disorder with radiculopathy who is status post C3-4 ACDF.  He was last seen on 9/10/2024 where he was continued on Lyrica.  He presents to the office with ongoing neck pain.  Since his last office visit, he has been working out regularly and recently lost 40 pounds.    He states his pain is the same since the last office visit and constant.  He is currently rating his pain a 5/10 on a numeric scale.    Current medications include Lyrica 200 mg twice a day and Robaxin 750 mg as needed for muscle spasms. He states his medication regimen is mildly helpful.     I have personally reviewed and/or updated the patient's past medical history, past surgical history, family history, social history, current medications, allergies, and vital  signs today.     Review of Systems   Musculoskeletal:  Positive for neck pain.   Neurological:  Positive for numbness.       Patient Active Problem List   Diagnosis    Essential hypertension    Preventative health care    Lateral epicondylitis of right elbow    Psoriasis    Hyperlipidemia    Moderate persistent asthma without complication    Diverticulitis large intestine w/o perforation or abscess w/o bleeding    Need for prophylactic vaccination and inoculation against influenza    Status post colon resection    Controlled type 2 diabetes mellitus without complication, without long-term current use of insulin (HCC)    Left foot pain    Sleep apnea    Cervicalgia    Radiculopathy    Chronic pain syndrome    Cervical disc disorder with radiculopathy of mid-cervical region    Pre-operative clearance    Cervical cord compression with myelopathy (HCC)    Erectile dysfunction    Other fatigue    Snoring    Pain of right great toe       Past Medical History:   Diagnosis Date    Diverticulitis     Hyperlipemia     Hypertension     Prediabetes     Psoriasis        Past Surgical History:   Procedure Laterality Date    ABDOMINAL SURGERY      COLONOSCOPY      WY ARTHRD ANT INTERBODY DECOMPRESS CERVICAL BELW C2 Bilateral 1/26/2022    Procedure: C3/4 Anterior Cervical Discectomy and Fusion (neuromonitoring);  Surgeon: Agapito Mathur MD;  Location: [facility];  Service: Neurosurgery    WY COLONOSCOPY FLX DX W/COLLJ SPEC WHEN PFRMD N/A 4/3/2019    Procedure: COLONOSCOPY;  Surgeon: Ramin Donovan MD;  Location: [facility];  Service: Colorectal    WY LAPAROSCOPY COLECTOMY PARTIAL W/ANASTOMOSIS N/A 1/8/2020    Procedure: RESECTION COLON SIGMOID LAPAROSCOPIC, LAPAROSCOPIC SPLENIC FLEXURE MOBILIZATION;  Surgeon: Ramin Donovan MD;  Location: [facility];  Service: Colorectal    WY SIGMOIDOSCOPY FLX DX W/COLLJ SPEC BR/WA IF PFRMD N/A 1/8/2020    Procedure: SIGMOIDOSCOPY FLEXIBLE;  Surgeon: Ramin Donovan MD;   Location: [facility];  Service: Colorectal    TENDON REPAIR      right elbow       Family History   Problem Relation Age of Onset    Diabetes Maternal Grandmother     Diabetes Paternal Grandfather     Cancer Father     Colon cancer Neg Hx        Social History     Occupational History    Not on file   Tobacco Use    Smoking status: Former     Current packs/day: 0.00     Average packs/day: 2.0 packs/day for 30.0 years (60.0 ttl pk-yrs)     Types: Cigarettes     Quit date: 2019     Years since quittin.9    Smokeless tobacco: Current     Types: Chew    Tobacco comments:     Quit 20 months ago   Vaping Use    Vaping status: Never Used   Substance and Sexual Activity    Alcohol use: Yes     Comment: occasional alcohol use    Drug use: Yes     Types: Marijuana     Comment: every couple weeks    Sexual activity: Not Currently       Current Outpatient Medications on File Prior to Visit   Medication Sig    Cholecalciferol (VITAMIN D) 2000 units CAPS Take 1 capsule by mouth daily    losartan (COZAAR) 50 mg tablet Take 1 tablet (50 mg total) by mouth daily    metFORMIN (GLUCOPHAGE) 500 mg tablet TAKE 1 TABLET BY MOUTH EVERY DAY WITH BREAKFAST    pregabalin (LYRICA) 200 MG capsule Take 1 capsule (200 mg total) by mouth 2 (two) times a day Take 1 tablet in the morning and 1 tablet in the evening    rosuvastatin (CRESTOR) 5 mg tablet Take 1 tablet (5 mg total) by mouth daily    methocarbamol (ROBAXIN) 750 mg tablet Take 1 tablet (750 mg total) by mouth every 6 (six) hours as needed for muscle spasms (neck pain) (Patient not taking: Reported on 2025)    Multiple Vitamins-Minerals (multivitamin with minerals) tablet Take 1 tablet by mouth daily (Patient not taking: Reported on 2025)    tadalafil (CIALIS) 20 MG tablet Take 1 tablet (20 mg total) by mouth daily as needed for erectile dysfunction    Ustekinumab (STELARA SC) Inject under the skin every 3 (three) months (Patient not taking: Reported on 2023)     "[DISCONTINUED] ALPRAZolam (XANAX) 0.25 mg tablet Take 1 tablet (0.25 mg total) by mouth daily at bedtime as needed for anxiety (Patient not taking: Reported on 4/21/2025)    [DISCONTINUED] methylPREDNISolone 4 MG tablet therapy pack Use as directed on package (Patient not taking: Reported on 4/21/2025)     No current facility-administered medications on file prior to visit.       No Known Allergies    Physical Exam:    Ht 5' 6\" (1.676 m)   Wt 99.3 kg (219 lb)   BMI 35.35 kg/m²     Constitutional:normal, well developed, well nourished, alert, in no distress and non-toxic and no overt pain behavior.  Eyes:anicteric  HEENT:grossly intact  Neck:supple, symmetric, trachea midline and no masses   Pulmonary:even and unlabored  Cardiovascular:No edema or pitting edema present  Skin:Normal without rashes or lesions and well hydrated  Psychiatric:Mood and affect appropriate  Neurologic:Cranial Nerves II-XII grossly intact  Musculoskeletal:normal    Imaging    "

## 2025-05-12 ENCOUNTER — TELEPHONE (OUTPATIENT)
Age: 50
End: 2025-05-12

## 2025-05-12 ENCOUNTER — OFFICE VISIT (OUTPATIENT)
Dept: FAMILY MEDICINE CLINIC | Facility: CLINIC | Age: 50
End: 2025-05-12

## 2025-05-12 VITALS
HEART RATE: 73 BPM | BODY MASS INDEX: 35.2 KG/M2 | DIASTOLIC BLOOD PRESSURE: 78 MMHG | WEIGHT: 219 LBS | RESPIRATION RATE: 16 BRPM | OXYGEN SATURATION: 97 % | HEIGHT: 66 IN | SYSTOLIC BLOOD PRESSURE: 148 MMHG

## 2025-05-12 DIAGNOSIS — N52.9 ERECTILE DYSFUNCTION, UNSPECIFIED ERECTILE DYSFUNCTION TYPE: ICD-10-CM

## 2025-05-12 DIAGNOSIS — Z00.00 ANNUAL PHYSICAL EXAM: Primary | ICD-10-CM

## 2025-05-12 DIAGNOSIS — G95.20 CERVICAL CORD COMPRESSION WITH MYELOPATHY (HCC): ICD-10-CM

## 2025-05-12 DIAGNOSIS — E11.9 CONTROLLED TYPE 2 DIABETES MELLITUS WITHOUT COMPLICATION, WITHOUT LONG-TERM CURRENT USE OF INSULIN (HCC): ICD-10-CM

## 2025-05-12 DIAGNOSIS — E78.5 HYPERLIPIDEMIA, UNSPECIFIED HYPERLIPIDEMIA TYPE: ICD-10-CM

## 2025-05-12 DIAGNOSIS — I10 ESSENTIAL HYPERTENSION: ICD-10-CM

## 2025-05-12 DIAGNOSIS — E55.9 VITAMIN D DEFICIENCY: ICD-10-CM

## 2025-05-12 PROBLEM — M77.11 LATERAL EPICONDYLITIS OF RIGHT ELBOW: Status: RESOLVED | Noted: 2018-12-05 | Resolved: 2025-05-12

## 2025-05-12 PROBLEM — R06.83 SNORING: Status: RESOLVED | Noted: 2023-11-20 | Resolved: 2025-05-12

## 2025-05-12 PROBLEM — G89.4 CHRONIC PAIN SYNDROME: Status: RESOLVED | Noted: 2022-01-04 | Resolved: 2025-05-12

## 2025-05-12 PROBLEM — M79.672 LEFT FOOT PAIN: Status: RESOLVED | Noted: 2020-12-16 | Resolved: 2025-05-12

## 2025-05-12 PROBLEM — R53.83 OTHER FATIGUE: Status: RESOLVED | Noted: 2023-06-29 | Resolved: 2025-05-12

## 2025-05-12 PROBLEM — Z23 NEED FOR PROPHYLACTIC VACCINATION AND INOCULATION AGAINST INFLUENZA: Status: RESOLVED | Noted: 2019-10-10 | Resolved: 2025-05-12

## 2025-05-12 PROBLEM — Z01.818 PRE-OPERATIVE CLEARANCE: Status: RESOLVED | Noted: 2022-01-20 | Resolved: 2025-05-12

## 2025-05-12 PROBLEM — M79.674 PAIN OF RIGHT GREAT TOE: Status: RESOLVED | Noted: 2024-05-06 | Resolved: 2025-05-12

## 2025-05-12 PROBLEM — Z90.49 STATUS POST COLON RESECTION: Status: RESOLVED | Noted: 2020-01-22 | Resolved: 2025-05-12

## 2025-05-12 PROBLEM — M54.2 CERVICALGIA: Status: RESOLVED | Noted: 2021-10-14 | Resolved: 2025-05-12

## 2025-05-12 PROBLEM — M50.120 CERVICAL DISC DISORDER WITH RADICULOPATHY OF MID-CERVICAL REGION: Status: RESOLVED | Noted: 2022-01-04 | Resolved: 2025-05-12

## 2025-05-12 PROBLEM — M54.10 RADICULOPATHY: Status: RESOLVED | Noted: 2021-10-14 | Resolved: 2025-05-12

## 2025-05-12 LAB — SL AMB POCT HEMOGLOBIN AIC: 5.6 (ref ?–6.5)

## 2025-05-12 RX ORDER — TADALAFIL 20 MG/1
20 TABLET ORAL DAILY PRN
Qty: 30 TABLET | Refills: 4 | Status: SHIPPED | OUTPATIENT
Start: 2025-05-12 | End: 2025-05-13 | Stop reason: SDUPTHER

## 2025-05-12 NOTE — ASSESSMENT & PLAN NOTE
Currently prescribed 5 mg crestor, labs outstanding. Will complete lipid panel tomorrow and provide refill once results received.

## 2025-05-12 NOTE — PATIENT INSTRUCTIONS
"Patient Education     Routine physical for adults   The Basics   Written by the doctors and editors at [facility]   What is a physical? -- A physical is a routine visit, or \"check-up,\" with your doctor. You might also hear it called a \"wellness visit\" or \"preventive visit.\"  During each visit, the doctor will:   Ask about your physical and mental health   Ask about your habits, behaviors, and lifestyle   Do an exam   Give you vaccines if needed   Talk to you about any medicines you take   Give advice about your health   Answer your questions  Getting regular check-ups is an important part of taking care of your health. It can help your doctor find and treat any problems you have. But it's also important for preventing health problems.  A routine physical is different from a \"sick visit.\" A sick visit is when you see a doctor because of a health concern or problem. Since physicals are scheduled ahead of time, you can think about what you want to ask the doctor.  How often should I get a physical? -- It depends on your age and health. In general, for people age 21 years and older:   If you are younger than 50 years, you might be able to get a physical every 3 years.   If you are 50 years or older, your doctor might recommend a physical every year.  If you have an ongoing health condition, like diabetes or high blood pressure, your doctor will probably want to see you more often.  What happens during a physical? -- In general, each visit will include:   Physical exam - The doctor or nurse will check your height, weight, heart rate, and blood pressure. They will also look at your eyes and ears. They will ask about how you are feeling and whether you have any symptoms that bother you.   Medicines - It's a good idea to bring a list of all the medicines you take to each doctor visit. Your doctor will talk to you about your medicines and answer any questions. Tell them if you are having any side effects that bother you. You " "should also tell them if you are having trouble paying for any of your medicines.   Habits and behaviors - This includes:   Your diet   Your exercise habits   Whether you smoke, drink alcohol, or use drugs   Whether you are sexually active   Whether you feel safe at home  Your doctor will talk to you about things you can do to improve your health and lower your risk of health problems. They will also offer help and support. For example, if you want to quit smoking, they can give you advice and might prescribe medicines. If you want to improve your diet or get more physical activity, they can help you with this, too.   Lab tests, if needed - The tests you get will depend on your age and situation. For example, your doctor might want to check your:   Cholesterol   Blood sugar   Iron level   Vaccines - The recommended vaccines will depend on your age, health, and what vaccines you already had. Vaccines are very important because they can prevent certain serious or deadly infections.   Discussion of screening - \"Screening\" means checking for diseases or other health problems before they cause symptoms. Your doctor can recommend screening based on your age, risk, and preferences. This might include tests to check for:   Cancer, such as breast, prostate, cervical, ovarian, colorectal, prostate, lung, or skin cancer   Sexually transmitted infections, such as chlamydia and gonorrhea   Mental health conditions like depression and anxiety  Your doctor will talk to you about the different types of screening tests. They can help you decide which screenings to have. They can also explain what the results might mean.   Answering questions - The physical is a good time to ask the doctor or nurse questions about your health. If needed, they can refer you to other doctors or specialists, too.  Adults older than 65 years often need other care, too. As you get older, your doctor will talk to you about:   How to prevent falling at " home   Hearing or vision tests   Memory testing   How to take your medicines safely   Making sure that you have the help and support you need at home  All topics are updated as new evidence becomes available and our peer review process is complete.  This topic retrieved from [employer] on: May 02, 2024.  Topic 159453 Version 1.0  Release: 32.4.3 - C32.122  © 2024 UpToDate, Inc. and/or its affiliates. All rights reserved.  Consumer Information Use and Disclaimer   Disclaimer: This generalized information is a limited summary of diagnosis, treatment, and/or medication information. It is not meant to be comprehensive and should be used as a tool to help the user understand and/or assess potential diagnostic and treatment options. It does NOT include all information about conditions, treatments, medications, side effects, or risks that may apply to a specific patient. It is not intended to be medical advice or a substitute for the medical advice, diagnosis, or treatment of a health care provider based on the health care provider's examination and assessment of a patient's specific and unique circumstances. Patients must speak with a health care provider for complete information about their health, medical questions, and treatment options, including any risks or benefits regarding use of medications. This information does not endorse any treatments or medications as safe, effective, or approved for treating a specific patient. UpToDate, Inc. and its affiliates disclaim any warranty or liability relating to this information or the use thereof.The use of this information is governed by the Terms of Use, available at https://www.woltersSuperhumanuwer.com/en/know/clinical-effectiveness-terms. 2024© UpToDate, Inc. and its affiliates and/or licensors. All rights reserved.  Copyright   © 2024 UpToDate, Inc. and/or its affiliates. All rights reserved.

## 2025-05-12 NOTE — PROGRESS NOTES
Adult Annual Physical  Name: Gonzalo Mai      : 1975      MRN: [id number]  Encounter Provider: FUNMI Fitzgerald  Encounter Date: 2025   Encounter department: [facility]    :  Assessment & Plan  Annual physical exam  Routine screenings and immunizations UTD. Colonscopy due , prostate screening UTD. Will complete blood work tomorrow as he is not fasting today. Will f/u in 6 months or sooner pending lab abnormalities.       Controlled type 2 diabetes mellitus without complication, without long-term current use of insulin (HCC)    Lab Results   Component Value Date    HGBA1C 5.6 2025     Orders:    POCT hemoglobin A1c    Albumin / creatinine urine ratio; Future    Hemoglobin A1C; Future    A1C 5.6 today - working on dietary changes and exercise, has lost 30 pounds since last visit. Will stop Metformin for now and re-check A1C in 3 months to re-evaluate the need to restart the medication.   Cervical cord compression with myelopathy (HCC)  Stable on lyrica. Following with neurology, last visit was last month.        Hyperlipidemia, unspecified hyperlipidemia type  Currently prescribed 5 mg crestor, labs outstanding. Will complete lipid panel tomorrow and provide refill once results received.       Essential hypertension  BP slightly elevated today - reports BP is elevated when in the office. Currently on losartan 50 mg - will refill once receive CMP results.        Vitamin D deficiency  Taking 2000 IU Vitamin D daily, will re-check levels.   Orders:    Vitamin D 25 hydroxy; Future    Erectile dysfunction, unspecified erectile dysfunction type  Managed with prn Cialis, refill provided.  Orders:    tadalafil (CIALIS) 20 MG tablet; Take 1 tablet (20 mg total) by mouth daily as needed for erectile dysfunction        Preventive Screenings:  - Diabetes Screening: has diabetes and orders placed  - Cholesterol Screening: orders placed and has hyperlipidemia   - Hepatitis C  screening: patient declines   - HIV screening: patient declines   - Colon cancer screening: screening up-to-date   - Lung cancer screening: screening not indicated   - Prostate cancer screening: screening up-to-date     Counseling/Anticipatory Guidance:  - Alcohol: discussed moderation in alcohol intake and recommendations for healthy alcohol use.   - Drug use: discussed harms of illicit drug use and how it can negatively impact mental/physical health.   - Tobacco use: discussed harms of tobacco use and management options for quitting.   - Dental health: discussed importance of regular tooth brushing, flossing, and dental visits.   - Sexual health: discussed sexually transmitted diseases, partner selection, use of condoms, avoidance of unintended pregnancy, and contraceptive alternatives.   - Diet: discussed recommendations for a healthy/well-balanced diet.   - Exercise: the importance of regular exercise/physical activity was discussed. Recommend exercise 3-5 times per week for at least 30 minutes.   - Injury prevention: discussed safety/seat belts, safety helmets, smoke detectors, carbon monoxide detectors, and smoking near bedding or upholstery.       Depression Screening and Follow-up Plan: Patient was screened for depression during today's encounter. They screened negative with a PHQ-2 score of 0.      Tobacco Cessation Counseling: Tobacco cessation counseling was provided. The patient is sincerely urged to quit consumption of tobacco. He is not ready to quit tobacco.         History of Present Illness     Adult Annual Physical:  Patient presents for annual physical. 50 year old male presents for annual wellness exam and mangement of chronic conditions. PMH significant for HTN, diabetes, HLD, and cervical cord compression with myelopathy. Follows with neurology s/p ACDF - managed with nightly lyrica. He is actively working on losing weight through diet and exercise, has noticed a 30 pound weight loss over the  "past 6 months. Current staying busy working in construction. Denies major concerns at this time. .     Diet and Physical Activity:  - Diet/Nutrition: no special diet.  - Exercise: no formal exercise.    Depression Screening:  - PHQ-2 Score: 0    General Health:  - Sleep: sleeps well.  - Hearing: normal hearing right ear.  - Vision: no vision problems and most recent eye exam > 1 year ago.  - Dental: no dental visits for > 1 year.     Health:  - History of STDs: no.   - Urinary symptoms: none.     Advanced Care Planning:  - Has an advanced directive?: no    - Has a durable medical POA?: no    - ACP document given to patient?: no      Review of Systems   Constitutional:  Negative for activity change, appetite change, chills, fatigue and fever.   HENT:  Negative for congestion, ear pain and sore throat.    Eyes:  Negative for pain and visual disturbance.   Respiratory:  Negative for cough, chest tightness and shortness of breath.    Cardiovascular:  Negative for chest pain and palpitations.   Gastrointestinal:  Negative for abdominal pain, constipation, diarrhea, nausea and vomiting.   Genitourinary:  Negative for dysuria and hematuria.   Musculoskeletal:  Negative for arthralgias and back pain.   Skin:  Negative for color change and rash.   Allergic/Immunologic: Negative for environmental allergies and food allergies.   Neurological:  Negative for dizziness, seizures, syncope, light-headedness and headaches.   All other systems reviewed and are negative.        Objective   /78   Pulse 73   Resp 16   Ht 5' 6\" (1.676 m)   Wt 99.3 kg (219 lb)   SpO2 97%   BMI 35.35 kg/m²     Physical Exam  Vitals and nursing note reviewed.   Constitutional:       General: He is not in acute distress.     Appearance: Normal appearance. He is well-developed and overweight.   HENT:      Head: Normocephalic and atraumatic.      Right Ear: Tympanic membrane, ear canal and external ear normal.      Left Ear: Tympanic membrane, " ear canal and external ear normal.      Nose: No congestion or rhinorrhea.      Mouth/Throat:      Mouth: Mucous membranes are moist.   Eyes:      Conjunctiva/sclera: Conjunctivae normal.   Cardiovascular:      Rate and Rhythm: Normal rate and regular rhythm.      Heart sounds: No murmur heard.  Pulmonary:      Effort: Pulmonary effort is normal. No respiratory distress.      Breath sounds: Normal breath sounds.   Abdominal:      Palpations: Abdomen is soft.      Tenderness: There is no abdominal tenderness.   Musculoskeletal:         General: No swelling.      Cervical back: Neck supple.   Skin:     General: Skin is warm and dry.      Capillary Refill: Capillary refill takes less than 2 seconds.   Neurological:      General: No focal deficit present.      Mental Status: He is alert and oriented to person, place, and time.   Psychiatric:         Mood and Affect: Mood normal.         Behavior: Behavior normal.         Thought Content: Thought content normal.         Judgment: Judgment normal.

## 2025-05-12 NOTE — TELEPHONE ENCOUNTER
PA for CIALIS 20MG SUBMITTED to [employer]    via      [x][employer]-Case ID #     [x]PA sent as URGENT    All office notes, labs and other pertaining documents and studies sent. Clinical questions answered. Awaiting determination from insurance company.     Turnaround time for your insurance to make a decision on your Prior Authorization can take 7-21 business days.

## 2025-05-12 NOTE — ASSESSMENT & PLAN NOTE
Lab Results   Component Value Date    HGBA1C 5.6 05/12/2025     Orders:    POCT hemoglobin A1c    Albumin / creatinine urine ratio; Future    Hemoglobin A1C; Future    A1C 5.6 today - working on dietary changes and exercise, has lost 30 pounds since last visit. Will stop Metformin for now and re-check A1C in 3 months to re-evaluate the need to restart the medication.

## 2025-05-12 NOTE — ASSESSMENT & PLAN NOTE
BP slightly elevated today - reports BP is elevated when in the office. Currently on losartan 50 mg - will refill once receive CMP results.

## 2025-05-12 NOTE — ASSESSMENT & PLAN NOTE
Managed with prn Cialis, refill provided.  Orders:    tadalafil (CIALIS) 20 MG tablet; Take 1 tablet (20 mg total) by mouth daily as needed for erectile dysfunction

## 2025-05-13 RX ORDER — TADALAFIL 20 MG/1
20 TABLET ORAL DAILY PRN
Qty: 30 TABLET | Refills: 4 | Status: SHIPPED | OUTPATIENT
Start: 2025-05-13

## 2025-05-23 ENCOUNTER — APPOINTMENT (OUTPATIENT)
Dept: LAB | Facility: CLINIC | Age: 50
End: 2025-05-23
Payer: COMMERCIAL

## 2025-05-23 ENCOUNTER — RESULTS FOLLOW-UP (OUTPATIENT)
Dept: FAMILY MEDICINE CLINIC | Facility: CLINIC | Age: 50
End: 2025-05-23

## 2025-05-23 DIAGNOSIS — E78.5 HYPERLIPIDEMIA, UNSPECIFIED HYPERLIPIDEMIA TYPE: ICD-10-CM

## 2025-05-23 DIAGNOSIS — E11.9 CONTROLLED TYPE 2 DIABETES MELLITUS WITHOUT COMPLICATION, WITHOUT LONG-TERM CURRENT USE OF INSULIN (HCC): ICD-10-CM

## 2025-05-23 DIAGNOSIS — E55.9 VITAMIN D DEFICIENCY: ICD-10-CM

## 2025-05-23 PROBLEM — J45.40 MODERATE PERSISTENT ASTHMA WITHOUT COMPLICATION: Status: RESOLVED | Noted: 2019-04-10 | Resolved: 2025-05-23

## 2025-05-23 LAB
25(OH)D3 SERPL-MCNC: 33.4 NG/ML (ref 30–100)
ALBUMIN SERPL BCG-MCNC: 4.2 G/DL (ref 3.5–5)
ALP SERPL-CCNC: 79 U/L (ref 34–104)
ALT SERPL W P-5'-P-CCNC: 22 U/L (ref 7–52)
ANION GAP SERPL CALCULATED.3IONS-SCNC: 8 MMOL/L (ref 4–13)
AST SERPL W P-5'-P-CCNC: 17 U/L (ref 13–39)
BILIRUB SERPL-MCNC: 0.28 MG/DL (ref 0.2–1)
BUN SERPL-MCNC: 18 MG/DL (ref 5–25)
CALCIUM SERPL-MCNC: 9.1 MG/DL (ref 8.4–10.2)
CHLORIDE SERPL-SCNC: 108 MMOL/L (ref 96–108)
CHOLEST SERPL-MCNC: 118 MG/DL (ref ?–200)
CO2 SERPL-SCNC: 27 MMOL/L (ref 21–32)
CREAT SERPL-MCNC: 0.86 MG/DL (ref 0.6–1.3)
CREAT UR-MCNC: 202.1 MG/DL
EST. AVERAGE GLUCOSE BLD GHB EST-MCNC: 126 MG/DL
GFR SERPL CREATININE-BSD FRML MDRD: 102 ML/MIN/1.73SQ M
GLUCOSE P FAST SERPL-MCNC: 87 MG/DL (ref 65–99)
HBA1C MFR BLD: 6 %
HDLC SERPL-MCNC: 33 MG/DL
LDLC SERPL CALC-MCNC: 68 MG/DL (ref 0–100)
MICROALBUMIN UR-MCNC: 9 MG/L
MICROALBUMIN/CREAT 24H UR: 4 MG/G CREATININE (ref 0–30)
POTASSIUM SERPL-SCNC: 4.2 MMOL/L (ref 3.5–5.3)
PROT SERPL-MCNC: 7 G/DL (ref 6.4–8.4)
SODIUM SERPL-SCNC: 143 MMOL/L (ref 135–147)
TRIGL SERPL-MCNC: 84 MG/DL (ref ?–150)

## 2025-05-23 PROCEDURE — 36415 COLL VENOUS BLD VENIPUNCTURE: CPT

## 2025-05-23 PROCEDURE — 80061 LIPID PANEL: CPT

## 2025-05-23 PROCEDURE — 82043 UR ALBUMIN QUANTITATIVE: CPT

## 2025-05-23 PROCEDURE — 80053 COMPREHEN METABOLIC PANEL: CPT

## 2025-05-23 PROCEDURE — 82306 VITAMIN D 25 HYDROXY: CPT

## 2025-05-23 PROCEDURE — 82570 ASSAY OF URINE CREATININE: CPT

## 2025-05-23 PROCEDURE — 83036 HEMOGLOBIN GLYCOSYLATED A1C: CPT

## 2025-05-23 RX ORDER — LOSARTAN POTASSIUM 50 MG/1
50 TABLET ORAL DAILY
Qty: 90 TABLET | Refills: 1 | Status: SHIPPED | OUTPATIENT
Start: 2025-05-23

## 2025-05-23 RX ORDER — ROSUVASTATIN CALCIUM 5 MG/1
5 TABLET, COATED ORAL DAILY
Qty: 90 TABLET | Refills: 1 | Status: SHIPPED | OUTPATIENT
Start: 2025-05-23

## (undated) DEVICE — SUT VICRYL 2-0 SH 27 IN UNDYED J417H

## (undated) DEVICE — BETHLEHEM UNIVERSAL SPINE, KIT: Brand: CARDINAL HEALTH

## (undated) DEVICE — GLOVE SRG BIOGEL 7.5

## (undated) DEVICE — POOLE SUCTION HANDLE: Brand: CARDINAL HEALTH

## (undated) DEVICE — CO2 AND WATER TUBING/CAP SET FOR OLYMPUS® SCOPES & UCR: Brand: ERBE

## (undated) DEVICE — NEEDLE 22 G X 1 1/2 SAFETY

## (undated) DEVICE — PREP SURGICAL PURPREP 26ML

## (undated) DEVICE — VEST COOLING PLUS SZ SNGL USE

## (undated) DEVICE — INSUFLATION TUBING INSUFLOW (LEXION)

## (undated) DEVICE — SUT MONOCRYL 4-0 PS-2 18 IN Y496G

## (undated) DEVICE — DRAPE MICROSCOPE ARMATEC 46 X 120IN ANGL SLIM LENS F/LEICA

## (undated) DEVICE — IRRIG ENDO FLO TUBING

## (undated) DEVICE — DRAPE SHEET X-LG

## (undated) DEVICE — SNAP KOVER: Brand: UNBRANDED

## (undated) DEVICE — PAD GROUNDING ADULT

## (undated) DEVICE — CHLORAPREP HI-LITE 26ML ORANGE

## (undated) DEVICE — INTENDED FOR TISSUE SEPARATION, AND OTHER PROCEDURES THAT REQUIRE A SHARP SURGICAL BLADE TO PUNCTURE OR CUT.: Brand: BARD-PARKER ® CARBON RIB-BACK BLADES

## (undated) DEVICE — ELECTRODE BLADE MOD  E-Z CLEAN 6.5IN -0014M

## (undated) DEVICE — TROCAR: Brand: KII FIOS FIRST ENTRY

## (undated) DEVICE — GOWN ,SIRUS ,NONREINFORCED 4XL: Brand: MEDLINE

## (undated) DEVICE — UNDER BUTTOCKS DRAPE W/FLUID CONTROL POUCH: Brand: CONVERTORS

## (undated) DEVICE — HEMOSTATIC MATRIX SURGIFLO 8ML W/THROMBIN

## (undated) DEVICE — STERILE EMESIS BASIN                 070: Brand: CARDINAL HEALTH

## (undated) DEVICE — CONTOUR CURVED CUTTER STAPLER: Brand: CONTOUR

## (undated) DEVICE — INTENDED FOR TISSUE SEPARATION, AND OTHER PROCEDURES THAT REQUIRE A SHARP SURGICAL BLADE TO PUNCTURE OR CUT.: Brand: BARD-PARKER SAFETY BLADES SIZE 15, STERILE

## (undated) DEVICE — TRAVELKIT CONTAINS FIRST STEP KIT (200ML EP-4 KIT) AND SOILED SCOPE BAG - 1 KIT: Brand: TRAVELKIT CONTAINS FIRST STEP KIT AND SOILED SCOPE BAG

## (undated) DEVICE — SPONGE PVP SCRUB WING STERILE

## (undated) DEVICE — SUT VICRYL 3-0 SH 27 IN J416H

## (undated) DEVICE — GLOVE SRG BIOGEL 6

## (undated) DEVICE — ROSEBUD DISSECTORS: Brand: DEROYAL

## (undated) DEVICE — ENDOSCOPIC CURVED INTRALUMINAL STAPLER (ILS) 24 TITANIUM ADJUSTABLE HEIGHT STAPLES

## (undated) DEVICE — STRL COTTON TIP APPLCTR 6IN PK: Brand: CARDINAL HEALTH

## (undated) DEVICE — DRAPE ADOLESCENT LAPAROTOMY

## (undated) DEVICE — SUT PDS II 1 CTX 36 IN Z371T

## (undated) DEVICE — GLOVE INDICATOR PI UNDERGLOVE SZ 6.5 BLUE

## (undated) DEVICE — SUT MONOCRYL PLUS 4-0 PS-2 18 IN MCP496G

## (undated) DEVICE — DISPOSABLE EQUIPMENT COVER: Brand: SMALL TOWEL DRAPE

## (undated) DEVICE — MAYO STAND COVER: Brand: CONVERTORS

## (undated) DEVICE — TRAY FOLEY 16FR URIMETER SILICONE SURESTEP

## (undated) DEVICE — GLOVE INDICATOR PI UNDERGLOVE SZ 7.5 BLUE

## (undated) DEVICE — VISUALIZATION SYSTEM: Brand: CLEARIFY

## (undated) DEVICE — TUBING SUCTION 5MM X 12 FT

## (undated) DEVICE — ENSEAL LAPAROSCOPIC TISSUE SEALER G2 STRAIGHT JAW FOR USE WITH G2 GENERATOR 5MM DIAMETER 35CM SHAFT LENGTH: Brand: ENSEAL

## (undated) DEVICE — CONTOUR CURVED CUTTER STAPLER RELOAD: Brand: CONTOUR

## (undated) DEVICE — 3000CC GUARDIAN II: Brand: GUARDIAN

## (undated) DEVICE — PACK PBDS STERILE LAP LITHOTOMY RF

## (undated) DEVICE — TROCAR: Brand: KII® SLEEVE

## (undated) DEVICE — ACCESS PLATFORM FOR MINIMALLY INVASIVE SURGERY.: Brand: GELPORT® LAPAROSCOPIC  SYSTEM

## (undated) DEVICE — NEEDLE SPINAL18G X 3.5 IN QUINCKE

## (undated) DEVICE — DRAPE C-ARMOUR

## (undated) DEVICE — Device: Brand: DEFENDO AIR/WATER/SUCTION AND BIOPSY VALVE

## (undated) DEVICE — ELECTRODE BLADE MOD E-Z CLEAN 2.5IN 6.4CM -0012M

## (undated) DEVICE — SUT VICRYL 0 CT-1 27 IN J260H

## (undated) DEVICE — ADHESIVE SKIN HIGH VISCOSITY EXOFIN 1ML

## (undated) DEVICE — SUT PROLENE 2-0 KS 30 IN 8623H

## (undated) DEVICE — SURGIFOAM 8.5 X 12.5

## (undated) DEVICE — 3M™ IOBAN™ 2 ANTIMICROBIAL INCISE DRAPE 6650EZ: Brand: IOBAN™ 2

## (undated) DEVICE — INTENDED FOR TISSUE SEPARATION, AND OTHER PROCEDURES THAT REQUIRE A SHARP SURGICAL BLADE TO PUNCTURE OR CUT.: Brand: BARD-PARKER SAFETY BLADES SIZE 11, STERILE

## (undated) DEVICE — PLUMEPEN PRO 10FT

## (undated) DEVICE — LIGHT HANDLE COVER SLEEVE DISP BLUE STELLAR

## (undated) DEVICE — ELECTRODE BLADE MOD E-Z CLEAN 4IN -0014AM

## (undated) DEVICE — SUT VICRYL 0 REEL 54 IN J287G

## (undated) DEVICE — DRESSING MEPILEX AG BORDER 4 X 8 IN

## (undated) DEVICE — ANTIBACTERIAL VIOLET BRAIDED (POLYGLACTIN 910), SYNTHETIC ABSORBABLE SUTURE: Brand: COATED VICRYL

## (undated) DEVICE — TOOL T12MH25 LEGEND 12CM 2.5MM MH: Brand: MIDAS REX ™

## (undated) DEVICE — 40595 XL TRENDELENBURG POSITIONING KIT: Brand: 40595 XL TRENDELENBURG POSITIONING KIT

## (undated) DEVICE — DRILL BIT 7080510 11 MM DRILL BIT S

## (undated) DEVICE — INSTRUMENT 874-445 ML 9X11MMDP CTTR 5MM: Brand: MEDTRONIC REUSABLE INSTRUMENT

## (undated) DEVICE — 3M™ STERI-STRIP™ REINFORCED ADHESIVE SKIN CLOSURES, R1540, 1/8 IN X 3 IN (3 MM X 75 MM), 5 STRIPS/ENVELOPE: Brand: 3M™ STERI-STRIP™